# Patient Record
Sex: FEMALE | Race: WHITE | NOT HISPANIC OR LATINO | Employment: UNEMPLOYED | URBAN - METROPOLITAN AREA
[De-identification: names, ages, dates, MRNs, and addresses within clinical notes are randomized per-mention and may not be internally consistent; named-entity substitution may affect disease eponyms.]

---

## 2017-03-06 ENCOUNTER — TELEMEDICINE (OUTPATIENT)
Dept: TELEMEDICINE | Facility: OTHER | Age: 82
End: 2017-03-06
Payer: MEDICARE

## 2017-03-06 DIAGNOSIS — E78.5 DYSLIPIDEMIA: ICD-10-CM

## 2017-03-06 DIAGNOSIS — I10 ESSENTIAL HYPERTENSION: ICD-10-CM

## 2017-03-06 DIAGNOSIS — R47.01 APHASIA: ICD-10-CM

## 2017-03-06 DIAGNOSIS — I48.0 PAROXYSMAL ATRIAL FIBRILLATION: ICD-10-CM

## 2017-03-06 DIAGNOSIS — G81.11 RIGHT SPASTIC HEMIPLEGIA: ICD-10-CM

## 2017-03-06 DIAGNOSIS — I63.412 EMBOLIC STROKE INVOLVING LEFT MIDDLE CEREBRAL ARTERY: ICD-10-CM

## 2017-03-06 PROBLEM — I63.9 STROKE: Status: ACTIVE | Noted: 2017-03-06

## 2017-03-06 PROCEDURE — G0508 CRIT CARE TELEHEA CONSULT 60: HCPCS | Mod: GT,,, | Performed by: PSYCHIATRY & NEUROLOGY

## 2017-03-07 ENCOUNTER — HOSPITAL ENCOUNTER (INPATIENT)
Facility: HOSPITAL | Age: 82
LOS: 15 days | Discharge: NURSING, REHAB OR LONG-TERM CARE | DRG: 064 | End: 2017-03-22
Attending: PSYCHIATRY & NEUROLOGY | Admitting: PSYCHIATRY & NEUROLOGY
Payer: MEDICARE

## 2017-03-07 DIAGNOSIS — I27.20 PULMONARY HYPERTENSION: ICD-10-CM

## 2017-03-07 DIAGNOSIS — I63.412 EMBOLIC STROKE INVOLVING LEFT MIDDLE CEREBRAL ARTERY: ICD-10-CM

## 2017-03-07 DIAGNOSIS — R13.11 ORAL PHASE DYSPHAGIA: ICD-10-CM

## 2017-03-07 DIAGNOSIS — I63.512 ACUTE ISCHEMIC LEFT MCA STROKE: ICD-10-CM

## 2017-03-07 DIAGNOSIS — I10 ESSENTIAL HYPERTENSION: Chronic | ICD-10-CM

## 2017-03-07 DIAGNOSIS — G93.5 BRAIN COMPRESSION: ICD-10-CM

## 2017-03-07 DIAGNOSIS — I63.412 CEREBROVASCULAR ACCIDENT (CVA) DUE TO EMBOLISM OF LEFT MIDDLE CEREBRAL ARTERY: ICD-10-CM

## 2017-03-07 DIAGNOSIS — I48.0 PAROXYSMAL ATRIAL FIBRILLATION: Chronic | ICD-10-CM

## 2017-03-07 DIAGNOSIS — I63.512 ACUTE ISCHEMIC LEFT MIDDLE CEREBRAL ARTERY (MCA) STROKE: ICD-10-CM

## 2017-03-07 DIAGNOSIS — G81.01 RIGHT FLACCID HEMIPLEGIA: Primary | ICD-10-CM

## 2017-03-07 DIAGNOSIS — I63.9 STROKE: ICD-10-CM

## 2017-03-07 DIAGNOSIS — J90 PLEURAL EFFUSION, BILATERAL: ICD-10-CM

## 2017-03-07 DIAGNOSIS — G93.6 CYTOTOXIC CEREBRAL EDEMA: ICD-10-CM

## 2017-03-07 DIAGNOSIS — E78.2 MIXED HYPERLIPIDEMIA: Chronic | ICD-10-CM

## 2017-03-07 PROBLEM — E78.5 HYPERLIPIDEMIA: Chronic | Status: ACTIVE | Noted: 2017-03-07

## 2017-03-07 PROBLEM — E03.9 HYPOTHYROIDISM: Chronic | Status: ACTIVE | Noted: 2017-03-07

## 2017-03-07 LAB
ABO + RH BLD: NORMAL
ALBUMIN SERPL BCP-MCNC: 3 G/DL
ALP SERPL-CCNC: 41 U/L
ALT SERPL W/O P-5'-P-CCNC: 23 U/L
ANION GAP SERPL CALC-SCNC: 8 MMOL/L
AORTIC VALVE STENOSIS: ABNORMAL
APTT BLDCRRT: 21.2 SEC
AST SERPL-CCNC: 39 U/L
BASOPHILS # BLD AUTO: 0.01 K/UL
BASOPHILS NFR BLD: 0.1 %
BILIRUB SERPL-MCNC: 0.7 MG/DL
BLD GP AB SCN CELLS X3 SERPL QL: NORMAL
BUN SERPL-MCNC: 23 MG/DL
CALCIUM SERPL-MCNC: 8.7 MG/DL
CHLORIDE SERPL-SCNC: 103 MMOL/L
CHOLEST/HDLC SERPL: 3 {RATIO}
CO2 SERPL-SCNC: 27 MMOL/L
CREAT SERPL-MCNC: 0.9 MG/DL
DIASTOLIC DYSFUNCTION: YES
DIFFERENTIAL METHOD: ABNORMAL
EOSINOPHIL # BLD AUTO: 0 K/UL
EOSINOPHIL NFR BLD: 0 %
ERYTHROCYTE [DISTWIDTH] IN BLOOD BY AUTOMATED COUNT: 13.5 %
EST. GFR  (AFRICAN AMERICAN): >60 ML/MIN/1.73 M^2
EST. GFR  (NON AFRICAN AMERICAN): >60 ML/MIN/1.73 M^2
ESTIMATED AVG GLUCOSE: 123 MG/DL
ESTIMATED PA SYSTOLIC PRESSURE: 102.24
GLUCOSE SERPL-MCNC: 95 MG/DL
HBA1C MFR BLD HPLC: 5.9 %
HCT VFR BLD AUTO: 32 %
HDL/CHOLESTEROL RATIO: 33.3 %
HDLC SERPL-MCNC: 120 MG/DL
HDLC SERPL-MCNC: 40 MG/DL
HGB BLD-MCNC: 10.9 G/DL
INR PPP: 1.1
LDLC SERPL CALC-MCNC: 68.2 MG/DL
LYMPHOCYTES # BLD AUTO: 1.5 K/UL
LYMPHOCYTES NFR BLD: 15.3 %
MAGNESIUM SERPL-MCNC: 0.8 MG/DL
MAGNESIUM SERPL-MCNC: 2.2 MG/DL
MCH RBC QN AUTO: 32.7 PG
MCHC RBC AUTO-ENTMCNC: 34.1 %
MCV RBC AUTO: 96 FL
MITRAL VALVE MOBILITY: NORMAL
MONOCYTES # BLD AUTO: 1.1 K/UL
MONOCYTES NFR BLD: 11.5 %
NEUTROPHILS # BLD AUTO: 7.2 K/UL
NEUTROPHILS NFR BLD: 72.9 %
NONHDLC SERPL-MCNC: 80 MG/DL
PHOSPHATE SERPL-MCNC: 3.6 MG/DL
PLATELET # BLD AUTO: 189 K/UL
PMV BLD AUTO: 9.8 FL
POCT GLUCOSE: 110 MG/DL (ref 70–110)
POCT GLUCOSE: 96 MG/DL (ref 70–110)
POTASSIUM SERPL-SCNC: 2.9 MMOL/L
POTASSIUM SERPL-SCNC: 3.9 MMOL/L
PROT SERPL-MCNC: 6.6 G/DL
PROTHROMBIN TIME: 12 SEC
RBC # BLD AUTO: 3.33 M/UL
RETIRED EF AND QEF - SEE NOTES: 60 (ref 55–65)
SODIUM SERPL-SCNC: 138 MMOL/L
T4 FREE SERPL-MCNC: 1.23 NG/DL
TRICUSPID VALVE REGURGITATION: ABNORMAL
TRIGL SERPL-MCNC: 59 MG/DL
TSH SERPL DL<=0.005 MIU/L-ACNC: 0.11 UIU/ML
WBC # BLD AUTO: 9.83 K/UL

## 2017-03-07 PROCEDURE — 93306 TTE W/DOPPLER COMPLETE: CPT | Mod: 26,,, | Performed by: INTERNAL MEDICINE

## 2017-03-07 PROCEDURE — 84439 ASSAY OF FREE THYROXINE: CPT

## 2017-03-07 PROCEDURE — 25500020 PHARM REV CODE 255: Performed by: PSYCHIATRY & NEUROLOGY

## 2017-03-07 PROCEDURE — 86900 BLOOD TYPING SEROLOGIC ABO: CPT

## 2017-03-07 PROCEDURE — G8997 SWALLOW GOAL STATUS: HCPCS | Mod: CM

## 2017-03-07 PROCEDURE — 25000003 PHARM REV CODE 250: Performed by: PHYSICIAN ASSISTANT

## 2017-03-07 PROCEDURE — 85610 PROTHROMBIN TIME: CPT

## 2017-03-07 PROCEDURE — 63600175 PHARM REV CODE 636 W HCPCS: Performed by: PHYSICIAN ASSISTANT

## 2017-03-07 PROCEDURE — 97161 PT EVAL LOW COMPLEX 20 MIN: CPT

## 2017-03-07 PROCEDURE — 97802 MEDICAL NUTRITION INDIV IN: CPT

## 2017-03-07 PROCEDURE — 99233 SBSQ HOSP IP/OBS HIGH 50: CPT | Mod: ,,, | Performed by: PSYCHIATRY & NEUROLOGY

## 2017-03-07 PROCEDURE — 99223 1ST HOSP IP/OBS HIGH 75: CPT | Mod: ,,, | Performed by: PHYSICIAN ASSISTANT

## 2017-03-07 PROCEDURE — 80061 LIPID PANEL: CPT

## 2017-03-07 PROCEDURE — 84132 ASSAY OF SERUM POTASSIUM: CPT

## 2017-03-07 PROCEDURE — 83735 ASSAY OF MAGNESIUM: CPT

## 2017-03-07 PROCEDURE — 84443 ASSAY THYROID STIM HORMONE: CPT

## 2017-03-07 PROCEDURE — 85025 COMPLETE CBC W/AUTO DIFF WBC: CPT

## 2017-03-07 PROCEDURE — G8996 SWALLOW CURRENT STATUS: HCPCS | Mod: CN

## 2017-03-07 PROCEDURE — 97162 PT EVAL MOD COMPLEX 30 MIN: CPT

## 2017-03-07 PROCEDURE — 85730 THROMBOPLASTIN TIME PARTIAL: CPT

## 2017-03-07 PROCEDURE — 83735 ASSAY OF MAGNESIUM: CPT | Mod: 91

## 2017-03-07 PROCEDURE — 84100 ASSAY OF PHOSPHORUS: CPT

## 2017-03-07 PROCEDURE — 92610 EVALUATE SWALLOWING FUNCTION: CPT

## 2017-03-07 PROCEDURE — 83036 HEMOGLOBIN GLYCOSYLATED A1C: CPT

## 2017-03-07 PROCEDURE — 97112 NEUROMUSCULAR REEDUCATION: CPT

## 2017-03-07 PROCEDURE — 20000000 HC ICU ROOM

## 2017-03-07 PROCEDURE — 93306 TTE W/DOPPLER COMPLETE: CPT

## 2017-03-07 PROCEDURE — 86850 RBC ANTIBODY SCREEN: CPT

## 2017-03-07 PROCEDURE — 80053 COMPREHEN METABOLIC PANEL: CPT

## 2017-03-07 RX ORDER — CHOLECALCIFEROL (VITAMIN D3) 25 MCG
1000 TABLET ORAL DAILY
Status: DISCONTINUED | OUTPATIENT
Start: 2017-03-07 | End: 2017-03-08

## 2017-03-07 RX ORDER — NAPROXEN SODIUM 220 MG/1
162 TABLET, FILM COATED ORAL DAILY
COMMUNITY

## 2017-03-07 RX ORDER — POTASSIUM CHLORIDE 7.45 MG/ML
80 INJECTION INTRAVENOUS
Status: DISCONTINUED | OUTPATIENT
Start: 2017-03-07 | End: 2017-03-08

## 2017-03-07 RX ORDER — PROPRANOLOL HYDROCHLORIDE 10 MG/1
10 TABLET ORAL 2 TIMES DAILY
COMMUNITY

## 2017-03-07 RX ORDER — HYDROCHLOROTHIAZIDE 12.5 MG/1
25 TABLET ORAL DAILY
Status: DISCONTINUED | OUTPATIENT
Start: 2017-03-07 | End: 2017-03-08

## 2017-03-07 RX ORDER — B-COMPLEX WITH VITAMIN C
1 TABLET ORAL DAILY
COMMUNITY

## 2017-03-07 RX ORDER — CALCIUM CARBONATE 600 MG
600 TABLET ORAL 2 TIMES DAILY WITH MEALS
COMMUNITY

## 2017-03-07 RX ORDER — FLUOROMETHOLONE 1 MG/ML
1 SUSPENSION/ DROPS OPHTHALMIC 2 TIMES DAILY
COMMUNITY

## 2017-03-07 RX ORDER — DILTIAZEM HYDROCHLORIDE 30 MG/1
30 TABLET, FILM COATED ORAL DAILY
Status: ON HOLD | COMMUNITY
End: 2017-03-08 | Stop reason: CLARIF

## 2017-03-07 RX ORDER — ATORVASTATIN CALCIUM 20 MG/1
40 TABLET, FILM COATED ORAL DAILY
Status: DISCONTINUED | OUTPATIENT
Start: 2017-03-08 | End: 2017-03-08

## 2017-03-07 RX ORDER — MAGNESIUM SULFATE HEPTAHYDRATE 40 MG/ML
2 INJECTION, SOLUTION INTRAVENOUS
Status: DISCONTINUED | OUTPATIENT
Start: 2017-03-07 | End: 2017-03-21

## 2017-03-07 RX ORDER — B-COMPLEX WITH VITAMIN C
1 TABLET ORAL DAILY
Status: DISCONTINUED | OUTPATIENT
Start: 2017-03-07 | End: 2017-03-08

## 2017-03-07 RX ORDER — SIMVASTATIN 40 MG/1
40 TABLET, FILM COATED ORAL NIGHTLY
Status: ON HOLD | COMMUNITY
End: 2017-03-22 | Stop reason: HOSPADM

## 2017-03-07 RX ORDER — SIMVASTATIN 10 MG/1
40 TABLET, FILM COATED ORAL NIGHTLY
Status: DISCONTINUED | OUTPATIENT
Start: 2017-03-07 | End: 2017-03-07

## 2017-03-07 RX ORDER — HYDROCHLOROTHIAZIDE 25 MG/1
25 TABLET ORAL DAILY
COMMUNITY

## 2017-03-07 RX ORDER — POTASSIUM CHLORIDE 7.45 MG/ML
40 INJECTION INTRAVENOUS
Status: DISCONTINUED | OUTPATIENT
Start: 2017-03-07 | End: 2017-03-08

## 2017-03-07 RX ORDER — CHOLECALCIFEROL (VITAMIN D3) 25 MCG
185 TABLET ORAL DAILY
COMMUNITY

## 2017-03-07 RX ORDER — SODIUM CHLORIDE 9 MG/ML
INJECTION, SOLUTION INTRAVENOUS CONTINUOUS
Status: DISCONTINUED | OUTPATIENT
Start: 2017-03-07 | End: 2017-03-08

## 2017-03-07 RX ORDER — DILTIAZEM HYDROCHLORIDE 30 MG/1
30 TABLET, FILM COATED ORAL DAILY
Status: DISCONTINUED | OUTPATIENT
Start: 2017-03-07 | End: 2017-03-08

## 2017-03-07 RX ORDER — MAGNESIUM SULFATE HEPTAHYDRATE 40 MG/ML
4 INJECTION, SOLUTION INTRAVENOUS
Status: DISCONTINUED | OUTPATIENT
Start: 2017-03-07 | End: 2017-03-21

## 2017-03-07 RX ORDER — AMOXICILLIN 250 MG
1 CAPSULE ORAL 2 TIMES DAILY
Status: DISCONTINUED | OUTPATIENT
Start: 2017-03-07 | End: 2017-03-08

## 2017-03-07 RX ORDER — POTASSIUM CHLORIDE 7.45 MG/ML
60 INJECTION INTRAVENOUS
Status: DISCONTINUED | OUTPATIENT
Start: 2017-03-07 | End: 2017-03-08

## 2017-03-07 RX ORDER — SODIUM CHLORIDE 0.9 % (FLUSH) 0.9 %
3 SYRINGE (ML) INJECTION EVERY 8 HOURS
Status: DISCONTINUED | OUTPATIENT
Start: 2017-03-07 | End: 2017-03-22 | Stop reason: HOSPADM

## 2017-03-07 RX ORDER — RALOXIFENE HYDROCHLORIDE 60 MG/1
60 TABLET, FILM COATED ORAL DAILY
COMMUNITY

## 2017-03-07 RX ORDER — CALCIUM CARBONATE 500(1250)
500 TABLET ORAL 2 TIMES DAILY WITH MEALS
Status: DISCONTINUED | OUTPATIENT
Start: 2017-03-07 | End: 2017-03-08

## 2017-03-07 RX ADMIN — MAGNESIUM SULFATE IN WATER 4 G: 40 INJECTION, SOLUTION INTRAVENOUS at 09:03

## 2017-03-07 RX ADMIN — Medication 3 ML: at 10:03

## 2017-03-07 RX ADMIN — SODIUM CHLORIDE: 0.9 INJECTION, SOLUTION INTRAVENOUS at 06:03

## 2017-03-07 RX ADMIN — IOHEXOL 100 ML: 350 INJECTION, SOLUTION INTRAVENOUS at 01:03

## 2017-03-07 RX ADMIN — Medication 3 ML: at 06:03

## 2017-03-07 RX ADMIN — Medication 3 ML: at 02:03

## 2017-03-07 RX ADMIN — POTASSIUM CHLORIDE 80 MEQ: 7.46 INJECTION, SOLUTION INTRAVENOUS at 09:03

## 2017-03-07 NOTE — PLAN OF CARE
Problem: Patient Care Overview  Goal: Plan of Care Review  Outcome: Ongoing (interventions implemented as appropriate)  1. If/when medically appropriate, advance diet to cardiac, texture per SLP.   2. If unable to advance diet within 48 hours, recommend initiate enteral nutrition. Isosource 1.5 @ 40 mL/hr would meet pt's estimated needs.               = 1440 calories, 65 grams of protein, 733 mL fluid.                          - Additional fluid per MD; hold for residuals >400 mL.   3. RD to monitor & follow-up.

## 2017-03-07 NOTE — CONSULTS
[facility]  Vascular Neurology  Comprehensive Stroke Center  Consult Note      Consults  Subjective:     History of Present Illness:  Ms. Aguirre is an 82 yo F with a h/o HLD, HTN, hypothyroidism, and newly diagnosed afib (not yet on AC) who presented from an OSH with acute R sided weakness and decreased level of consciousness.  LNN 7pm.  She was evaluated via telestroke, found to have an NIHSS of 25, given tPA, and transferred to [facility] for possible thrombectomy.  A decision not to intervene was made at 2:10am.  No clear triggers.           No past medical history on file.  No past surgical history on file.  No family history on file.  Social History   Substance Use Topics    Smoking status: Not on file    Smokeless tobacco: Not on file    Alcohol use Not on file     Review of patient's allergies indicates:  No Known Allergies  Medications: I have reviewed the current medication administration record.    No prescriptions prior to admission.       Review of Systems   Constitutional: Negative for chills and fatigue.   Respiratory: Negative for cough.    Gastrointestinal: Negative for nausea and vomiting.   Neurological: Positive for speech difficulty and weakness.   Psychiatric/Behavioral: Positive for decreased concentration. The patient is not nervous/anxious.      Objective:     Vital Signs (Most Recent):  Temp: 98.3 °F (36.8 °C) (03/07/17 0200)  Pulse: 70 (03/07/17 0215)  Resp: 20 (03/07/17 0215)  BP: 133/64 (03/07/17 0215)  SpO2: 97 % (03/07/17 0215)    Vital Signs Range (Last 24H):  Temp:  [98.3 °F (36.8 °C)]   Pulse:  [70-84]   Resp:  [16-20]   BP: (133-144)/(64-67)   SpO2:  [97 %]     Physical Exam   Constitutional: She appears well-developed and well-nourished. No distress.   HENT:   Head: Normocephalic and atraumatic.   Cardiovascular: Normal rate.    Pulmonary/Chest: Effort normal.   Skin: Skin is warm and dry. She is not diaphoretic.   Nursing note and vitals  reviewed.      Neurological Exam:   Stuporous - briefly opens eyes to pain  Does not follow commands  Moves LUE, LLE spontaneously.  Withdrawal to RUE and RLE  Mute    NIH Stroke Scale:  Interval: baseline (upon arrival/admit)  Level of Consciousness: 2 - stuporous  LOC Questions: 2 - answers none correctly  LOC Commands: 2 - performs neither correctly  Best Gaze: 0 - normal (patient squeezes eyes tight with attempts to open; refuses to open spontaneously/to command)  Visual: 0 - no visual loss  Facial Palsy: 0 - normal  Motor Left Arm: 0 - no drift  Motor Right Arm: 3 - no effort against gravity  Motor Left Le - drift  Motor Right Leg: 3 - no effort against gravity  Limb Ataxia: 0 - absent  Sensory: 0 - normal  Best Language: 3 - mute  Dysarthria: 0 - normal articulation  Extinction and Inattention: 0 - no neglect  NIH Stroke Scale Total: 16      Laboratory:  CMP: No results for input(s): GLUCOSE, CALCIUM, ALBUMIN, PROT, NA, K, CO2, CL, BUN, CREATININE, ALKPHOS, ALT, AST, BILITOT in the last 24 hours.  BMP: No results for input(s): GLUCOSE, NA, K, CL, CO2, BUN, CREATININE, CALCIUM in the last 168 hours.  CBC: No results for input(s): WBC, RBC, HGB, HCT, PLT, MCV, MCH, MCHC in the last 168 hours.  Lipid Panel: No results for input(s): CHOL, LDLCALC, HDL, TRIG in the last 168 hours.  Coagulation: No results for input(s): INR, APTT in the last 168 hours.    Invalid input(s): PT  Hgb A1C: No results for input(s): HGBA1C in the last 168 hours.  TSH: No results for input(s): TSH in the last 168 hours.    Diagnostic Results:  Brain Imaging:  No LVO seen    Assessment/Plan:     Patient is a 81 y.o. year old female with:    * Acute ischemic left MCA stroke  Likely cardioembolic in etiology, given recent Dx of afib not on anticoagulation.    Antithrombotics for secondary stroke prevention: None: Reason:  Hold all Antithrombotics x 24 hours after IV t-PA administration    Statins for secondary stroke prevention and  hyperlipidemia, if present: Atorvastatin- 40 mg oral daily.  F/u LDL    Aggressive risk factor modification: Hypertension, High Cholesterol, Diet, Exercise and Obesity, Rehab Efforts: Physical Therapy, Occupational Therapy, Speech and Language Pathology and Physical Medicine and Rehabilitation    Diagnostics: Ordered/Pending HgbA1C to assess blood glucose levels, MRI head without contrast to assess brain parenchyma, Trans-thoracic cardiac echo to assess cardiac function/status, TSH to assess thyroid function    VTE Prophylaxis: Hold x24h s/p tPA, BP Parameters: SBP <180    Nursing Orders: Neuro checks- q4h, Antiembolic stockings, Swallowing evaluation by Nursing, Out of bed BID, Avoid Mace catheter, Pneumatic compression device, Stroke Education, Blood glucose target 100-130, Up with assistance and IV Fluids: Per primary team    Hypothyroidism  Stroke RF  Synthroid    Essential hypertension  Stroke RF  SBP goal <180  Management per primary team    Paroxysmal atrial fibrillation  Stroke RF.  Likely etiology of patient's AIS.  Plan to start AC this admission.  Hold for now, given tPA and potential size of stroke.    Hyperlipidemia  Stroke risk factor  Goal LDL <70  F/u LDL  Atorvastatin 40mg daily      Thrombolysis Candidate? Yes. Yes. The risks and benefits of tPA were discussed with the patient and/or family. The patient and/or family verbalized understanding of the risks and benefits and has given verbal consent for tPA. If patient was not competent or no family was available, treatment will be administered as an emergency procedure and in what we believe to be the patient's best interest.    Interventional Revascularization Candidate?  No    Research Candidate? No    iTny Hermosillo MD  [facility]  Department of Vascular Neurology   [facility]

## 2017-03-07 NOTE — PT/OT/SLP EVAL
"Physical Therapy  Evaluation    Zena Aguirre   MRN: [id number]   Admitting Diagnosis: Acute ischemic left MCA stroke    PT Received On: 17  PT Start Time: 0959     PT Stop Time: 4    PT Total Time (min): 45 min       Billable Minutes:  Evaluation 20 and Neuromuscular Re-education 25    Diagnosis: Acute ischemic left MCA stroke    Past Medical History:   Diagnosis Date    Essential hypertension 3/7/2017    Hyperlipidemia 3/7/2017    Hypothyroidism 3/7/2017    Paroxysmal atrial fibrillation 3/7/2017      History reviewed. No pertinent surgical history.    Referring physician: Cele Huang PA-C  Date referred to PT: 3/7/17    General Precautions: Standard, aphasia, aspiration, fall, NPO  Orthopedic Precautions: N/A   Braces: N/A       Patient History:  Pt unable to provide history due to aphasia.  and daughter provide history: Pt lives in [place], [place] in a 1SH with ramps present with her  and 22yr old grandson with cerebral palsy. Grandson requires total care, they have aides to provide care. Pt was independent with all ADLs and functional mobility PTA with no DME.   Equipment used at home:  No Assistive Device.  Equipment owned but not using:  No Assistive Device.  Activity level: active.   Work: retired.   Drive: yes.   Cooking/Cleaning/Managing Home: yes.   Hand dominance: right.   Agueda practiced: Hinduism.  Hobbies: enjoys going to [employer].    Previous Level of Function:  Ambulation Skills: independent  Transfer Skills: independent  ADL Skills: independent    Subjective:  Communicated with Frank RN prior to session.  Pt nonverbal throughout session. Pt opens eyes when name called.  Chief Complaint: R sided weakness and aphasia  Family goals: "Do you think she can overcome this?"    Pain Ratin/10 (no s/s of distress noted)   Pain Rating Post-Intervention: 0/10    Objective:   Patient found with: blood pressure cuff, pulse ox (continuous), SCD, telemetry, sesay catheter, oxygen   "   Cognitive Exam:  Oriented to: aphasic    Follows Commands/attention: Inattentive and Easily distracted  Communication: expressive aphasia and receptive aphasia  Safety awareness/insight to disability: impaired    Physical Exam:  Postural examination/scapula alignment: Rounded shoulder, Head forward and Posterior pelvic tilt    Skin integrity: Visible skin intact  Edema: None noted    Sensation:   Intact  light/touch LUE and LLE responds to light touch.    Upper Extremity Range of Motion: flexor synergy noted of RUE    Lower Extremity Range of Motion:  Right Lower Extremity: PROM was WFL  Left Lower Extremity: AAROM was WFL    Lower Extremity Strength:  Right Lower Extremity: grossly 0/5  Left Lower Extremity: grossly 3/5 per observation, pt not following commands     Functional Mobility:  Bed Mobility:    Rolling to the right: use of bedrail, verbal and tactile cues: Max Assistance   Rolling to the left: total assistance   Supine <> Sit: From right sidelying. Total Assistance   Sit <> Supine: To right sidelying. Total Assistance   Scooting to HOB: Total Assistance via drawsheet x 2 people    Sitting Balance at Edge of Bed:   Assistance Level Required: Maximum Assistance   Time: 10 minutes   Postural deviations noted: Rounded shoulder, Head forward and Posterior pelvic tilt   Encouraged: RUE weight bearing, cervical extension and neutral cervical.    Comments: Pt pushing with LUE, pt without foot support due to height bed.     Transfers:    Did not attempt due to poor posture.      Trunk control test for motor impairment after stroke patient lying on bed:    1) roll to weak side 0  2) roll to strong side 0  3) balance in sitting position on the edge of the bed with feet off the ground for at least 30 seconds 0  4) sit up from lying down 0    Total Score: 0    Scoring: unable to do without assistance: 0                able to do so using nonmuscular help or in an abnormal style, uses arms: 12     able to complete  task normally: 25    Sum the points for all 4 commands:   50 or more: recovery walking   under 40: nonambulatory      Therapeutic Activities and Exercises:  Education:  Patient provided with daily orientation and goals of this PT session. Patient and family agreed to participate in session.Patient and family aware of patient's deficits and therapy progression. They were provided and educated on proper positioning in supine and in sitting with support of affected RUE extremities in order to increase awareness of extremity and to decrease the effects of immobility, specifically edema and pain. Time provided for therapeutic counseling and discussion of health disposition. All questions answered to patient's and family's satisfaction, within scope of PT practice; voiced no other concerns. White board updated in patient's room, RN notified of session.    AM-PAC 6 CLICK MOBILITY  How much help from another person does this patient currently need?   1 = Unable, Total/Dependent Assistance  2 = A lot, Maximum/Moderate Assistance  3 = A little, Minimum/Contact Guard/Supervision  4 = None, Modified [place]/Independent    Turning over in bed (including adjusting bedclothes, sheets and blankets)?: 2  Sitting down on and standing up from a chair with arms (e.g., wheelchair, bedside commode, etc.): 2  Moving from lying on back to sitting on the side of the bed?: 2  Moving to and from a bed to a chair (including a wheelchair)?: 2  Need to walk in hospital room?: 2  Climbing 3-5 steps with a railing?: 1  Total Score: 11     AM-PAC Raw Score CMS G-Code Modifier Level of Impairment Assistance   6 % Total / Unable   7 - 9 CM 80 - 100% Maximal Assist   10 - 14 CL 60 - 80% Moderate Assist   15 - 19 CK 40 - 60% Moderate Assist   20 - 22 CJ 20 - 40% Minimal Assist   23 CI 1-20% SBA / CGA   24 CH 0% Independent/ Mod I     Patient left supine with all lines intact, call button in reach, RN notified and family  present.    Assessment:   Zena Aguirre is a 81 y.o. female with a medical diagnosis of Acute ischemic left MCA stroke. Pt presents with weakness of RUE and RLE, increased flexor tone of RUE and extensor tone of RLE, decreased sensation, decreased balance and functional mobility. Pt also presents with global aphasia, not following any commands. Ms. Aguirre's deficits affect their ability to safely and independently participate in self-care tasks and functional mobility. Due to her physical therapy diagnosis of debility and deconditioning, they continue to benefit from acute PT services to address the following limitations: high fall risk, weakness, instability, the need for supervised instructions of exercise, and the decreased ability to perform functional activities which they were able to complete PTA. Education was provided to patient & family regarding importance of continued participation in therapy, patient's progress and discharge disposition. Pt would benefit from SNF in NH upon discharge to improve quality of life and focus on recovery of impairments.     Rehab identified problem list/impairments: Rehab identified problem list/impairments: weakness, impaired self care skills, impaired functional mobilty, gait instability, decreased safety awareness, impaired cognition, impaired sensation, impaired balance, visual deficits, decreased upper extremity function, decreased lower extremity function, abnormal tone    Rehab potential is fair.    Activity tolerance: Fair    Discharge recommendations: Discharge Facility/Level Of Care Needs: nursing facility, skilled (SNF in NH)     Barriers to discharge: Barriers to Discharge: Decreased caregiver support    Equipment recommendations: Equipment Needed After Discharge: 3-in-1 commode, bath bench, wheelchair     GOALS:   Physical Therapy Goals        Problem: Physical Therapy Goal    Goal Priority Disciplines Outcome Goal Variances Interventions   Physical Therapy Goal      PT/OT, PT Ongoing (interventions implemented as appropriate)     Description:  Goals to be met by: 3/21/17     Patient will increase functional independence with mobility by performing:    Supine to sit with Moderate Assistance.  Sit to supine with Moderate Assistance.   Sit to stand transfer with Moderate Assistance using No Assistive Device.  Bed to chair transfer via Squat Pivot with Moderate Assistance using No Assistive Device.  Static sitting at edge of bed x 15 minutes with BUE support and BLE support with Minimal Assistance.  Able to tolerate exercise for 15-20 reps with assistance as needed.  Patient and family able to teachback stroke & positioning education independently.                  PLAN:    Patient to be seen 6 x/week to address the above listed problems via gait training, therapeutic activities, therapeutic exercises, neuromuscular re-education  Plan of Care expires:    Plan of Care reviewed with: patient, family    Personal factors/comorbidities: HTN, A-fib. Due to the listed comorbidities the patient cannot fully participate in PT POC.  Body systems elements affected: lower extremities, upper extremities, trunk, musculoskeletal system, neuromuscular system, cardiovascular, orientation/level of consciousness  Clinical Presentation: evolving  Functional Outcome Tools: ROM, MMT, AMPAC, TCT  Evaluation Complexity Level: moderate    Josee Cain PT, DPT  3/7/2017  [phone]

## 2017-03-07 NOTE — PT/OT/SLP PROGRESS
Occupational Therapy      Zena Aguirre  MRN: [id number]    Patient not seen today secondary to orders received from PA.  Resent OT orders to MD however not yet signed by MD therefore OT unable to evaluate pt on this date  . Will follow-up 3/8/17.    VERONA Hidalgo  3/7/2017

## 2017-03-07 NOTE — PLAN OF CARE
03/07/17 1146   Discharge Assessment   Assessment Type Discharge Planning Assessment   Confirmed/corrected address and phone number on facesheet? Yes   Assessment information obtained from? Caregiver   Expected Length of Stay (days) 5   Prior to hospitilization cognitive status: Alert/Oriented   Prior to hospitalization functional status: Independent   Current cognitive status: Unable to Assess   Current Functional Status: Completely Dependent   Arrived From admitted as an inpatient;[facility] hospital  ([facility])   Lives With spouse   Able to Return to Prior Arrangements unable to determine at this time (comments)   Is patient able to care for self after discharge? Unable to determine at this time (comments)   How many people do you have in your home that can help with your care after discharge? 1   Who are your caregiver(s) and their phone number(s)? Mike Aguirre ()  [phone] or [phone], Alicia Martinez (daughter)  [phone]   Patient's perception of discharge disposition other (comments)  (anni)   Readmission Within The Last 30 Days previous discharge plan unsuccessful  (Per , patient was admitted at [facility] 10 days ago for new onset Afib)   Patient currently being followed by outpatient case management? No   Patient currently receives home health services? No   Patient currently receives private duty nursing? N/A   Equipment Currently Used at Home none   Do you have any problems affording any of your prescribed medications? No   Is the patient taking medications as prescribed? yes   Do you have any financial concerns preventing you from receiving the healthcare you need? No   Does the patient have transportation to healthcare appointments? Yes   Transportation Available family or friend will provide   On Dialysis? No   Does the patient receive services at the Coumadin Clinic? No   Are there any open cases? No   Discharge Plan A Skilled Nursing Facility    Discharge Plan B Rehab   Patient/Family In Agreement With Plan yes                   PCP:  SKY Drake [place]  Address: [address] [place] [address]  Phone: [phone]    Pharmacy:    [id number] - [place], [place] - [address]  [address]  [place] [address]  Phone: [phone] Fax: [phone]      Emergency Contacts:     Mike Aguirre ()  [phone] or [phone],   Alicia Martinez (daughter)  [phone]         Insurance:  Payor: /   Medicare/SHIRA Gates RN, CCRN-K, [facility]  Neuro-Critical Care   [phone]

## 2017-03-07 NOTE — PROGRESS NOTES
Patient arrived to [facility] from [facility] via flight care and subsequent ambulance transport    Type of Stroke: Ischemic     TPA start time 2050, 3/6/17 and end time 2150, 3/6/17    Patients current symptoms can be noted via VS complex, I& O, Neurological, & CC Pcs flowsheets    NCC notified of patient's arrival to unit. Family brought to bedside and updated on current POC.

## 2017-03-07 NOTE — PT/OT/SLP EVAL
"Speech Language Pathology  Clinical Swallow Evaluation    Zena Aguirre   MRN: [id number]   Admitting Diagnosis: Acute ischemic left MCA stroke    Diet recommendations: Solid Diet Level: NPO  Liquid Diet Level: NPO alternative means of nutrition/hydration/medication recommended at this time.      SLP Treatment Date: 03/07/17  Speech Start Time: 0834     Speech Stop Time: 0853     Speech Total (min): 19 min       TREATMENT BILLABLE MINUTES:  Eval Swallow and Oral Function 19    Diagnosis: Acute ischemic left MCA stroke      Past Medical History:   Diagnosis Date    Essential hypertension 3/7/2017    Hyperlipidemia 3/7/2017    Hypothyroidism 3/7/2017    Paroxysmal atrial fibrillation 3/7/2017     History reviewed. No pertinent surgical history.    Has the patient been evaluated by SLP for swallowing? : Yes  Keep patient NPO?: Yes   General Precautions: Standard, aphasia, aspiration, fall, NPO          Subjective:  Pt was nonverbal/nonvocal.    "Can you take out her dentures?" pt's family requested. SLP was able to remove upper and lower dental plates after evaluation was complete.    Pain Rating:  (no indications of pain)                   Objective:        Oral Musculature Evaluation  Oral Musculature: unable to assess due to poor participation/comprehension  Dentition: upper and lower dentures  Mucosal Quality: sticky  Volitional Cough: unable to follow commands  Volitional Swallow: unable to follow commands  Voice Prior to PO Intake: no vocal output     Bedside Swallow Eval:  Consistencies Assessed: Thin liquids ice chip x 1 (2 attempted), straw sips x 3  Oral Phase: significant oral apraxia evident; pt making no attempted to strip ice chip from spoon; spoon and cup edge trials of thin water deferred due to poor ability to strip ice chips from spoon. Pt did respond to straw placed between lips by eventually sucking.  Coughing/choking and significant anterior loss present on 2nd of 3 straw sip trials; oral " suctioning performed to ensure full clearance of liquid bolus  Pharyngeal Phase: coughing/choking for 1 out of 3 straw sips trials    Additional Treatment:  Education provided to pt and family regarding swallow evaluation, recommendations to remain NPO at this time due to risk of aspiration, and ST POC.  Family verbalized good understanding, but pt was unable to demonstrate understanding due to aphasia and cognitive deficits. Nurse notified of results of swallow evaluation.      Assessment:  Zena Aguirre is a 81 y.o. female with a medical diagnosis of Acute ischemic left MCA stroke and presents with dysphagia. Aphasia, apraxia, and cognitive impairments also evident, but not yet formally assessed.    Do you have any cultural, spiritual, Scientologist conflicts, given your current situation?: aphasia     Discharge recommendations: Discharge Facility/Level Of Care Needs:  (TBD pending PT/OT recs and progress)     Goals:   SLP Goals        Problem: SLP Goal    Goal Priority Disciplines Outcome   SLP Goal     SLP    Description:  Speech Language Pathology Goals  Goals expected to be met by 3/14:  1. Pt will participate in ongoing swallowing assessment to determine if safe to begin PO intake.  2. Pt will participate in speech/language evaluation to determine further goals.                    Plan:   Patient to be seen Therapy Frequency: 5 x/week   Plan of Care expires: 04/05/17  Plan of Care reviewed with: patient, family  SLP Follow-up?: Yes         SLP G-Codes  Functional Assessment Tool Used: noms  Score: 1  Functional Limitations: Swallowing  Swallow Current Status (): CN  Swallow Goal Status (): CM    SAMINA Guzman CCC-SLP  03/07/2017    SAMINA Guzman CCC-SLP  Speech Language Pathologist  [phone]  3/7/2017

## 2017-03-07 NOTE — CONSULTS
Consult completed earlier today.    Bryce Damon MD  Vascular and Interventional Neurology Staff  Director of [facility]  [facility]  [phone]

## 2017-03-07 NOTE — ASSESSMENT & PLAN NOTE
Likely cardioembolic in etiology, given recent Dx of afib not on anticoagulation.    Antithrombotics for secondary stroke prevention: None: Reason:  Hold all Antithrombotics x 24 hours after IV t-PA administration    Statins for secondary stroke prevention and hyperlipidemia, if present: Atorvastatin- 40 mg oral daily.  F/u LDL    Aggressive risk factor modification: Hypertension, High Cholesterol, Diet, Exercise and Obesity, Rehab Efforts: Physical Therapy, Occupational Therapy, Speech and Language Pathology and Physical Medicine and Rehabilitation    Diagnostics: Ordered/Pending HgbA1C to assess blood glucose levels, MRI head without contrast to assess brain parenchyma, Trans-thoracic cardiac echo to assess cardiac function/status, TSH to assess thyroid function    VTE Prophylaxis: Hold x24h s/p tPA, BP Parameters: SBP <180    Nursing Orders: Neuro checks- q4h, Antiembolic stockings, Swallowing evaluation by Nursing, Out of bed BID, Avoid Mace catheter, Pneumatic compression device, Stroke Education, Blood glucose target 100-130, Up with assistance and IV Fluids: Per primary team

## 2017-03-07 NOTE — TELEMEDICINE CONSULT
[facility]  Tele-Consult    Consultation started: 3/6/2017 at 8:23 PM   Consulting Provider:    The patient location is [facility] Emergency Department  Spoke hospital nurse at bedside with patient assisting consultant.     Subjective:   Subjective   History of Present Illness:  Zena Aguirre is a 81 y.o. female who presents with syncope at [facility] followed by inability to speak and use her R side.    Sitting at [facility], at 7 pm, described as LOC syncopal episode, lasted several seconds, then called EMS. When EMS arrived they found R sided arm weakness and facial droop. These symptoms have never happened before. There are no identified triggers or modifying factors. There have been no recurrent events. There are no other associated symptoms.    Wake up Stroke?: no  Last known normal:  1900  Recent bleeding noted: no  Does the patient take any Blood Thinners? no     H&P was obtained from spouse/SO and relative(s).  Past Medical History: HLD, HTN, hypothyroidism, a.fib     Medications: No current outpatient prescriptions on file.    Allergies: Review of patient's allergies indicates:  Allergies not on file    Objective:     Vitals: There were no vitals filed for this visit.     Objective   Physical Exam:  General: well developed, well nourished   HENT: Head:normocephalic and atraumatic   HENT: Ears: right ear normal and left ear normal  Nose: normal nares and no discharge  Eyes:conjunctivae/corneas clear. PERRL.  Neck: normal, no obvious masses and trachea to midline  Lungs: normal respiratory effort  Cardiovascular: Heart: regular rate and rhythm   Cardiovascular: Extremities: no cyanosis, no edema and no clubbing  Abdomen: appears normal and not distended  Skin:  skin color and texture normal, no rash and no bruises  Musculoskeletal: normal range of motion in all extremities       Imaging Notes: No hemmorhage. No mass effect. No early infarct signs. Impression performed at: 2040    [facility] Stroke  Scale:  Interval: baseline (upon arrival/admit)  Level of Consciousness: 0 - alert  LOC Questions: 2 - answers none correctly  LOC Commands: 2 - performs neither correctly  Best Gaze: 1 - partial gaze palsy  Visual: 2 - complete hemianopia  Facial Palsy: 2 - partial  Motor Left Arm: 0 - no drift  Motor Right Arm: 4 - no movement  Motor Left Leg: 3 - no effort against gravity  Motor Right Leg: 3 - no effort against gravity  Limb Ataxia: 0 - absent  Sensory: 1 - mild to moderate loss  Best Language: 3 - mute  Dysarthria: 2 - near to unintelligible  Extinction and Inattention: 0 - no neglect  NIH Stroke Scale Total: 25        Assessment - Diagnosis - Goals:     Plan     Diagnosis/Impression: embolic left MCA stroke , setting of a.fib w/o anticoagulation    TPA Recommendation:   tPA Recommendation   tPA Total Dose:          Bolus Dose       Intravenously over 1 minute (10% of Total Dose)   Infusion Dose       Continuous Infusion over 60 Minutes     Additional Recommendations:   1. Neurological assessment and vital signs (except temperature) every 15 minutes during tPA infusion.  2. Frequency of BP assessments may need to be increased if systolic BP stays >= 180 mm Hg or diastolic BP stays >= 105 mm Hg. Administer antihypertensive meds as ordered  3. Continue to monitor and control blood pressure and monitor for neurological deterioration every 15 minutes for the first hour after the infusion is stopped. Then every 30 minutes for the next 6 hours. Perform hourly monitoring from the 8th post-infusion hour until 24 hours post-infusion.  4. Temperature every 4 hours or as required.  5. Follow hospital protocol for further orders re: post tPA infusion patient management.  6. No antithrombotics or anticoagulants (including but not limited to: heparin, warfarin, aspirin, clopidigrel, or dipyridamole) for 24 hours, then start antithrombotics as ordered by treating physician    Adapted from the American Heart  Association/American Stroke Association (AHA/ASA) and American Association of Neuroscience Nurses (AANN) Guidelines.  Recommendation given at: 2040    Recommendation: NPO until after pass dysphagia screen. Diagnostic studies: CTA Head to assess vasculature , CTA Neck/Arch to assess vasculature, HgbA1C to assess blood glucose levels, Lipid Profile to assess cholesterol levels, MRI head without contrast to assess brain parenchyma, Trans-thoracic cardiac echo to assess cardiac function/status  Consults: Rehab Consult; Occupational Therapy, Rehab Consult; Physical Therapy and Rehab Consult; Speech Therapy  Antithrombotics: Hold all Antithrombotics x 24 hrs after IV t-PA given  Statins: Atorvastatin- 40 mg oral daily    Disposition Recommendation:  transfer to [facility] by  air  stat       Possible Interventional Revascularization Candidate? Yes    Recommended the emergency room physician to have a brief discussion with the patient and/or family if available regarding the risks and benefits of treatment, and to briefly document the occurrence of that discussion in his clinical encounter note.     The attending portion of this evaluation, treatment, and documentation was performed per Jadiel Guzman via audiovisual.      Billing code:  (time dependent stroke, complex case, unstable patient, hemorrhages, any intervention, some mimics)    · This patient has a very critical neurological condition/illness, with very high morbidity and mortality.  · There is a very high probability for acute neurological change leading to clinical and possibly life-threatening deterioration requiring highest level of physician preparedness for urgent intervention.  · There is possibility that this condition will require treatment with high risk medications as quickly as possible.  · There is also a possibility that the patient may benefit from further, more advance complex therapies (e.g. endovascular therapy) that will require  prompt diagnosis and care.  · Care was coordinated with other physicians involved in the patient's care.  · Radiologic studies and laboratory data were reviewed and interpreted, and plan of care was re-assessed based on the results.  · Diagnosis, treatment options and prognosis may have been discussed with the patient and/or family members or caregiver.  Further advanced medical management and further evaluation is warranted for his care.      Consultation ended: 3/6/2017 at 2050    Dariusz Guzman MD  Vascular Neurology

## 2017-03-07 NOTE — PLAN OF CARE
SW met with Pt family at bedside. Family included daughter who is a RN. SW discussed recs and answered questions. SW gave list and they started reviewing the list. Pt daughter reported she would take her father (Pt ) to facilities tomorrow to narrow down some choices. She will contact this SW when ready to chose facilities.    Gilma Mary, DIANE  Neurocritical Care   [facility]  [id number]

## 2017-03-07 NOTE — CONSULTS
PM&R consult received.  Reviewed patient history and current admission.  Full consult to follow.    SKY Wang, FNP-C  Physical Medicine & Rehabilitation   03/07/2017  Spectralink: [id number]

## 2017-03-07 NOTE — CONSULTS
[facility]  Adult Nutrition  Consult Note    SUMMARY     Recommendations    1. If/when medically appropriate, advance diet to cardiac, texture per SLP.   2. If unable to advance diet within 48 hours, recommend initiate enteral nutrition. Isosource 1.5 @ 40 mL/hr would meet pt's estimated needs.    = 1440 calories, 65 grams of protein, 733 mL fluid.    - Additional fluid per MD; hold for residuals >400 mL.   3. RD to monitor & follow-up.    Goals: Some sort of nutrition within 48 hours  Nutrition Goal Status: new  Communication of RD Recs: reviewed with RN    Reason for Assessment    Reason for Assessment: nurse/nurse practitioner consult  Diagnosis: stroke/CVA  Relevent Medical History: HTN, HLD   Interdisciplinary Rounds: did not attend     General Information Comments: Pt non-verbal, no family at bedside. ST recommends pt remain NPO.   Discharge planning: will assess daily.    Nutrition Prescription Ordered    Current Diet Order: NPO    Evaluation of Received Nutrients/Fluid Intake    IV Fluid (mL): 1800    Nutrition Risk Screen     Nutrition Risk Screen: dysphagia or difficulty swallowing    Nutrition/Diet History    Patient Reported Diet/Restrictions/Preferences: other (see comments) (JUAN CARLOS)     Factors Affecting Nutritional Intake: NPO, difficulty/impaired swallowing    Labs/Tests/Procedures/Meds    Pertinent Labs Reviewed: reviewed, pertinent  Pertinent Labs Comments: K 2.9  Pertinent Medications Reviewed: reviewed, pertinent  Pertinent Medications Comments: IVF @ 75 mL/hr, Statin    Physical Findings    Overall Physical Appearance: underweight  Oral/Mouth Cavity: WDL  Skin: intact    Anthropometrics    Height (inches): 62.01 in  Weight Method: Bed Scale  Weight (kg): 61.6 kg     Ideal Body Weight (IBW), Female: 110.05 lb  % Ideal Body Weight, Female (lb): 123.4 lb     BMI (kg/m2): 24.83  BMI Grade: 18.5-24.9 - normal    Estimated/Assessed Needs    Weight Used For Calorie Calculations: 61.6  kg (135 lb 12.9 oz)   Height (cm): 157.5 cm     Energy Need Method: Yermo-St Jeor, other (see comments) (9117-6345 kcal/d)     Weight Used For Protein Calculations: 61.6 kg (135 lb 12.9 oz)  Protein Requirements: 68-80 g/d    Fluid Need Method: RDA Method, other (see comments) (1 mL/kcal or per MD)     Monitor and Evaluation    Food and Nutrient Intake: energy intake, food and beverage intake, enteral nutrition intake  Food and Nutrient Adminstration: diet order, enteral and parenteral nutrition administration     Physical Activity and Function: nutrition-related ADLs and IADLs  Anthropometric Measurements: weight, weight change, body mass index  Biochemical Data, Medical Tests and Procedures: electrolyte and renal panel, gastrointestinal profile, glucose/endocrine profile, inflammatory profile, lipid profile  Nutrition-Focused Physical Findings: overall appearance    Nutrition Risk    Level of Risk: high    Nutrition Follow-Up    RD Follow-up?: Yes    Assessment and Plan    Inadequate energy intake r/t inability to consume sufficient energy AEB NPO with no alternate means of nutrition.  Status: New

## 2017-03-07 NOTE — SUBJECTIVE & OBJECTIVE
No past medical history on file.  No past surgical history on file.  No family history on file.  Social History   Substance Use Topics    Smoking status: Not on file    Smokeless tobacco: Not on file    Alcohol use Not on file     Review of patient's allergies indicates:  No Known Allergies  Medications: I have reviewed the current medication administration record.    No prescriptions prior to admission.       Review of Systems   Constitutional: Negative for chills and fatigue.   Respiratory: Negative for cough.    Gastrointestinal: Negative for nausea and vomiting.   Neurological: Positive for speech difficulty and weakness.   Psychiatric/Behavioral: Positive for decreased concentration. The patient is not nervous/anxious.      Objective:     Vital Signs (Most Recent):  Temp: 98.3 °F (36.8 °C) (03/07/17 0200)  Pulse: 70 (03/07/17 0215)  Resp: 20 (03/07/17 0215)  BP: 133/64 (03/07/17 0215)  SpO2: 97 % (03/07/17 0215)    Vital Signs Range (Last 24H):  Temp:  [98.3 °F (36.8 °C)]   Pulse:  [70-84]   Resp:  [16-20]   BP: (133-144)/(64-67)   SpO2:  [97 %]     Physical Exam   Constitutional: She appears well-developed and well-nourished. No distress.   HENT:   Head: Normocephalic and atraumatic.   Cardiovascular: Normal rate.    Pulmonary/Chest: Effort normal.   Skin: Skin is warm and dry. She is not diaphoretic.   Nursing note and vitals reviewed.      Neurological Exam:   Stuporous - briefly opens eyes to pain  Does not follow commands  Moves LUE, LLE spontaneously.  Withdrawal to RUE and RLE  Mute    NIH Stroke Scale:  Interval: baseline (upon arrival/admit)  Level of Consciousness: 2 - stuporous  LOC Questions: 2 - answers none correctly  LOC Commands: 2 - performs neither correctly  Best Gaze: 0 - normal (patient squeezes eyes tight with attempts to open; refuses to open spontaneously/to command)  Visual: 0 - no visual loss  Facial Palsy: 0 - normal  Motor Left Arm: 0 - no drift  Motor Right Arm: 3 - no  effort against gravity  Motor Left Le - drift  Motor Right Leg: 3 - no effort against gravity  Limb Ataxia: 0 - absent  Sensory: 0 - normal  Best Language: 3 - mute  Dysarthria: 0 - normal articulation  Extinction and Inattention: 0 - no neglect  NIH Stroke Scale Total: 16      Laboratory:  CMP: No results for input(s): GLUCOSE, CALCIUM, ALBUMIN, PROT, NA, K, CO2, CL, BUN, CREATININE, ALKPHOS, ALT, AST, BILITOT in the last 24 hours.  BMP: No results for input(s): GLUCOSE, NA, K, CL, CO2, BUN, CREATININE, CALCIUM in the last 168 hours.  CBC: No results for input(s): WBC, RBC, HGB, HCT, PLT, MCV, MCH, MCHC in the last 168 hours.  Lipid Panel: No results for input(s): CHOL, LDLCALC, HDL, TRIG in the last 168 hours.  Coagulation: No results for input(s): INR, APTT in the last 168 hours.    Invalid input(s): PT  Hgb A1C: No results for input(s): HGBA1C in the last 168 hours.  TSH: No results for input(s): TSH in the last 168 hours.    Diagnostic Results:  Brain Imaging:  No LVO seen

## 2017-03-07 NOTE — PLAN OF CARE
Problem: Physical Therapy Goal  Goal: Physical Therapy Goal  Goals to be met by: 3/21/17     Patient will increase functional independence with mobility by performing:    Supine to sit with Moderate Assistance.  Sit to supine with Moderate Assistance.   Sit to stand transfer with Moderate Assistance using No Assistive Device.  Bed to chair transfer via Squat Pivot with Moderate Assistance using No Assistive Device.  Static sitting at edge of bed x 15 minutes with BUE support and BLE support with Minimal Assistance.  Able to tolerate exercise for 15-20 reps with assistance as needed.  Patient and family able to teachback stroke & positioning education independently.  Outcome: Ongoing (interventions implemented as appropriate)  Goals initiated at [facility]. Pt would benefit from SNF in [place] upon discharge.  Josee Cain PT, DPT  3/7/2017  [phone]

## 2017-03-07 NOTE — H&P
History & Physical  Neurocritical Care    Admit Date: 3/7/2017  LOS: 0    Code Status: No Order     CC: Acute ischemic left MCA stroke    SUBJECTIVE:     History of Present Illness:  81 year old female with history of HTN, HLD, hypothyroidism and recently diagnosed afib (diagnosed 10 days ago, started on cardizem but not yet any anticoagulation) who has acute onset decreased responsiveness and R hemiparesis last night. Last seen normal at 7pm and given tpa afte evaluated by telestroke and found to have NIHSS of 25. CTA MP revealed L M1 occlusion but with hypodensities already visualized on plain CT. No mechanical intervention planned based on CT changes. Will admit to [facility] for post tpa monitoring         Past Medical History:   Diagnosis Date    Essential hypertension 3/7/2017    Hyperlipidemia 3/7/2017    Hypothyroidism 3/7/2017    Paroxysmal atrial fibrillation 3/7/2017     History reviewed. No pertinent surgical history.  No current facility-administered medications on file prior to encounter.      No current outpatient prescriptions on file prior to encounter.     Review of patient's allergies indicates:  No Known Allergies  No family history on file.  Social History   Substance Use Topics    Smoking status: Never Smoker    Smokeless tobacco: Never Used    Alcohol use No      Review of Symptoms:  Constitutional: Denies fevers, weight loss, chills, or weakness.  Eyes: Denies changes in vision.  ENT: Denies dysphagia, nasal discharge, ear pain or discharge.  Cardiovascular: Denies chest pain, palpitations, orthopnea, or claudication.  Respiratory: Denies shortness of breath, cough, hemoptysis, or wheezing.  GI: Denies nausea/vomitting, hematochezia, melena, abd pain, or changes in appetite.  : Denies dysuria, incontinence, or hematuria.  Musculoskeletal: Denies joint pain or myalgias.  Skin/breast: Denies rashes, lumps, lesions, or discharge.  Neurologic: Denies headache, dizziness, vertigo, or  paresthesias.  Psychiatric: Denies changes in mood or hallucinations.  Endocrine: Denies polyuria, polydipsia, heat/cold intolerance.  Hematologic/Lymph: Denies lymphadenopathy, easy bruising or easy bleeding.  Allergic/Immunologic: Denies rash, rhinitis.     OBJECTIVE:   Vital Signs (Most Recent):   Temp: 97.4 °F (36.3 °C) (03/07/17 0315)  Pulse: 69 (03/07/17 0430)  Resp: 19 (03/07/17 0430)  BP: 126/62 (03/07/17 0430)  SpO2: 97 % (03/07/17 0430)    Vital Signs (24h Range):   Temp:  [97.4 °F (36.3 °C)-98.3 °F (36.8 °C)] 97.4 °F (36.3 °C)  Pulse:  [63-84] 69  Resp:  [16-23] 19  SpO2:  [97 %-99 %] 97 %  BP: (126-149)/(60-74) 126/62    ICP/CPP (Last 24h):        I & O (Last 24h):    Intake/Output Summary (Last 24 hours) at 03/07/17 0441  Last data filed at 03/07/17 0300   Gross per 24 hour   Intake                0 ml   Output              770 ml   Net             -770 ml     Physical Exam:  GA: Alert, comfortable, no acute distress.   HEENT: No scleral icterus or JVD.   Pulmonary: Clear to auscultation A/P/L. No wheezing, crackles, or rhonchi.  Cardiac: RRR S1 & S2 w/o rubs/murmurs/gallops.   Abdominal: Bowel sounds present x 4. No appreciable hepatosplenomegaly.  Skin: No jaundice, rashes, or visible lesions.  Neuro:  --GCS: E3 V1 M5  --Mental Status:  Awake,  Intermittently opens eyes spontaneously and other times to voice. Global aphasia . Does not follow commands  --PERRL, L gaze pref  --Corneal reflex, gag, cough intact.  --withdraws to pain on RUE, all other extremities moves spontaneously  --+ babinski on R         Lines/Drains/Airway:              Urethral Catheter 03/06/17 (Active)   Site Assessment Clean;Dry 3/7/2017  3:05 AM   Collection Container Urimeter 3/7/2017  3:05 AM   Securement Method secured to top of thigh w/ adhesive device 3/7/2017  3:05 AM   Catheter Care Performed yes 3/7/2017  3:05 AM   Reason for Continuing Urinary Catheterization Critically ill in ICU requiring intensive monitoring  "3/7/2017  3:05 AM   CAUTI Prevention Bundle StatLock in place w 1" slack;Intact seal between catheter & drainage tubing;Drainage bag off the floor;Green sheeting clip in use;No dependent loops or kinks;Drainage bag not overfilled (<2/3 full);Drainage bag below bladder 3/7/2017  1:50 AM   Output (mL) 160 mL 3/7/2017  3:00 AM     Nutrition/Tube Feeds:   Current Diet Order   No orders of the defined types were placed in this encounter.       Labs:  ABG: No results for input(s): PH, PO2, PCO2, HCO3, POCSATURATED, BE in the last 24 hours.  BMP:No results for input(s): NA, K, CL, CO2, BUN, CREATININE, GLU, MG, PHOS in the last 24 hours.  LFT: No results found for: AST, ALT, GGT, ALKPHOS, BILITOT, ALBUMIN, PROT  CBC: No results found for: WBC, HGB, HCT, MCV, PLT  Microbiology x 7d:   Microbiology Results (last 7 days)     ** No results found for the last 168 hours. **        Imaging:  CTA-MP  Findings most consistent with extensive acute infarct in the left MCA territory.    High-grade stenosis of proximal left M1 segment with a two-phase delay with decreased vascular prominence; grade 2 collaterals.      Sequela of chronic microvascular ischemic changes.    High grade narrowing involving the intracranial right vertebral artery.      ASSESSMENT/PLAN:     Active Hospital Problems    Diagnosis  POA    *Acute ischemic left MCA stroke [I63.512]  Unknown    Hypothyroidism [E03.9]  Unknown     Chronic    Essential hypertension [I10]  Unknown     Chronic    Paroxysmal atrial fibrillation [I48.0]  Unknown     Chronic    Hyperlipidemia [E78.5]  Unknown     Chronic      Resolved Hospital Problems    Diagnosis Date Resolved POA   No resolved problems to display.     Neuro:   Acute L MCA infarct,  M1 occluded, sp tpa  --not candidate for thrombectomy-changes present on CT  --hourly neuro checks,MRI at 24 hours sp tpa ( completed at 2150 on 3/6)  -- resume simvastatin  --[facility] neuro team on board  --PT/OT, SLP      Pulmonary:   CXR " pending  RA , maintaining adequate O2 saturation     Cardiac:   A fib, paroxysmal  --recently diagnosed - 10 days ago by OSH  --no AC, on diltiazem 30 daily - currently rate controlled   --echo pending     Renal:    --NS at 75  --monitor Na    ID:    afebrile, no leukocytosis    Hem/Onc:   --daily cbc    Endocrine:    A1c, lipid panel pending  continue home statin     Fluids/Electrolytes/Nutrition/GI:   Swallow study pending, will likely need  NG due to aphasia         Cele Huang PA-C  Neurocritical Care  [facility]  Pager/Spectralink #[phone]

## 2017-03-08 PROBLEM — G81.01 RIGHT FLACCID HEMIPLEGIA: Status: ACTIVE | Noted: 2017-03-08

## 2017-03-08 PROBLEM — J90 PLEURAL EFFUSION, BILATERAL: Status: ACTIVE | Noted: 2017-03-08

## 2017-03-08 PROBLEM — I27.20 PULMONARY HYPERTENSION: Status: ACTIVE | Noted: 2017-03-08

## 2017-03-08 PROBLEM — G93.6 CYTOTOXIC CEREBRAL EDEMA: Status: ACTIVE | Noted: 2017-03-08

## 2017-03-08 LAB
ALBUMIN SERPL BCP-MCNC: 2.9 G/DL
ALLENS TEST: ABNORMAL
ALLENS TEST: ABNORMAL
ALP SERPL-CCNC: 42 U/L
ALT SERPL W/O P-5'-P-CCNC: 23 U/L
ANION GAP SERPL CALC-SCNC: 8 MMOL/L
AST SERPL-CCNC: 49 U/L
BASOPHILS # BLD AUTO: 0.01 K/UL
BASOPHILS NFR BLD: 0.1 %
BILIRUB SERPL-MCNC: 1 MG/DL
BUN SERPL-MCNC: 15 MG/DL
CALCIUM SERPL-MCNC: 8.1 MG/DL
CHLORIDE SERPL-SCNC: 105 MMOL/L
CO2 SERPL-SCNC: 22 MMOL/L
CREAT SERPL-MCNC: 0.8 MG/DL
DELSYS: ABNORMAL
DELSYS: ABNORMAL
DIFFERENTIAL METHOD: ABNORMAL
EOSINOPHIL # BLD AUTO: 0 K/UL
EOSINOPHIL NFR BLD: 0.2 %
ERYTHROCYTE [DISTWIDTH] IN BLOOD BY AUTOMATED COUNT: 13.8 %
EST. GFR  (AFRICAN AMERICAN): >60 ML/MIN/1.73 M^2
EST. GFR  (NON AFRICAN AMERICAN): >60 ML/MIN/1.73 M^2
GLUCOSE SERPL-MCNC: 94 MG/DL
HCO3 UR-SCNC: 18.6 MMOL/L (ref 24–28)
HCO3 UR-SCNC: 18.7 MMOL/L (ref 24–28)
HCT VFR BLD AUTO: 32.8 %
HGB BLD-MCNC: 10.8 G/DL
LYMPHOCYTES # BLD AUTO: 1.5 K/UL
LYMPHOCYTES NFR BLD: 13.3 %
MAGNESIUM SERPL-MCNC: 1.7 MG/DL
MAGNESIUM SERPL-MCNC: 1.8 MG/DL
MCH RBC QN AUTO: 32.1 PG
MCHC RBC AUTO-ENTMCNC: 32.9 %
MCV RBC AUTO: 98 FL
MODE: ABNORMAL
MODE: ABNORMAL
MONOCYTES # BLD AUTO: 1.7 K/UL
MONOCYTES NFR BLD: 15.3 %
NEUTROPHILS # BLD AUTO: 8 K/UL
NEUTROPHILS NFR BLD: 70.9 %
PCO2 BLDA: 25.9 MMHG (ref 35–45)
PCO2 BLDA: 27.9 MMHG (ref 35–45)
PH SMN: 7.43 [PH] (ref 7.35–7.45)
PH SMN: 7.46 [PH] (ref 7.35–7.45)
PHOSPHATE SERPL-MCNC: 1.9 MG/DL
PHOSPHATE SERPL-MCNC: 2.4 MG/DL
PLATELET # BLD AUTO: 187 K/UL
PMV BLD AUTO: 9.9 FL
PO2 BLDA: 69 MMHG (ref 80–100)
PO2 BLDA: 80 MMHG (ref 80–100)
POC BE: -5 MMOL/L
POC BE: -6 MMOL/L
POC SATURATED O2: 95 % (ref 95–100)
POC SATURATED O2: 96 % (ref 95–100)
POC TCO2: 19 MMOL/L (ref 23–27)
POC TCO2: 19 MMOL/L (ref 23–27)
POCT GLUCOSE: 106 MG/DL (ref 70–110)
POCT GLUCOSE: 117 MG/DL (ref 70–110)
POCT GLUCOSE: 132 MG/DL (ref 70–110)
POCT GLUCOSE: 87 MG/DL (ref 70–110)
POTASSIUM SERPL-SCNC: 3.3 MMOL/L
POTASSIUM SERPL-SCNC: 3.6 MMOL/L
PROT SERPL-MCNC: 6.5 G/DL
RBC # BLD AUTO: 3.36 M/UL
SAMPLE: ABNORMAL
SAMPLE: ABNORMAL
SITE: ABNORMAL
SITE: ABNORMAL
SODIUM SERPL-SCNC: 128 MMOL/L
SODIUM SERPL-SCNC: 135 MMOL/L
SODIUM SERPL-SCNC: 135 MMOL/L
SP02: 95
WBC # BLD AUTO: 11.3 K/UL

## 2017-03-08 PROCEDURE — 25000003 PHARM REV CODE 250: Performed by: PHYSICIAN ASSISTANT

## 2017-03-08 PROCEDURE — C1751 CATH, INF, PER/CENT/MIDLINE: HCPCS

## 2017-03-08 PROCEDURE — G8988 SELF CARE GOAL STATUS: HCPCS | Mod: CM

## 2017-03-08 PROCEDURE — 84100 ASSAY OF PHOSPHORUS: CPT

## 2017-03-08 PROCEDURE — 76937 US GUIDE VASCULAR ACCESS: CPT

## 2017-03-08 PROCEDURE — 25000003 PHARM REV CODE 250: Performed by: PSYCHIATRY & NEUROLOGY

## 2017-03-08 PROCEDURE — 85025 COMPLETE CBC W/AUTO DIFF WBC: CPT

## 2017-03-08 PROCEDURE — 36620 INSERTION CATHETER ARTERY: CPT | Mod: ,,, | Performed by: PSYCHIATRY & NEUROLOGY

## 2017-03-08 PROCEDURE — 37799 UNLISTED PX VASCULAR SURGERY: CPT

## 2017-03-08 PROCEDURE — 82803 BLOOD GASES ANY COMBINATION: CPT

## 2017-03-08 PROCEDURE — 63600175 PHARM REV CODE 636 W HCPCS: Performed by: PHYSICIAN ASSISTANT

## 2017-03-08 PROCEDURE — 83735 ASSAY OF MAGNESIUM: CPT | Mod: 91

## 2017-03-08 PROCEDURE — 97530 THERAPEUTIC ACTIVITIES: CPT

## 2017-03-08 PROCEDURE — 99291 CRITICAL CARE FIRST HOUR: CPT | Mod: 25,,, | Performed by: PHYSICIAN ASSISTANT

## 2017-03-08 PROCEDURE — G8987 SELF CARE CURRENT STATUS: HCPCS | Mod: CN

## 2017-03-08 PROCEDURE — 02HV33Z INSERTION OF INFUSION DEVICE INTO SUPERIOR VENA CAVA, PERCUTANEOUS APPROACH: ICD-10-PCS | Performed by: PSYCHIATRY & NEUROLOGY

## 2017-03-08 PROCEDURE — 99222 1ST HOSP IP/OBS MODERATE 55: CPT | Mod: ,,, | Performed by: PHYSICIAN ASSISTANT

## 2017-03-08 PROCEDURE — 99900035 HC TECH TIME PER 15 MIN (STAT)

## 2017-03-08 PROCEDURE — 97165 OT EVAL LOW COMPLEX 30 MIN: CPT

## 2017-03-08 PROCEDURE — 36620 INSERTION CATHETER ARTERY: CPT

## 2017-03-08 PROCEDURE — 84132 ASSAY OF SERUM POTASSIUM: CPT

## 2017-03-08 PROCEDURE — 80053 COMPREHEN METABOLIC PANEL: CPT

## 2017-03-08 PROCEDURE — 92523 SPEECH SOUND LANG COMPREHEN: CPT

## 2017-03-08 PROCEDURE — 84295 ASSAY OF SERUM SODIUM: CPT | Mod: 91

## 2017-03-08 PROCEDURE — 92526 ORAL FUNCTION THERAPY: CPT

## 2017-03-08 PROCEDURE — 36569 INSJ PICC 5 YR+ W/O IMAGING: CPT

## 2017-03-08 PROCEDURE — 99233 SBSQ HOSP IP/OBS HIGH 50: CPT | Mod: ,,, | Performed by: PSYCHIATRY & NEUROLOGY

## 2017-03-08 PROCEDURE — 84100 ASSAY OF PHOSPHORUS: CPT | Mod: 91

## 2017-03-08 PROCEDURE — 83735 ASSAY OF MAGNESIUM: CPT

## 2017-03-08 PROCEDURE — 20000000 HC ICU ROOM

## 2017-03-08 RX ORDER — DILTIAZEM HYDROCHLORIDE 30 MG/1
30 TABLET, FILM COATED ORAL DAILY
Status: DISCONTINUED | OUTPATIENT
Start: 2017-03-09 | End: 2017-03-08

## 2017-03-08 RX ORDER — SODIUM CHLORIDE 0.9 % (FLUSH) 0.9 %
10 SYRINGE (ML) INJECTION EVERY 6 HOURS
Status: DISCONTINUED | OUTPATIENT
Start: 2017-03-08 | End: 2017-03-20 | Stop reason: ALTCHOICE

## 2017-03-08 RX ORDER — DILTIAZEM HYDROCHLORIDE 180 MG/1
180 CAPSULE, COATED, EXTENDED RELEASE ORAL DAILY
COMMUNITY

## 2017-03-08 RX ORDER — CALCIUM CARBONATE 500(1250)
500 TABLET ORAL 2 TIMES DAILY WITH MEALS
Status: DISCONTINUED | OUTPATIENT
Start: 2017-03-08 | End: 2017-03-21

## 2017-03-08 RX ORDER — B-COMPLEX WITH VITAMIN C
1 TABLET ORAL DAILY
Status: DISCONTINUED | OUTPATIENT
Start: 2017-03-09 | End: 2017-03-21

## 2017-03-08 RX ORDER — LEVOTHYROXINE SODIUM 25 UG/1
25 TABLET ORAL
Status: DISCONTINUED | OUTPATIENT
Start: 2017-03-08 | End: 2017-03-21

## 2017-03-08 RX ORDER — HYDROCHLOROTHIAZIDE 12.5 MG/1
25 TABLET ORAL DAILY
Status: DISCONTINUED | OUTPATIENT
Start: 2017-03-09 | End: 2017-03-11

## 2017-03-08 RX ORDER — 3% SODIUM CHLORIDE 3 G/100ML
250 INJECTION, SOLUTION INTRAVENOUS ONCE
Status: DISCONTINUED | OUTPATIENT
Start: 2017-03-08 | End: 2017-03-22

## 2017-03-08 RX ORDER — AMOXICILLIN 250 MG
1 CAPSULE ORAL 2 TIMES DAILY
Status: DISCONTINUED | OUTPATIENT
Start: 2017-03-08 | End: 2017-03-21

## 2017-03-08 RX ORDER — LEVOTHYROXINE SODIUM 25 UG/1
25 TABLET ORAL DAILY
COMMUNITY

## 2017-03-08 RX ORDER — 3% SODIUM CHLORIDE 3 G/100ML
50 INJECTION, SOLUTION INTRAVENOUS CONTINUOUS
Status: DISCONTINUED | OUTPATIENT
Start: 2017-03-08 | End: 2017-03-09

## 2017-03-08 RX ORDER — DILTIAZEM HYDROCHLORIDE 30 MG/1
30 TABLET, FILM COATED ORAL EVERY 8 HOURS
Status: DISCONTINUED | OUTPATIENT
Start: 2017-03-08 | End: 2017-03-09

## 2017-03-08 RX ORDER — POTASSIUM CHLORIDE 14.9 MG/ML
60 INJECTION INTRAVENOUS
Status: DISCONTINUED | OUTPATIENT
Start: 2017-03-08 | End: 2017-03-20

## 2017-03-08 RX ORDER — POTASSIUM CHLORIDE 29.8 MG/ML
80 INJECTION INTRAVENOUS
Status: DISCONTINUED | OUTPATIENT
Start: 2017-03-08 | End: 2017-03-20

## 2017-03-08 RX ORDER — INDOMETHACIN 25 MG/1
50 CAPSULE ORAL ONCE
Status: COMPLETED | OUTPATIENT
Start: 2017-03-08 | End: 2017-03-08

## 2017-03-08 RX ORDER — CHOLECALCIFEROL (VITAMIN D3) 25 MCG
1000 TABLET ORAL DAILY
Status: DISCONTINUED | OUTPATIENT
Start: 2017-03-09 | End: 2017-03-21

## 2017-03-08 RX ORDER — SODIUM CHLORIDE 0.9 % (FLUSH) 0.9 %
10 SYRINGE (ML) INJECTION
Status: DISCONTINUED | OUTPATIENT
Start: 2017-03-08 | End: 2017-03-20 | Stop reason: ALTCHOICE

## 2017-03-08 RX ORDER — ACETAMINOPHEN 325 MG/1
650 TABLET ORAL EVERY 6 HOURS PRN
Status: DISCONTINUED | OUTPATIENT
Start: 2017-03-08 | End: 2017-03-21

## 2017-03-08 RX ORDER — MANNITOL 250 MG/ML
60 INJECTION, SOLUTION INTRAVENOUS ONCE
Status: COMPLETED | OUTPATIENT
Start: 2017-03-08 | End: 2017-03-08

## 2017-03-08 RX ORDER — LABETALOL HYDROCHLORIDE 5 MG/ML
10 INJECTION, SOLUTION INTRAVENOUS
Status: DISCONTINUED | OUTPATIENT
Start: 2017-03-08 | End: 2017-03-15

## 2017-03-08 RX ORDER — ATORVASTATIN CALCIUM 20 MG/1
40 TABLET, FILM COATED ORAL DAILY
Status: DISCONTINUED | OUTPATIENT
Start: 2017-03-09 | End: 2017-03-21

## 2017-03-08 RX ORDER — POTASSIUM CHLORIDE 29.8 MG/ML
40 INJECTION INTRAVENOUS
Status: DISCONTINUED | OUTPATIENT
Start: 2017-03-08 | End: 2017-03-20

## 2017-03-08 RX ADMIN — Medication 3 ML: at 05:03

## 2017-03-08 RX ADMIN — SODIUM PHOSPHATE, MONOBASIC, MONOHYDRATE AND SODIUM PHOSPHATE, DIBASIC, ANHYDROUS 15 MMOL: 276; 142 INJECTION, SOLUTION INTRAVENOUS at 07:03

## 2017-03-08 RX ADMIN — POTASSIUM CHLORIDE 40 MEQ: 400 INJECTION, SOLUTION INTRAVENOUS at 09:03

## 2017-03-08 RX ADMIN — ACETAMINOPHEN 650 MG: 325 TABLET ORAL at 11:03

## 2017-03-08 RX ADMIN — LABETALOL HYDROCHLORIDE 10 MG: 5 INJECTION, SOLUTION INTRAVENOUS at 09:03

## 2017-03-08 RX ADMIN — LABETALOL HYDROCHLORIDE 10 MG: 5 INJECTION, SOLUTION INTRAVENOUS at 04:03

## 2017-03-08 RX ADMIN — MANNITOL 60 G: 12.5 INJECTION, SOLUTION INTRAVENOUS at 11:03

## 2017-03-08 RX ADMIN — ATORVASTATIN CALCIUM 40 MG: 20 TABLET, FILM COATED ORAL at 09:03

## 2017-03-08 RX ADMIN — DILTIAZEM HYDROCHLORIDE 30 MG: 30 TABLET, FILM COATED ORAL at 09:03

## 2017-03-08 RX ADMIN — Medication 3 ML: at 09:03

## 2017-03-08 RX ADMIN — SODIUM CHLORIDE 200 ML: 3 INJECTION, SOLUTION INTRAVENOUS at 03:03

## 2017-03-08 RX ADMIN — LEVOTHYROXINE SODIUM 25 MCG: 25 TABLET ORAL at 11:03

## 2017-03-08 RX ADMIN — ACETAMINOPHEN 650 MG: 325 TABLET ORAL at 06:03

## 2017-03-08 RX ADMIN — HYDROCHLOROTHIAZIDE 25 MG: 12.5 TABLET ORAL at 09:03

## 2017-03-08 RX ADMIN — STANDARDIZED SENNA CONCENTRATE AND DOCUSATE SODIUM 1 TABLET: 8.6; 5 TABLET, FILM COATED ORAL at 09:03

## 2017-03-08 RX ADMIN — SODIUM CHLORIDE 50 ML/HR: 3 INJECTION, SOLUTION INTRAVENOUS at 11:03

## 2017-03-08 RX ADMIN — MAGNESIUM SULFATE IN WATER 2 G: 40 INJECTION, SOLUTION INTRAVENOUS at 04:03

## 2017-03-08 RX ADMIN — VITAMIN D, TAB 1000IU (100/BT) 1000 UNITS: 25 TAB at 09:03

## 2017-03-08 RX ADMIN — Medication 3 ML: at 01:03

## 2017-03-08 RX ADMIN — SODIUM BICARBONATE 50 MEQ: 84 INJECTION, SOLUTION INTRAVENOUS at 08:03

## 2017-03-08 RX ADMIN — POTASSIUM CHLORIDE 60 MEQ: 7.46 INJECTION, SOLUTION INTRAVENOUS at 05:03

## 2017-03-08 RX ADMIN — Medication 10 ML: at 05:03

## 2017-03-08 RX ADMIN — VITAMIN C 1 TABLET: TAB at 09:03

## 2017-03-08 RX ADMIN — DILTIAZEM HYDROCHLORIDE 30 MG: 30 TABLET, FILM COATED ORAL at 01:03

## 2017-03-08 RX ADMIN — SODIUM PHOSPHATE, MONOBASIC, MONOHYDRATE 20.01 MMOL: 276; 142 INJECTION, SOLUTION INTRAVENOUS at 11:03

## 2017-03-08 RX ADMIN — SODIUM CHLORIDE 75 ML/HR: 3 INJECTION, SOLUTION INTRAVENOUS at 07:03

## 2017-03-08 RX ADMIN — Medication 10 ML: at 11:03

## 2017-03-08 NOTE — PLAN OF CARE
Problem: Physical Therapy Goal  Goal: Physical Therapy Goal  Goals to be met by: 3/21/17     Patient will increase functional independence with mobility by performing:    Supine to sit with Moderate Assistance.  Sit to supine with Moderate Assistance.   Sit to stand transfer with Moderate Assistance using No Assistive Device.  Bed to chair transfer via Squat Pivot with Moderate Assistance using No Assistive Device.  Static sitting at edge of bed x 15 minutes with BUE support and BLE support with Minimal Assistance.  Able to tolerate exercise for 15-20 reps with assistance as needed.  Patient and family able to teachback stroke & positioning education independently.     Outcome: Ongoing (interventions implemented as appropriate)  Pt would benefit from SNF in [place] upon discharge. Pt progressing well towards goals.  Josee Cain PT, DPT  3/8/2017  [phone]

## 2017-03-08 NOTE — PROCEDURES
"Zena Aguirre is a 81 y.o. female patient.    Temp: 99.4 °F (37.4 °C) (03/08/17 1105)  Pulse: 78 (03/08/17 1105)  Resp: 16 (03/08/17 1105)  BP: (!) 157/73 (03/08/17 1105)  SpO2: 96 % (03/08/17 1105)  Weight: 60.9 kg (134 lb 4.2 oz) (03/08/17 0300)  Height: 5' 2" (157.5 cm) (03/07/17 0450)    PICC  Date/Time: 3/8/2017 12:05 PM  Performed by: HENOK ATKINSON  Consent Done: Yes  Time out: Immediately prior to procedure a time out was called to verify the correct patient, procedure, equipment, support staff and site/side marked as required  Indications: med administration and vascular access  Anesthesia: local infiltration  Local anesthetic: lidocaine 1% without epinephrine  Anesthetic Total (mL): 0  Preparation: skin prepped with ChloraPrep  Skin prep agent dried: skin prep agent completely dried prior to procedure  Sterile barriers: all five maximum sterile barriers used - cap, mask, sterile gown, sterile gloves, and large sterile sheet  Hand hygiene: hand hygiene performed prior to central venous catheter insertion  Location details: left brachial  Catheter type: double lumen  Catheter size: 5 Fr  Catheter Length: 36cm    Ultrasound guidance: yes  Vessel Caliber: medium and patent, compressibility normal  Vascular Doppler: not done  Needle advanced into vessel with real time Ultrasound guidance.  Guidewire confirmed in vessel.  Image recorded and saved.  Sterile sheath used.  no esophageal manometryNumber of attempts: 1  Post-procedure: blood return through all ports, chlorhexidine patch and sterile dressing applied  Technical procedures used: 3cg  Specimens: No  Implants: No  Assessment: placement verified by x-ray and tip termination  Complications: none        Yarelis Hammond  3/8/2017  "

## 2017-03-08 NOTE — PHYSICIAN QUERY
PT Name: Zena Aguirre  MR #: [id number]     Physician Query Form - Medication-Correlation for Diagnosis    Reviewer  Ext [id number]   Nohemy Brothers RN    This form is a permanent document in the medical record.     Query Date: March 8, 2017    Dear Provider,   By submitting this query, we are merely seeking further clarification of documentation to reflect the severity of illness of your patient. Please utilize your independent clinical judgment when addressing the question(s) below.                The patient has an order for the following medication(s):    1. potassium chloride 10 mEq in 100 mL IVPB - MAR 3/7  Potassium 2.9 - on Labs 3/7    2.  magnesium sulfate 2g in water 50mL IVPB (premix) - MAR 3/7  Magnesium = 0.8 on 3/7 Labs            Please provide the corresponding diagnosis or diagnoses that supports the use of the medication(s).    1. Hypokalemia    2.  Hypomagnesia

## 2017-03-08 NOTE — PROGRESS NOTES
CT head reviewed showing large MCA stroke with increasing cerebral edema and midline shift.  On the contrary her clinical exam has improved over the last 24 hrs with increased alertness and inconsistent comprehension.  She continues to have a severe right hemiparesis.  I reviewed the images with the family and discussed the likely clinical course with respect to the presence or absence of progression of mca cerebral edema. At this time they (daughter and ) would prefer DNI status but may use vasopressors, CPR, cardioversion/defribillation (one round and abort afterwards) if needed.   Currently on hypertonic saline. Place PICC line and push to Na goal of 140-145.  Mannitol 60g x1  Place arterial line and track pco2  Keep npo for now as risk of aspiration is high. Will need to minimize iatrogenic role in respiratory failure.   Will consult neurosurgery to discuss possible surgical intervention.   Continue close neuro checks and pupillometer assessment q4hrly. Track for changes in NPI

## 2017-03-08 NOTE — PROCEDURES
"Zena Aguirre is a 81 y.o. female patient.    Temp: 99.4 °F (37.4 °C) (17 1105)  Pulse: 79 (17 1505)  Resp: 19 (17 1505)  BP: (!) 157/72 (17 1505)  SpO2: 96 % (17 1505)  Weight: 60.9 kg (134 lb 4.2 oz) (17 0300)  Height: 5' 2" (157.5 cm) (17 0450)       Arterial Line  Date/Time: 3/8/2017 3:42 PM  Location procedure was performed: [facility]  Performed by: ASIA DE DIOS  Authorized by: ASIA DE DIOS   Pre-op Diagnosis: L MCA stroke  Post-operative diagnosis: L MCA stroke  Consent Done: Yes  Consent: Written consent obtained.  Consent given by: spouse  Patient identity confirmed: , MRN and name  Preparation: Patient was prepped and draped in the usual sterile fashion.  Indications: multiple ABGs and hemodynamic monitoring  Location: right radial  Patient sedated: no  Payam's test normal: yes  Needle gauge: 20  Seldinger technique: Seldinger technique used  Number of attempts: 1  Complications: No  Estimated blood loss (mL): 2  Post-procedure: dressing applied  Post-procedure CMS: normal  Patient tolerance: Patient tolerated the procedure well with no immediate complications          Asia De Dios  3/8/2017  "

## 2017-03-08 NOTE — PLAN OF CARE
Problem: Patient Care Overview  Goal: Plan of Care Review  Outcome: Ongoing (interventions implemented as appropriate)  No acute events throughout the day, VS and assessment per flow sheet, patient progressing towards goal as tolerated. Plan of care reviewed with Zena Aguirre and family, all concerns addressed. Will continue to monitor.

## 2017-03-08 NOTE — PLAN OF CARE
SW attempted to go by the room for to check on Pt and family. Pt had MD staff in the room. SW will attempt later.    Gilma Mary LMSW  Neurocritical Care   [facility]  [id number]

## 2017-03-08 NOTE — PT/OT/SLP PROGRESS
Physical Therapy  Treatment    Zena Aguirre   MRN: [id number]   Admitting Diagnosis: Acute ischemic left MCA stroke    PT Received On: 17  PT Start Time: 1318     PT Stop Time: 1353    PT Total Time (min): 35 min       Billable Minutes:  Therapeutic Activity 35    Treatment Type: Treatment  PT/PTA: PT       General Precautions: Standard, aphasia, aspiration, fall, NPO  Orthopedic Precautions: N/A   Braces: N/A    Subjective:  Communicated with RAVI Cohen prior to session.  Pt not following any commands, no vocalizations or verbalizations noted.  Pt's  and family present throughout session.    Pain Ratin/10 (no s/s of distress noted)  Pain Rating Post-Intervention: 0/10    Objective:   Patient found with: blood pressure cuff, pulse ox (continuous), telemetry, NG tube, oxygen    Functional Mobility:  Bed Mobility:    Rolling to the left: Total Assistance    Rolling to the right: Total Assistance   Supine <> Sit: From right sidelying. Total Assistance   Sit <> Supine: To right sidelying. Total Assistance   Scooting to HOB: Total Assistance x 2 people via drawsheet    Sitting Balance at Edge of Bed:   Assistance Level Required: Total Assistance   Time: 5 minutes   Postural deviations noted: Rounded shoulder and Head forward   Encouraged: RUE weight bearing   Comments: pt pushing with LUE, extensor tone of RLE and extensor tone of trunk. Pt with right lateral shift of hips and head. Pt BP elevated from 152/74mmHG to 211/94mmHg during EOB trial, pt returned to supine. BP returned to 170/82mmHg upon returning to supine.    Therapeutic Activities and Exercises:  Supine: RLE PROM x 10 reps in all planes through available ROM. LLE AAROM x 10 reps in all planes through available pain-free ROM. Exercises performed to develop and maintain pt's  ROM and flexibility.     Education:  Patient provided with daily orientation and goals of this PT session. Patient and family agreed to participate in session.Patient  and family aware of patient's deficits and therapy progression. They were provided and educated on proper positioning in supine and in sitting with support of affected RUE extremities in order to increase awareness of extremity and to decrease the effects of immobility, specifically edema and pain. Time provided for therapeutic counseling and discussion of health disposition. All questions answered to family's satisfaction, within scope of PT practice; voiced no other concerns. White board updated in patient's room, RN notified of session.           AM-PAC 6 CLICK MOBILITY  How much help from another person does this patient currently need?   1 = Unable, Total/Dependent Assistance  2 = A lot, Maximum/Moderate Assistance  3 = A little, Minimum/Contact Guard/Supervision  4 = None, Modified [place]/Independent    Turning over in bed (including adjusting bedclothes, sheets and blankets)?: 1  Sitting down on and standing up from a chair with arms (e.g., wheelchair, bedside commode, etc.): 1  Moving from lying on back to sitting on the side of the bed?: 1  Moving to and from a bed to a chair (including a wheelchair)?: 1  Need to walk in hospital room?: 1  Climbing 3-5 steps with a railing?: 1  Total Score: 6    AM-PAC Raw Score CMS G-Code Modifier Level of Impairment Assistance   6 % Total / Unable   7 - 9 CM 80 - 100% Maximal Assist   10 - 14 CL 60 - 80% Moderate Assist   15 - 19 CK 40 - 60% Moderate Assist   20 - 22 CJ 20 - 40% Minimal Assist   23 CI 1-20% SBA / CGA   24 CH 0% Independent/ Mod I     Patient left supine with all lines intact, call button in reach, RN notified and family present.    Assessment:  Zena Aguirre is a 81 y.o. female with a medical diagnosis of Acute ischemic left MCA stroke. Pt continues to require significant assist with bed mobility due to extensor tone. Ms. Kc's deficits affect their ability to safely and independently participate in self-care tasks and functional  mobility. Due to her physical therapy diagnosis of debility and deconditioning, they continue to benefit from acute PT services to address the following limitations: high fall risk, weakness, instability, the need for supervised instructions of exercise, and the decreased ability to perform functional activities which they were able to complete PTA. Education was provided to patient & family regarding importance of continued participation in therapy, patient's progress and discharge disposition. Pt would benefit from SNF in NH upon discharge to improve quality of life and focus on recovery of impairments.     Rehab identified problem list/impairments: Rehab identified problem list/impairments: weakness, impaired endurance, impaired sensation, impaired self care skills, impaired functional mobilty, gait instability, impaired balance, visual deficits, decreased upper extremity function, decreased lower extremity function, decreased safety awareness, abnormal tone    Rehab potential is fair.    Activity tolerance: Fair    Discharge recommendations: Discharge Facility/Level Of Care Needs: nursing facility, skilled (SNF in NH)     Barriers to discharge: Barriers to Discharge: Decreased caregiver support    Equipment recommendations: Equipment Needed After Discharge: hospital bed, wheelchair     GOALS:   Physical Therapy Goals        Problem: Physical Therapy Goal    Goal Priority Disciplines Outcome Goal Variances Interventions   Physical Therapy Goal     PT/OT, PT Ongoing (interventions implemented as appropriate)     Description:  Goals to be met by: 3/21/17     Patient will increase functional independence with mobility by performing:    Supine to sit with Moderate Assistance.  Sit to supine with Moderate Assistance.   Sit to stand transfer with Moderate Assistance using No Assistive Device.  Bed to chair transfer via Squat Pivot with Moderate Assistance using No Assistive Device.  Static sitting at edge of bed x 15  minutes with BUE support and BLE support with Minimal Assistance.  Able to tolerate exercise for 15-20 reps with assistance as needed.  Patient and family able to teachback stroke & positioning education independently.                  PLAN:    Patient to be seen 6 x/week  to address the above listed problems via gait training, therapeutic activities, therapeutic exercises, neuromuscular re-education  Plan of Care expires: 04/07/17  Plan of Care reviewed with: patient, family     Josee Cain PT, DPT  3/8/2017  [phone]

## 2017-03-08 NOTE — PROGRESS NOTES
Serum sodium 128, notified NCC ordered 200ml 3% bolus from bag, and rate increase to 75 ml/hr, will carry out orders, will continue to monitor

## 2017-03-08 NOTE — PT/OT/SLP EVAL
Occupational Therapy  Evaluation    Zena Aguirre   MRN: [id number]   Admitting Diagnosis: Acute ischemic left MCA stroke    OT Date of Treatment: 17   OT Start Time: 855  OT Stop Time: 926  OT Total Time (min): 31 min    Billable Minutes:  Evaluation 21  Therapeutic Activity 10    Diagnosis: Acute ischemic left MCA stroke   Decreased responsiveness, right hemiparesis, s/p tPA    Past Medical History:   Diagnosis Date    Essential hypertension 3/7/2017    Hyperlipidemia 3/7/2017    Hypothyroidism 3/7/2017    Paroxysmal atrial fibrillation 3/7/2017      History reviewed. No pertinent surgical history.    Referring physician: MD Enrike  Date referred to OT: 3/7    General Precautions: Standard, aspiration, fall, NPO (cardiac)    Do you have any cultural, spiritual, Alevism conflicts, given your current situation?: Church     Patient History:  Living Environment  Living Environment Comment: Pt resides with spouse & 22 y.o son that has cerebral palsy in [place], [place] in 1 story house with ramp access.  Pt has tall toilets.    Prior level of function:   Bed Mobility/Transfers: independent  Grooming: independent  Bathing: independent  Upper Body Dressing: independent  Lower Body Dressing: independent  Toileting: independent  Driving License: Yes  Occupation: Retired  Type of Occupation: clerical work  Leisure and Hobbies: Orthodox activities  IADL Comments: Pt has bathtub for bathing.     Dominant hand: right    Subjective:  Communicated with RN prior to session.  Pt with no verbalizations.  Pt's family reported that pt has not been speaking.  Chief Complaint: none stated  Patient/Family stated goals: for pt to get better    Pain Ratin/10  Pain Rating Post-Intervention: 0/10    Objective:  Patient found with: blood pressure cuff, peripheral IV, telemetry, SCD, pulse ox (continuous), sesay catheter    Cognitive Exam:  Oriented to: pt with no response  Follows Commands/attention: did not follow  commands  Communication: expressive aphasia and receptive aphasia  Memory:  JUAN CARLOS  Safety awareness/insight to disability: impaired  Coping skills/emotional control: Appropriate to situation    Visual/perceptual:  JUAN CARLOS fully, pt with minimal eye contact    Physical Exam:  Postural examination/scapula alignment: Rounded shoulder, Head forward, Posterior pelvic tilt and pushing with LUE, extensor tone throughout RLE, RUE & trunk & hips  Skin integrity: Visible skin intact  Edema: None noted BUE    Sensation:   JUAN CARLOS fully no response to pinch on BUE    Upper Extremity Range of Motion:  Right Upper Extremity: PROM WFL (increased tone noted, trace wrist flexion & supination noted)  Left Upper Extremity: AAROM WFL    Upper Extremity Strength:  Right Upper Extremity: 0/5 except 1/5 wrist flexion & supination  Left Upper Extremity: JUAN CARLOS fully but at least 4/5 elbow & shoulder flexion/extension   Strength: impaired RUE    Fine motor coordination:   Impaired RUE    Gross motor coordination: impaired RUE    Functional Mobility:  Bed Mobility:  Rolling/Turning to Left: Total assistance  Rolling/Turning Right: Total assistance  Scooting/Bridging: Total Assistance (scooting forward on EOB & up HOB while supine)  Supine to Sit: Total Assistance  Sit to Supine: Total Assistance    Activities of Daily Living:     LE Dressing Level of Assistance: Total assistance  Grooming Position: Seated, EOB  Grooming Level of Assistance: Total assistance     Therapeutic Activities and Exercises:  Pt with pushing with LUE while seated EOB.  Pt with head tilted laterally.  Pt with extension at RLE while seated EOB.  Pt required verbal & physical cues to facilitate postural control & decrease LUE pushing while seated EOB. Provided education to pt & pt's family regarding role of OT & POC.    AM-PAC 6 CLICK ADL  How much help from another person does this patient currently need?  1 = Unable, Total/Dependent Assistance  2 = A lot, Maximum/Moderate  "Assistance  3 = A little, Minimum/Contact Guard/Supervision  4 = None, Modified [place]/Independent    Putting on and taking off regular lower body clothing? : 1  Bathing (including washing, rinsing, drying)?: 1  Toileting, which includes using toilet, bedpan, or urinal? : 1  Putting on and taking off regular upper body clothing?: 1  Taking care of personal grooming such as brushing teeth?: 1  Eating meals?: 1  Total Score: 6    AM-PAC Raw Score CMS "G-Code Modifier Level of Impairment Assistance   6 % Total / Unable   7 - 9 CM 80 - 100% Maximal Assist   10 - 14 CL 60 - 80% Moderate Assist   15 - 19 CK 40 - 60% Moderate Assist   20 - 22 CJ 20 - 40% Minimal Assist   23 CI 1-20% SBA / CGA   24 CH 0% Independent/ Mod I       Patient left supine with all lines intact, call button in reach, RN notified, family present and white board updated.    Assessment:  Zena Aguirre is a 81 y.o. female with a medical diagnosis of Acute ischemic left MCA stroke and presents with extensive deficits in cognition, ADL's, transfers, bed mobility, postural control & muscle tone affecting all ADL's and safety.  Pt would benefit from OT to address independence with ADL's.    Rehab identified problem list/impairments: Rehab identified problem list/impairments: weakness, impaired endurance, impaired sensation, impaired functional mobilty, impaired self care skills, impaired balance, decreased coordination, decreased safety awareness, decreased upper extremity function, impaired cognition, decreased lower extremity function    Rehab potential is fair.    Activity tolerance: Fair    Discharge recommendations: Discharge Facility/Level Of Care Needs: nursing facility, skilled     GOALS:   Occupational Therapy Goals        Problem: Occupational Therapy Goal    Goal Priority Disciplines Outcome Interventions   Occupational Therapy Goal     OT, PT/OT Ongoing (interventions implemented as appropriate)    Description:  Goals to be met by: " 3/15/17     Patient will increase functional independence with ADLs by performing:    UE Dressing with Moderate Assistance.  LE Dressing with Maximum Assistance.  Grooming while seated with Moderate Assistance.  Toileting from bedside commode with Maximum Assistance for hygiene and clothing management.   Rolling to Bilateral with Moderate Assistance.   Supine to sit with Maximum Assistance.  Stand pivot transfers with Maximum Assistance.  Pt will follow 4/4 one step commands.                PLAN:  Patient to be seen 6 x/week to address the above listed problems via self-care/home management, neuromuscular re-education, cognitive retraining, sensory integration, therapeutic activities, therapeutic exercises  Plan of Care expires: 04/07/17  Plan of Care reviewed with: patient, spouse, daughter    OT G-codes  Functional Assessment Tool Used: [facility]  Score: 6  Functional Limitation: Self care  Self Care Current Status (): CN  Self Care Goal Status (): VERONA Walker  03/08/2017

## 2017-03-08 NOTE — CONSULTS
Double lumen PICC placed in left brachial  vein. 36 cm in length with 0 cm exposed. Arm Circumference 25 cm.LOT#[id number]

## 2017-03-08 NOTE — PLAN OF CARE
Problem: SLP Goal  Goal: SLP Goal  Updated Speech Language Pathology Goals  Goals expected to be met by 3/22  1. Pt will participate in ongoing swallowing assessment to determine if safe to begin PO intake.  2. Pt will vocalize x1 during session.  3. Pt will follow simple, one step commands given max cues with 70% acc to improve overall receptive language  4. Pt will answer simple y/n questions in any modality with 70% acc to improve overall receptive language skills  5. Pt will identify objects in FO2 via eye gaze with 60% acc to improve overall receptive language skills      Speech Language Pathology Goals  Goals expected to be met by 3/14:  1. Pt will participate in ongoing swallowing assessment to determine if safe to begin PO intake.  2. Pt will participate in speech/language evaluation to determine further goals.      Outcome: Revised  Speech/Language/Cognitive Evaluation completed. SAMINA Iniguez, CCC-SLP, CLC, 3/8/2017

## 2017-03-08 NOTE — PLAN OF CARE
Problem: Occupational Therapy Goal  Goal: Occupational Therapy Goal  Goals to be met by: 3/15/17     Patient will increase functional independence with ADLs by performing:    UE Dressing with Moderate Assistance.  LE Dressing with Maximum Assistance.  Grooming while seated with Moderate Assistance.  Toileting from bedside commode with Maximum Assistance for hygiene and clothing management.   Rolling to Bilateral with Moderate Assistance.   Supine to sit with Maximum Assistance.  Stand pivot transfers with Maximum Assistance.  Pt will follow 4/4 one step commands.  Outcome: Ongoing (interventions implemented as appropriate)  OT eval completed.

## 2017-03-08 NOTE — PT/OT/SLP EVAL
Speech Language Pathology Evaluation    Zena Aguirre   MRN: [id number]   [id number] A      Admitting Diagnosis: Acute ischemic left MCA stroke    Diet recommendations:   Solid Diet Level: NPO    Liquid Diet Level: NPO   Continue alternative means of nutrition, hydration and medication  Ongoing assessment of swallow function and safety with SLP     SLP Treatment Date: 17  Speech Start Time: 1100     Speech Stop Time: 1117     Speech Total (min): 17 min       TREATMENT BILLABLE MINUTES:  Eval 9  and Treatment Swallowing Dysfunction 8    Diagnosis: Acute ischemic left MCA stroke  No intubation history for this hospitalization    Past Medical History:   Diagnosis Date    Essential hypertension 3/7/2017    Hyperlipidemia 3/7/2017    Hypothyroidism 3/7/2017    Paroxysmal atrial fibrillation 3/7/2017     History reviewed. No pertinent surgical history.    Has the patient been evaluated by SLP for swallowing? : Yes  Keep patient NPO?: Yes   General Precautions: Standard, aspiration, aphasia, NPO, fall    Current Respiratory Status: other (comment) (room air)       Subjective:  No vocalizations elicited.   present at bedside. Daughter in and out of room during session  RN okayed evaluation and treatment session.      Pain Ratin/10 (No comment regarding pain; no nonverbal indicators of pain )       Objective:   Patient found with: blood pressure cuff, pulse ox (continuous), telemetry, NG tube    Oral Musculature Evaluation  Oral Musculature: unable to assess due to poor participation/comprehension  Dentition: upper and lower dentures  Mucosal Quality: sticky  Volitional Cough: unable to follow commands  Volitional Swallow: unable to follow commands  Voice Prior to PO Intake: no vocal output     Cognitive Status:  Behavioral Observations: Cooperative although limited by aphasia-  Memory and Orientation: Unable to assess 2/2 aphasia; pt did not respond to orientation questions   Attention: Reduced attention  evident requiring verbal stimulation to attend to tasks.  Problem Solving: Unable to assess 2/2 aphasia;   Pragmatics: flat affect  Executive Function: Assessment limited by aphasia    Auditory Comprehension: Significantly impaired receptive language with simple tasks  Able to identify objects - 0% acc   Answers yes/no questions -  0% acc with no response elicited; pt did not shake/nod head, verbally respond or use eye gaze when presented written yes/no option  Able to follow commands - 0% acc given model and tactile cues    Verbal Expression: Significantly impaired expressive language with no vocalizations elicited   Pt did not respond to cues to produce single vowel sounds. Pt did not respond to automatic speech tasks (counting 1-5) given verbal and visual cues.     Motor Speech: Assessment limited by aphasia; oral apraxia suspected although unable to determine if apraxia vs receptive language deficit or both    Voice: Unable to assess 2/2 aphasia and no vocalizations elicited    Reading: Pt did not respond to written yes/no during receptive language tasks    Writing: Not assessed    Visual-Spatial: Not assessed    Additional Treatment:    Pt seen for ongoing assessment of swallow function and safety. Pt assessed with thin liquids via 1/2 tsp x3 and nectar thick liquids via tsp x3. Oral phase c/b delayed mouth opening to accept spoon with reduced spoon stripping ability. Prolonged a-p transit noted with delayed oral transit. Pharyngeal phase c/b delayed trigger of swallow with reduced hyolaryngeal elevation suspected via palpation. Pt with coughing elicited following thin and nectar thick liquid trials. No further po trials attempted 2/2 high risk for aspiration.     FIM:  Social Interaction: 1 Total Assistance--The patient interact appropriately les than 25% of the time, or not at all, and may need restraint due to socially inappropriate behaviors.   Problem Solvin Total Assistance--The patient solves routine  problems less than 25% of the time, or does not effectively solve problems, and may require constant one-to-one direction to complete simple daily activities.  The patient may need a restraint for safety.   Comprehension: 1 Total Assistance--The patient understands direction and conversation about basic daily needs less than 25% of the time, or does no understand simple, commonly used spoken expressions (e.g hello, how are you) or gestures (e.g. waving good-bye, thank   Expression: 1 Total Assistance--The patient expresses basic daily needs and ideas less than 25% of the time, or does not express basic needs appropriately or consistently despite prompting.   Memory: 1 Total Assistance--The patient recognizes and remembers less than 25% of the time, or does not effectively recognize and remember.     Assessment:  Zena Aguirre is a 81 y.o. female with a SLP diagnosis of Aphasia, Dysphagia and Apraxia. She present with high risk for aspiration.  Do you have any cultural, spiritual, Jew conflicts, given your current situation?: aphasia    Discharge recommendations: Discharge Facility/Level Of Care Needs: nursing facility, skilled (SNF in NH)     Goals:   SLP Goals        Problem: SLP Goal    Goal Priority Disciplines Outcome   SLP Goal     SLP Revised   Description:  Updated Speech Language Pathology Goals  Goals expected to be met by 3/22  1. Pt will participate in ongoing swallowing assessment to determine if safe to begin PO intake.  2. Pt will vocalize x1 during session.  3. Pt will follow simple, one step commands given max cues with 70% acc to improve overall receptive language  4. Pt will answer simple y/n questions in any modality with 70% acc to improve overall receptive language skills  5. Pt will identify objects in FO2 via eye gaze with 60% acc to improve overall receptive language skills      Speech Language Pathology Goals  Goals expected to be met by 3/14:  1. Pt will participate in ongoing  swallowing assessment to determine if safe to begin PO intake.  2. Pt will participate in speech/language evaluation to determine further goals.                     Plan:   Patient to be seen Therapy Frequency: 5 x/week   Plan of Care expires: 04/05/17  Plan of Care reviewed with: patient, spouse, daughter  SLP Follow-up?: Yes         SAMINA Sage, CCC-SLP, CLC  Speech Language Pathologist  Certified Lactation Counselor  [phone]  3/8/2017

## 2017-03-08 NOTE — NURSING
0905- pt /98 notified NCC ordered prn labetalol administered    0930- Bp reassessed 158/74. Will continue to monitor

## 2017-03-09 PROBLEM — G93.6 CYTOTOXIC CEREBRAL EDEMA: Status: ACTIVE | Noted: 2017-03-09

## 2017-03-09 LAB
ALBUMIN SERPL BCP-MCNC: 2.8 G/DL
ALBUMIN SERPL BCP-MCNC: 2.9 G/DL
ALLENS TEST: ABNORMAL
ALP SERPL-CCNC: 41 U/L
ALP SERPL-CCNC: 42 U/L
ALT SERPL W/O P-5'-P-CCNC: 22 U/L
ALT SERPL W/O P-5'-P-CCNC: 22 U/L
ANION GAP SERPL CALC-SCNC: 10 MMOL/L
ANION GAP SERPL CALC-SCNC: 9 MMOL/L
AST SERPL-CCNC: 42 U/L
AST SERPL-CCNC: 45 U/L
BASOPHILS # BLD AUTO: 0.01 K/UL
BASOPHILS NFR BLD: 0.1 %
BILIRUB SERPL-MCNC: 1 MG/DL
BILIRUB SERPL-MCNC: 1.2 MG/DL
BUN SERPL-MCNC: 15 MG/DL
BUN SERPL-MCNC: 17 MG/DL
CALCIUM SERPL-MCNC: 7.8 MG/DL
CALCIUM SERPL-MCNC: 8.1 MG/DL
CHLORIDE SERPL-SCNC: 118 MMOL/L
CHLORIDE SERPL-SCNC: 122 MMOL/L
CO2 SERPL-SCNC: 20 MMOL/L
CO2 SERPL-SCNC: 20 MMOL/L
CREAT SERPL-MCNC: 0.8 MG/DL
CREAT SERPL-MCNC: 0.8 MG/DL
DELSYS: ABNORMAL
DIFFERENTIAL METHOD: ABNORMAL
EOSINOPHIL # BLD AUTO: 0 K/UL
EOSINOPHIL NFR BLD: 0 %
ERYTHROCYTE [DISTWIDTH] IN BLOOD BY AUTOMATED COUNT: 13.9 %
EST. GFR  (AFRICAN AMERICAN): >60 ML/MIN/1.73 M^2
EST. GFR  (AFRICAN AMERICAN): >60 ML/MIN/1.73 M^2
EST. GFR  (NON AFRICAN AMERICAN): >60 ML/MIN/1.73 M^2
EST. GFR  (NON AFRICAN AMERICAN): >60 ML/MIN/1.73 M^2
GLUCOSE SERPL-MCNC: 146 MG/DL
GLUCOSE SERPL-MCNC: 148 MG/DL
HCO3 UR-SCNC: 16.5 MMOL/L (ref 24–28)
HCO3 UR-SCNC: 17.9 MMOL/L (ref 24–28)
HCO3 UR-SCNC: 18.6 MMOL/L (ref 24–28)
HCO3 UR-SCNC: 18.9 MMOL/L (ref 24–28)
HCO3 UR-SCNC: 19.1 MMOL/L (ref 24–28)
HCO3 UR-SCNC: 20.2 MMOL/L (ref 24–28)
HCT VFR BLD AUTO: 32.3 %
HGB BLD-MCNC: 11.1 G/DL
LYMPHOCYTES # BLD AUTO: 1.2 K/UL
LYMPHOCYTES NFR BLD: 12.7 %
MAGNESIUM SERPL-MCNC: 1.9 MG/DL
MCH RBC QN AUTO: 33 PG
MCHC RBC AUTO-ENTMCNC: 34.4 %
MCV RBC AUTO: 96 FL
MODE: ABNORMAL
MONOCYTES # BLD AUTO: 1.7 K/UL
MONOCYTES NFR BLD: 17.4 %
NEUTROPHILS # BLD AUTO: 6.6 K/UL
NEUTROPHILS NFR BLD: 69.6 %
PCO2 BLDA: 22.3 MMHG (ref 35–45)
PCO2 BLDA: 25.8 MMHG (ref 35–45)
PCO2 BLDA: 26.1 MMHG (ref 35–45)
PCO2 BLDA: 26.8 MMHG (ref 35–45)
PCO2 BLDA: 27.2 MMHG (ref 35–45)
PCO2 BLDA: 29.2 MMHG (ref 35–45)
PH SMN: 7.45 [PH] (ref 7.35–7.45)
PH SMN: 7.46 [PH] (ref 7.35–7.45)
PH SMN: 7.47 [PH] (ref 7.35–7.45)
PH SMN: 7.48 [PH] (ref 7.35–7.45)
PHOSPHATE SERPL-MCNC: 3.1 MG/DL
PLATELET # BLD AUTO: 170 K/UL
PMV BLD AUTO: 9.4 FL
PO2 BLDA: 66 MMHG (ref 80–100)
PO2 BLDA: 69 MMHG (ref 80–100)
PO2 BLDA: 71 MMHG (ref 80–100)
PO2 BLDA: 75 MMHG (ref 80–100)
PO2 BLDA: 77 MMHG (ref 80–100)
PO2 BLDA: 79 MMHG (ref 80–100)
POC BE: -4 MMOL/L
POC BE: -5 MMOL/L
POC BE: -6 MMOL/L
POC BE: -7 MMOL/L
POC SATURATED O2: 94 % (ref 95–100)
POC SATURATED O2: 95 % (ref 95–100)
POC SATURATED O2: 95 % (ref 95–100)
POC SATURATED O2: 96 % (ref 95–100)
POC SATURATED O2: 96 % (ref 95–100)
POC SATURATED O2: 97 % (ref 95–100)
POC TCO2: 17 MMOL/L (ref 23–27)
POC TCO2: 19 MMOL/L (ref 23–27)
POC TCO2: 19 MMOL/L (ref 23–27)
POC TCO2: 20 MMOL/L (ref 23–27)
POC TCO2: 20 MMOL/L (ref 23–27)
POC TCO2: 21 MMOL/L (ref 23–27)
POCT GLUCOSE: 123 MG/DL (ref 70–110)
POCT GLUCOSE: 141 MG/DL (ref 70–110)
POCT GLUCOSE: 143 MG/DL (ref 70–110)
POCT GLUCOSE: 158 MG/DL (ref 70–110)
POCT GLUCOSE: 159 MG/DL (ref 70–110)
POTASSIUM SERPL-SCNC: 3 MMOL/L
POTASSIUM SERPL-SCNC: 3.9 MMOL/L
PROT SERPL-MCNC: 6.5 G/DL
PROT SERPL-MCNC: 6.8 G/DL
PROVIDER CREDENTIALS: ABNORMAL
PROVIDER NOTIFIED: ABNORMAL
RBC # BLD AUTO: 3.36 M/UL
SAMPLE: ABNORMAL
SITE: ABNORMAL
SODIUM SERPL-SCNC: 141 MMOL/L
SODIUM SERPL-SCNC: 147 MMOL/L
SODIUM SERPL-SCNC: 149 MMOL/L
SODIUM SERPL-SCNC: 150 MMOL/L
SODIUM SERPL-SCNC: 152 MMOL/L
SP02: 92
SP02: 96
SP02: 97
TIME NOTIFIED: 858
WBC # BLD AUTO: 9.5 K/UL

## 2017-03-09 PROCEDURE — 25000003 PHARM REV CODE 250: Performed by: PHYSICIAN ASSISTANT

## 2017-03-09 PROCEDURE — 99233 SBSQ HOSP IP/OBS HIGH 50: CPT | Mod: ,,, | Performed by: PSYCHIATRY & NEUROLOGY

## 2017-03-09 PROCEDURE — 20000000 HC ICU ROOM

## 2017-03-09 PROCEDURE — 80053 COMPREHEN METABOLIC PANEL: CPT

## 2017-03-09 PROCEDURE — 25000242 PHARM REV CODE 250 ALT 637 W/ HCPCS: Performed by: PHYSICIAN ASSISTANT

## 2017-03-09 PROCEDURE — 92507 TX SP LANG VOICE COMM INDIV: CPT

## 2017-03-09 PROCEDURE — 99900035 HC TECH TIME PER 15 MIN (STAT)

## 2017-03-09 PROCEDURE — 84295 ASSAY OF SERUM SODIUM: CPT

## 2017-03-09 PROCEDURE — 94640 AIRWAY INHALATION TREATMENT: CPT

## 2017-03-09 PROCEDURE — 94761 N-INVAS EAR/PLS OXIMETRY MLT: CPT

## 2017-03-09 PROCEDURE — 84295 ASSAY OF SERUM SODIUM: CPT | Mod: 91

## 2017-03-09 PROCEDURE — 25000003 PHARM REV CODE 250: Performed by: PSYCHIATRY & NEUROLOGY

## 2017-03-09 PROCEDURE — 99291 CRITICAL CARE FIRST HOUR: CPT | Mod: ,,, | Performed by: PSYCHIATRY & NEUROLOGY

## 2017-03-09 PROCEDURE — 37799 UNLISTED PX VASCULAR SURGERY: CPT

## 2017-03-09 PROCEDURE — 63600175 PHARM REV CODE 636 W HCPCS: Performed by: PHYSICIAN ASSISTANT

## 2017-03-09 PROCEDURE — 92526 ORAL FUNCTION THERAPY: CPT

## 2017-03-09 PROCEDURE — 99292 CRITICAL CARE ADDL 30 MIN: CPT | Mod: ,,, | Performed by: PSYCHIATRY & NEUROLOGY

## 2017-03-09 PROCEDURE — 82803 BLOOD GASES ANY COMBINATION: CPT

## 2017-03-09 PROCEDURE — 83735 ASSAY OF MAGNESIUM: CPT

## 2017-03-09 PROCEDURE — 84100 ASSAY OF PHOSPHORUS: CPT

## 2017-03-09 PROCEDURE — 80053 COMPREHEN METABOLIC PANEL: CPT | Mod: 91

## 2017-03-09 PROCEDURE — 85025 COMPLETE CBC W/AUTO DIFF WBC: CPT

## 2017-03-09 PROCEDURE — 25000003 PHARM REV CODE 250

## 2017-03-09 PROCEDURE — 94760 N-INVAS EAR/PLS OXIMETRY 1: CPT

## 2017-03-09 RX ORDER — HYDRALAZINE HYDROCHLORIDE 20 MG/ML
INJECTION INTRAMUSCULAR; INTRAVENOUS
Status: COMPLETED
Start: 2017-03-09 | End: 2017-03-09

## 2017-03-09 RX ORDER — METOPROLOL TARTRATE 1 MG/ML
INJECTION, SOLUTION INTRAVENOUS
Status: COMPLETED
Start: 2017-03-09 | End: 2017-03-09

## 2017-03-09 RX ORDER — NAPROXEN SODIUM 220 MG/1
81 TABLET, FILM COATED ORAL DAILY
Status: DISCONTINUED | OUTPATIENT
Start: 2017-03-09 | End: 2017-03-13

## 2017-03-09 RX ORDER — IPRATROPIUM BROMIDE AND ALBUTEROL SULFATE 2.5; .5 MG/3ML; MG/3ML
3 SOLUTION RESPIRATORY (INHALATION) EVERY 8 HOURS
Status: DISCONTINUED | OUTPATIENT
Start: 2017-03-09 | End: 2017-03-22 | Stop reason: HOSPADM

## 2017-03-09 RX ORDER — HYDRALAZINE HYDROCHLORIDE 20 MG/ML
10 INJECTION INTRAMUSCULAR; INTRAVENOUS EVERY 4 HOURS PRN
Status: DISCONTINUED | OUTPATIENT
Start: 2017-03-09 | End: 2017-03-15

## 2017-03-09 RX ORDER — DILTIAZEM HYDROCHLORIDE 60 MG/1
60 TABLET, FILM COATED ORAL EVERY 8 HOURS
Status: DISCONTINUED | OUTPATIENT
Start: 2017-03-09 | End: 2017-03-12

## 2017-03-09 RX ADMIN — ASPIRIN 81 MG CHEWABLE TABLET 81 MG: 81 TABLET CHEWABLE at 02:03

## 2017-03-09 RX ADMIN — HYDRALAZINE HYDROCHLORIDE 10 MG: 20 INJECTION INTRAMUSCULAR; INTRAVENOUS at 03:03

## 2017-03-09 RX ADMIN — Medication 10 ML: at 05:03

## 2017-03-09 RX ADMIN — DILTIAZEM HYDROCHLORIDE 30 MG: 30 TABLET, FILM COATED ORAL at 05:03

## 2017-03-09 RX ADMIN — VITAMIN C 1 TABLET: TAB at 08:03

## 2017-03-09 RX ADMIN — ATORVASTATIN CALCIUM 40 MG: 20 TABLET, FILM COATED ORAL at 08:03

## 2017-03-09 RX ADMIN — STANDARDIZED SENNA CONCENTRATE AND DOCUSATE SODIUM 1 TABLET: 8.6; 5 TABLET, FILM COATED ORAL at 09:03

## 2017-03-09 RX ADMIN — VITAMIN D, TAB 1000IU (100/BT) 1000 UNITS: 25 TAB at 08:03

## 2017-03-09 RX ADMIN — HYDROCHLOROTHIAZIDE 25 MG: 12.5 TABLET ORAL at 08:03

## 2017-03-09 RX ADMIN — Medication 10 ML: at 11:03

## 2017-03-09 RX ADMIN — METOPROLOL TARTRATE 5 MG: 5 INJECTION INTRAVENOUS at 06:03

## 2017-03-09 RX ADMIN — LABETALOL HYDROCHLORIDE 10 MG: 5 INJECTION, SOLUTION INTRAVENOUS at 02:03

## 2017-03-09 RX ADMIN — CALCIUM 500 MG: 500 TABLET ORAL at 08:03

## 2017-03-09 RX ADMIN — IPRATROPIUM BROMIDE AND ALBUTEROL SULFATE 3 ML: .5; 3 SOLUTION RESPIRATORY (INHALATION) at 03:03

## 2017-03-09 RX ADMIN — STANDARDIZED SENNA CONCENTRATE AND DOCUSATE SODIUM 1 TABLET: 8.6; 5 TABLET, FILM COATED ORAL at 08:03

## 2017-03-09 RX ADMIN — Medication 3 ML: at 02:03

## 2017-03-09 RX ADMIN — Medication 3 ML: at 05:03

## 2017-03-09 RX ADMIN — DILTIAZEM HYDROCHLORIDE 60 MG: 60 TABLET, FILM COATED ORAL at 09:03

## 2017-03-09 RX ADMIN — LEVOTHYROXINE SODIUM 25 MCG: 25 TABLET ORAL at 05:03

## 2017-03-09 RX ADMIN — DILTIAZEM HYDROCHLORIDE 30 MG: 30 TABLET, FILM COATED ORAL at 02:03

## 2017-03-09 RX ADMIN — CALCIUM 500 MG: 500 TABLET ORAL at 04:03

## 2017-03-09 RX ADMIN — POTASSIUM CHLORIDE 40 MEQ: 400 INJECTION, SOLUTION INTRAVENOUS at 10:03

## 2017-03-09 RX ADMIN — SODIUM CHLORIDE 75 ML/HR: 3 INJECTION, SOLUTION INTRAVENOUS at 02:03

## 2017-03-09 RX ADMIN — POTASSIUM CHLORIDE 40 MEQ: 400 INJECTION, SOLUTION INTRAVENOUS at 07:03

## 2017-03-09 RX ADMIN — Medication 3 ML: at 09:03

## 2017-03-09 RX ADMIN — Medication 10 ML: at 12:03

## 2017-03-09 RX ADMIN — ACETAMINOPHEN 650 MG: 325 TABLET ORAL at 11:03

## 2017-03-09 RX ADMIN — IPRATROPIUM BROMIDE AND ALBUTEROL SULFATE 3 ML: .5; 3 SOLUTION RESPIRATORY (INHALATION) at 11:03

## 2017-03-09 NOTE — PROGRESS NOTES
Progress Note  Neurosurgery    Admit Date: 3/7/2017  Post-operative Day:    Hospital Day: 3    SUBJECTIVE:     Zena Aguirre is a 81 y.o. female with left MCA stroke s/p tPA after decreased responsiveness and right hemiparesis on 3/6.  Follow-up For:  * No surgery found *     NAEON      Scheduled Meds:   atorvastatin  40 mg Per NG tube Daily    B-complex with vitamin C  1 tablet Per NG tube Daily    calcium carbonate  500 mg Per NG tube BID WM    diltiaZEM  30 mg Per NG tube Q8H    hydrochlorothiazide  25 mg Per NG tube Daily    levothyroxine  25 mcg Per NG tube Before breakfast    senna-docusate 8.6-50 mg  1 tablet Per NG tube BID    sodium chloride 0.9%  10 mL Intravenous Q6H    sodium chloride 0.9%  3 mL Intravenous Q8H    sodium chloride 3%  250 mL Intravenous Once    vitamin D  1,000 Units Per NG tube Daily     Continuous Infusions:   sodium chloride 3% 75 mL/hr (03/09/17 0900)     PRN Meds:acetaminophen, hydrALAZINE, flu vacc [id number] 65yr up(PF), labetalol, magnesium sulfate IVPB, magnesium sulfate IVPB, pneumoc 13-evette conj-dip cr(PF), potassium chloride **AND** potassium chloride **AND** potassium chloride, Flushing PICC Protocol **AND** sodium chloride 0.9% **AND** sodium chloride 0.9%, sodium phosphate IVPB, sodium phosphate IVPB, sodium phosphate IVPB    Review of patient's allergies indicates:   Allergen Reactions    Sulfa (sulfonamide antibiotics) Itching       OBJECTIVE:     Vital Signs (Most Recent)  Temp: 98.8 °F (37.1 °C) (03/09/17 0705)  Pulse: 89 (03/09/17 0900)  Resp: (!) 29 (03/09/17 0900)  BP: (!) 159/74 (03/09/17 0800)  SpO2: 97 % (03/09/17 0900)    Vital Signs Range (Last 24H):  Temp:  [98.6 °F (37 °C)-100.7 °F (38.2 °C)]   Pulse:  []   Resp:  [14-29]   BP: (146-189)/()   SpO2:  [94 %-97 %]   Arterial Line BP: (152-187)/(56-81)     I & O (Last 24H):  Intake/Output Summary (Last 24 hours) at 03/09/17 1013  Last data filed at 03/09/17 0900   Gross per 24 hour   Intake            2422.5 ml   Output             3395 ml   Net           -972.5 ml     Physical Exam:  [id number]  PERRL, aphasic  WD in LUE, BLE.  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      Lines/Drains:       PICC Double Lumen 03/08/17 1208 left brachial (Active)   Site Assessment Clean;Dry;Intact 3/9/2017  7:05 AM   Lumen 1 Status Infusing 3/9/2017  7:05 AM   Lumen 2 Status Infusing 3/9/2017  7:05 AM   Length verónica (cm) 36 cm 3/8/2017 12:05 PM   Current Exposed Catheter (cm) 0 cm 3/8/2017 12:05 PM   Extremity Circumference (cm) 25 cm 3/8/2017 12:05 PM   Dressing Type Transparent 3/9/2017  7:05 AM   Dressing Status Biopatch in place;Clean;Dry;Intact 3/9/2017  7:05 AM   Dressing Intervention Dressing reinforced 3/9/2017  7:05 AM   Dressing Change Due 03/15/17 3/9/2017  7:05 AM   Daily Line Review Performed 3/9/2017  7:05 AM   Number of days:0            Peripheral IV - Single Lumen 03/06/17 Right Antecubital (Active)   Site Assessment Clean;Dry;Intact 3/9/2017  7:05 AM   Line Status Infusing 3/9/2017  7:05 AM   Dressing Status Clean;Dry;Intact 3/9/2017  7:05 AM   Dressing Intervention Dressing reinforced 3/9/2017  7:05 AM   Dressing Change Due 03/10/17 3/9/2017  7:05 AM   Site Change Due 03/10/17 3/9/2017  7:05 AM   Reason Not Rotated Not due 3/9/2017  7:05 AM   Number of days:3            Peripheral IV - Single Lumen 03/08/17 1100 Left Hand (Active)   Site Assessment Clean;Dry;Intact 3/9/2017  7:05 AM   Line Status Flushed;Saline locked 3/9/2017  7:05 AM   Dressing Status Clean;Dry;Intact 3/9/2017  7:05 AM   Dressing Intervention Dressing reinforced 3/9/2017  7:05 AM   Dressing Change Due 03/12/17 3/9/2017  7:05 AM   Site Change Due 03/12/17 3/9/2017  7:05 AM   Reason Not Rotated Not due 3/9/2017  7:05 AM   Number of days:0            Arterial Line 03/08/17 1530 Right Radial (Active)   Site Assessment Clean;Dry;Intact 3/9/2017  7:05 AM   Line Status Pulsatile blood flow 3/9/2017  7:05 AM   Art Line Waveform Appropriate;Square wave test performed  "3/9/2017  7:05 AM   Arterial Line Interventions Zeroed and calibrated;Leveled;Connections checked and tightened;Flushed per protocol 3/9/2017  7:05 AM   Color/Movement/Sensation Capillary refill less than 3 sec 3/9/2017  7:05 AM   Dressing Type Transparent 3/9/2017  7:05 AM   Dressing Status Biopatch in place;Clean;Dry;Intact 3/9/2017  7:05 AM   Dressing Intervention Dressing reinforced 3/9/2017  7:05 AM   Dressing Change Due 03/15/17 3/9/2017  7:05 AM   Number of days:0            NG/OG Tube 03/08/17 0615 Burt sump 14 Fr. Right nostril (Active)   Placement Check placement verified by distal tube length measurement;placement verified by x-ray 3/9/2017  7:05 AM   Distal Tube Length (cm) 63 3/9/2017  7:05 AM   Clamp Status/Tolerance clamped;no abdominal discomfort;no abdominal distention;no emesis;no nausea;no residual;no restlessness 3/9/2017  7:05 AM   Flush/Irrigation flushed w/;water;no resistance met 3/9/2017  3:05 AM   Intake (mL) 100 mL 3/9/2017  8:00 AM   Residual Amount (ml) 0 ml 3/8/2017 11:03 PM   Number of days:1            Urethral Catheter 03/06/17 (Active)   Site Assessment Clean;Intact;Dry 3/9/2017  7:05 AM   Collection Container Urimeter 3/9/2017  7:05 AM   Securement Method secured to top of thigh w/ adhesive device 3/9/2017  7:05 AM   Catheter Care Performed yes 3/9/2017  7:05 AM   Reason for Continuing Urinary Catheterization Critically ill in ICU requiring intensive monitoring 3/9/2017  7:05 AM   CAUTI Prevention Bundle StatLock in place w 1" slack;Intact seal between catheter & drainage tubing;Drainage bag off the floor;Green sheeting clip in use;No dependent loops or kinks;Drainage bag not overfilled (<2/3 full);Drainage bag below bladder 3/9/2017  7:05 AM   Output (mL) 175 mL 3/9/2017  9:00 AM   Number of days:3       Wound/Incision:  na    Laboratory:  CBC:   Recent Labs  Lab 03/09/17  0200   WBC 9.50   RBC 3.36*   HGB 11.1*   HCT 32.3*      MCV 96   MCH 33.0*   MCHC 34.4     CMP: "   Recent Labs  Lab 03/09/17  0200  03/09/17  0848   *  --   --    CALCIUM 7.8*  --   --    ALBUMIN 2.8*  --   --    PROT 6.5  --   --    *  < > 150*   K 3.9  --   --    CO2 20*  --   --    *  --   --    BUN 15  --   --    CREATININE 0.8  --   --    ALKPHOS 42*  --   --    ALT 22  --   --    AST 45*  --   --    BILITOT 1.2*  --   --    < > = values in this interval not displayed.  Coagulation:   Recent Labs  Lab 03/07/17  0749   INR 1.1   APTT 21.2     Cardiac markers: No results for input(s): CKMB, CPKMB, TROPONINT, TROPONINI, MYOGLOBIN in the last 168 hours.  No results for input(s): COLORU, CLARITYU, SPECGRAV, PHUR, PROTEINUA, GLUCOSEU, BILIRUBINCON, BLOODU, WBCU, RBCU, BACTERIA, MUCUS, NITRITE, LEUKOCYTESUR, UROBILINOGEN, HYALINECASTS in the last 168 hours.      Diagnostic Results:      ASSESSMENT/PLAN:     Assessment: 81 F with left MCA stroke s/p tPA on 3/6.  -q 1 hr neuro checks  -SBP less than 180  -Goal Na 145-155  -No sedation  -Maximal medical mgmt per NCC  -Will follow for now

## 2017-03-09 NOTE — PLAN OF CARE
Problem: Patient Care Overview  Goal: Plan of Care Review  Outcome: Ongoing (interventions implemented as appropriate)  POC reviewed with pt and family at 1400. Pt verbalized understanding. Questions and concerns addressed. NCC team discontinued 3%. Patient turned per protocol. Will continue to monitor. See flowsheets for full assessment and VS info.

## 2017-03-09 NOTE — PT/OT/SLP PROGRESS
"Speech Language Pathology  Treatment    Zena Aguirre   MRN: [id number]   Admitting Diagnosis: Acute ischemic left MCA stroke    Diet recommendations: Solid Diet Level: NPO  Liquid Diet Level: NPO cont alternative means of nutrition/hydration via NG    SLP Treatment Date: 17  Speech Start Time: 1115     Speech Stop Time: 1140     Speech Total (min): 25 min       TREATMENT BILLABLE MINUTES:  Speech Therapy Individual 15 and Treatment Swallowing Dysfunction 10    Has the patient been evaluated by SLP for swallowing? : Yes  Keep patient NPO?: Yes   General Precautions: Standard, aphasia, aspiration, fall, NPO  Current Respiratory Status: other (comment) (room air)       Subjective:  "She's still not talking."  Pt's spouse stated    Pain Ratin/10              Pain Rating Post-Intervention: 0/10    Objective:   Patient found with: blood pressure cuff, arterial line, sesay catheter, NG tube, pressure relief boots, peripheral IV, pulse ox (continuous), telemetry    Pt was seated upright for optimal swallowing safety w/ RN assistance.  She was provided oral care w/ moistened oral swabs to lips, teeth, gums and tongue.  She was able to initiate spontaneous swallows in response to stimulation.  She was offered and accepted limited po trials of thin liquids by tsp w/ small single ice chips x2 and 1/2 tsp sips x3.  She demonstrated decreased opening of oral cavity w/ spoon presentation but adequate oral bolus manipulation and control.  Minor anterior spillage was seen d/t decreased labial seal w/ spoon.  Swallow reflex initiation appeared timely w/ adequate hyolaryngeal elevation.  No overt s/s of aspiration were seen, however, pt was unable to produce voicing for further assessment of the upper airway.  Silent aspiration cannot be ruled out.  Additional trials were deferred.    Pt was provided max cues and stimulation for voicing trials w/ voicing elicited x1 spontaneously.  Pt answered no yes/no questions via any " modality.  She followed no directives despite max cues.  Education was provided to pt and family re: SLP role and poc. Pt's family indicated good understanding.    FIM:                                 Assessment:  Zena Aguirre is a 81 y.o. female with a medical diagnosis of Acute ischemic left MCA stroke and presents with dysphagia, aphasia, apraxia, and dysarthria.    Discharge recommendations: Discharge Facility/Level Of Care Needs: nursing facility, skilled     Goals:   SLP Goals        Problem: SLP Goal    Goal Priority Disciplines Outcome   SLP Goal     SLP Ongoing (interventions implemented as appropriate)   Description:  Updated Speech Language Pathology Goals  Goals expected to be met by 3/22  1. Pt will participate in ongoing swallowing assessment to determine if safe to begin PO intake.  2. Pt will vocalize x1 during session.  3. Pt will follow simple, one step commands given max cues with 70% acc to improve overall receptive language  4. Pt will answer simple y/n questions in any modality with 70% acc to improve overall receptive language skills  5. Pt will identify objects in FO2 via eye gaze with 60% acc to improve overall receptive language skills      Speech Language Pathology Goals  Goals expected to be met by 3/14:  1. Pt will participate in ongoing swallowing assessment to determine if safe to begin PO intake.  2. Pt will participate in speech/language evaluation to determine further goals.                     Plan:   Patient to be seen Therapy Frequency: 5 x/week   Plan of Care expires: 04/05/17  Plan of Care reviewed with: patient, family, spouse, son  SLP Follow-up?: Yes              Ann Brantley CCC-SLP  03/09/2017

## 2017-03-09 NOTE — PROGRESS NOTES
Patient SBP remains elevated. Intermittent tremors noted. Per  patient takes propanolol @ home for tremors. Notified JOSE Fan. PA gave verbal order with read-back for 10 mg hydralazine IVP Q4H PRN SBP > 180. PA will pass on info about tremors to team. Orders placed. Will continue to monitor.

## 2017-03-09 NOTE — PROGRESS NOTES
[facility]  Vascular Neurology  Comprehensive Stroke Center  Progress Note      Neurologic Chief Complaint: L MCA stroke     Subjective:     Interval History: Patient is seen for follow-up neurological assessment and treatment recommendations: concern for increased swelling, neurosurgery consulted    HPI, Past Medical, Family, and Social History remains the same as documented in the initial encounter.     Review of Systems   Constitutional: Positive for fever.   HENT:        Ng present    Neurological: Positive for facial asymmetry, speech difficulty and weakness.     Scheduled Meds:   [START ON 3/9/2017] atorvastatin  40 mg Per NG tube Daily    [START ON 3/9/2017] B-complex with vitamin C  1 tablet Per NG tube Daily    calcium carbonate  500 mg Per NG tube BID WM    diltiaZEM  30 mg Per NG tube Q8H    [START ON 3/9/2017] hydrochlorothiazide  25 mg Per NG tube Daily    levothyroxine  25 mcg Per NG tube Before breakfast    senna-docusate 8.6-50 mg  1 tablet Per NG tube BID    sodium chloride 0.9%  10 mL Intravenous Q6H    sodium chloride 0.9%  3 mL Intravenous Q8H    sodium chloride 3%  250 mL Intravenous Once    [START ON 3/9/2017] vitamin D  1,000 Units Per NG tube Daily     Continuous Infusions:   sodium chloride 3% 75 mL/hr (03/08/17 1805)     PRN Meds:flu vacc [id number] 65yr up(PF), labetalol, magnesium sulfate IVPB, magnesium sulfate IVPB, pneumoc 13-evette conj-dip cr(PF), potassium chloride **AND** potassium chloride **AND** potassium chloride, Flushing PICC Protocol **AND** sodium chloride 0.9% **AND** sodium chloride 0.9%, sodium phosphate IVPB, sodium phosphate IVPB, sodium phosphate IVPB    Objective:     Vital Signs (Most Recent):  Temp: (!) 100.5 °F (38.1 °C) (03/08/17 1800)  Pulse: 90 (03/08/17 1800)  Resp: 17 (03/08/17 1800)  BP: (!) 179/73 (03/08/17 1645)  SpO2: 95 % (03/08/17 1800)  BP Location: Right arm    Vital Signs Range (Last 24H):  Temp:  [97.7 °F (36.5 °C)-100.5  °F (38.1 °C)]   Pulse:  [66-93]   Resp:  [14-25]   BP: (141-223)/()   SpO2:  [93 %-96 %]   Arterial Line BP: (165-184)/(63-72)   BP Location: Right arm    Physical Exam   Constitutional: She appears well-developed and well-nourished.   HENT:   Head: Normocephalic.   Eyes:   Gaze left    Cardiovascular: Normal rate.    Neurological:   Drowsy   Skin: Skin is warm.   Nursing note and vitals reviewed.      Neurological Exam:   LOC: does not follow requests and drowsy  Language: Global aphasia  Speech: Aphasic   Orientation: Aphasic  EOM (CN III, IV, VI): Gaze preference left  Facial Movement (CN VII): lower weakness right lower  Motor*: Arm Left:  Paretic:  4/5, Leg Left:   Paretic:  4/5, Arm Right:   Plegic (0/5), Leg Right:   Plegic (0/5)  Sensation: caesar-hypoesthesia right    NIH Stroke Scale:    Level of Consciousness: 1 - drowsy  LOC Questions: 2 - answers none correctly  LOC Commands: 2 - performs neither correctly  Best Gaze: 1 - partial gaze palsy  Visual: 2 - complete hemianopia  Facial Palsy: 1 - minor  Motor Left Arm: 1 - drift  Motor Right Arm: 4 - no movement  Motor Left Le - no drift  Motor Right Le - no movement  Limb Ataxia: 0 - absent  Sensory: 1 - mild to moderate loss  Best Language: 3 - mute  Dysarthria: 0 - normal articulation  Extinction and Inattention: 0 - no neglect  NIH Stroke Scale Total: 22      Laboratory:  CMP:   Recent Labs  Lab 17  1711   CALCIUM 8.1*  --   --    ALBUMIN 2.9*  --   --    PROT 6.5  --   --    *  < > 135*   K 3.3*  --  3.6   CO2 22*  --   --      --   --    BUN 15  --   --    CREATININE 0.8  --   --    ALKPHOS 42*  --   --    ALT 23  --   --    AST 49*  --   --    BILITOT 1.0  --   --    < > = values in this interval not displayed.  CBC:   Recent Labs  Lab 17   WBC 11.30   RBC 3.36*   HGB 10.8*   HCT 32.8*      MCV 98   MCH 32.1*   MCHC 32.9     Lipid Panel:   Recent Labs  Lab 17  0527   CHOL 120    LDLCALC 68.2   HDL 40   TRIG 59     Coagulation:   Recent Labs  Lab 03/07/17  0749   INR 1.1   APTT 21.2     Platelet Aggregation Study: No results for input(s): PLTAGG, PLTAGINTERP, PLTAGREGLACO, ADPPLTAGGREG in the last 168 hours.  Hgb A1C:   Recent Labs  Lab 03/07/17  0527   HGBA1C 5.9     TSH:   Recent Labs  Lab 03/07/17  0527   TSH 0.109*       Diagnostic Results:  I have personally reviewed  CT head 3/7/17  Temporal evolution of large left MCA territory  acute infarction with mass effect on the underlying parenchyma, sulci and left lateral ventricle with a 0.3 cm rightward midline shift.  No evidence of hydrocephalus or intracranial hemorrhage in this patient status post tPA.  Continued followup is recommended.     Echo 3/7/17  Moderately enlarged LA  CONCLUSIONS     1 - Concentric remodeling.     2 - Normal left ventricular systolic function (EF 60-65%).     3 - Right ventricular enlargement with moderately depressed systolic function.     4 - Left ventricular diastolic dysfunction.     5 - Biatrial enlargement.     6 - Mild aortic stenosis, MATEO = 1.6 cm2, peak velocity = 2.0 m/s, mean gradient = 10 mmHg.     7 - Severe tricuspid regurgitation.     8 - Pulmonary hypertension. The estimated PA systolic pressure is 102 mmHg.     9 - Increased central venous pressure.          Assessment/Plan:     81 year old female with L MCA stroke with right hemiparesis, and aphasia. History of afib. Received tpa on 3/6/17  Discussion with NCC team regarding goals of care noted.         * Acute ischemic left MCA stroke  Likely cardioembolic in etiology, given recent Dx of afib not on anticoagulation.    Antithrombotics for secondary stroke prevention: can start asa , can hold if considering neurosurgical intervention, consulted today     Statins for secondary stroke prevention and hyperlipidemia, if present: Atorvastatin- 40 mg oral daily.     Aggressive risk factor modification: Hypertension, High Cholesterol, Diet,  Exercise and Obesity, Rehab Efforts: Physical Therapy, Occupational Therapy, Speech and Language Pathology and Physical Medicine and Rehabilitation    Diagnostics: Ordered/Pending HgbA1C to assess blood glucose levels, MRI head without contrast to assess brain parenchyma, TSH to assess thyroid function    VTE Prophylaxis: Hold x24h s/p tPA, BP Parameters: SBP <180. Can continue to hold if considering neurosurgical intervention     Nursing Orders: Neuro checks- q4h, Antiembolic stockings, Swallowing evaluation by Nursing, Out of bed BID, Avoid Mace catheter, Pneumatic compression device, Stroke Education, Blood glucose target 100-130, Up with assistance and IV Fluids: Per primary team    Hypothyroidism  Stroke RF  Synthroid    Essential hypertension  Stroke RF  SBP goal <180  Management per primary team    Paroxysmal atrial fibrillation  Stroke RF.  Likely etiology of patient's AIS.  Plan to start AC this admission.  Hold for now, given tPA and potential size of stroke.    Hyperlipidemia  Stroke risk factor  Goal LDL <70  LDL -68  Atorvastatin 40mg daily    Cytotoxic cerebral edema  As seen on imaging from Acute ischemic stroke - L MCA       JOSE Gerber  Comprehensive Stroke Center  Department of Vascular Neurology   [facility]

## 2017-03-09 NOTE — SIGNIFICANT EVENT
The patient is unable to participate due to Aphasia     I met with Ms. Aguirre's family, including , 2 daughters, son, bother in law, sister in law and 2 granddaughters    This meeting included discussion of the diagnosis, prognosis, and goals of care, was absolutely necessary for treatment decisions, and bore directly on the management of the patient.    I explained to them the diagnosis and prognosis. I explained to them that the suspicion that she is going to survive this stroke but she will have moderate to sever neurological deficit in the form of aphasia and right side weakness. i also explained to them that most likely she will need a feeding tube. The family verbalized understanding and I answered their questions and concerns. They are waiting for the other son who is coming from [facility]. The family decided to make her DNR/DNI. Will continue with maximum medical management and plan for another family meeting early next week.     Uninterrupted Critical Care/Counseling Time (not including procedures): 70 minutes exclusive of the previously documented critical care time.

## 2017-03-09 NOTE — PLAN OF CARE
Problem: Patient Care Overview  Goal: Plan of Care Review  Outcome: Ongoing (interventions implemented as appropriate)  POC reviewed with pt and family at 1600. Pt unable to verbalize an understanding, pt's  verbalizes an understanding. Questions and concerns addressed. Neuro remains unchanged, pt follows simple commands intermittently, pt moves left side upper and lower extremities spontaneously, and right upper and lower extremities withdraws to pain, pt received 1 time dose 60g iv mannitol for increase swelling on CT per NCC, serum sodium 128, pt receive 200ml bolus of 3 % nacl over 1 hr, and 3 % increased to 75ml/hr, serum sodium increased to 135, pt has low grade temp, acetaminophen administered prn, blood glucose monitored, skin remains intact, chang inserted for close monitoring of BP, electrolytes replaced, Pt progressing toward goals. Will continue to monitor closely. See flowsheets for full assessment and VS info.

## 2017-03-09 NOTE — PT/OT/SLP PROGRESS
Physical Therapy      Zena Aguirre  MRN: [id number]    Patient not seen today secondary to Other (Comment) (Family discussing goals of care, PT to hold this date until family meeting occurs.). Will follow-up as POC allows.    Josee Cain PT, DPT  3/9/2017  [phone]

## 2017-03-09 NOTE — PLAN OF CARE
03/09/17 1217   Right Care Assessment   Can the patient answer the patient profile reliably? No, cognitively impaired   How often would a person be available to care for the patient? Whenever needed   Describe the patient's ability to walk at the present time. Minor restrictions or changes   How does the patient rate their overall health at the present time? (anni)   Number of comorbid conditions (as recorded on the chart) Two   During the past month, has the patient often been bothered by feeling down, depressed or hopeless? (anni)   During the past month, has the patient often been bothered by little interest or pleasure in doing things? (anni)       Plan A:  SNF    Plan B:  LORELEI Gates RN, CCRN-K, [facility]  Neuro-Critical Care   [phone] [id number]

## 2017-03-09 NOTE — SUBJECTIVE & OBJECTIVE
Neurologic Chief Complaint: L MCA stroke     Subjective:     Interval History: Patient is seen for follow-up neurological assessment and treatment recommendations: concern for increased swelling, neurosurgery consulted    HPI, Past Medical, Family, and Social History remains the same as documented in the initial encounter.     Review of Systems   Constitutional: Positive for fever.   HENT:        Ng present    Neurological: Positive for facial asymmetry, speech difficulty and weakness.     Scheduled Meds:   [START ON 3/9/2017] atorvastatin  40 mg Per NG tube Daily    [START ON 3/9/2017] B-complex with vitamin C  1 tablet Per NG tube Daily    calcium carbonate  500 mg Per NG tube BID WM    diltiaZEM  30 mg Per NG tube Q8H    [START ON 3/9/2017] hydrochlorothiazide  25 mg Per NG tube Daily    levothyroxine  25 mcg Per NG tube Before breakfast    senna-docusate 8.6-50 mg  1 tablet Per NG tube BID    sodium chloride 0.9%  10 mL Intravenous Q6H    sodium chloride 0.9%  3 mL Intravenous Q8H    sodium chloride 3%  250 mL Intravenous Once    [START ON 3/9/2017] vitamin D  1,000 Units Per NG tube Daily     Continuous Infusions:   sodium chloride 3% 75 mL/hr (03/08/17 1805)     PRN Meds:flu vacc [id number] 65yr up(PF), labetalol, magnesium sulfate IVPB, magnesium sulfate IVPB, pneumoc 13-evette conj-dip cr(PF), potassium chloride **AND** potassium chloride **AND** potassium chloride, Flushing PICC Protocol **AND** sodium chloride 0.9% **AND** sodium chloride 0.9%, sodium phosphate IVPB, sodium phosphate IVPB, sodium phosphate IVPB    Objective:     Vital Signs (Most Recent):  Temp: (!) 100.5 °F (38.1 °C) (03/08/17 1800)  Pulse: 90 (03/08/17 1800)  Resp: 17 (03/08/17 1800)  BP: (!) 179/73 (03/08/17 1645)  SpO2: 95 % (03/08/17 1800)  BP Location: Right arm    Vital Signs Range (Last 24H):  Temp:  [97.7 °F (36.5 °C)-100.5 °F (38.1 °C)]   Pulse:  [66-93]   Resp:  [14-25]   BP: (141-223)/()   SpO2:  [93 %-96 %]    Arterial Line BP: (165-184)/(63-72)   BP Location: Right arm    Physical Exam   Constitutional: She appears well-developed and well-nourished.   HENT:   Head: Normocephalic.   Eyes:   Gaze left    Cardiovascular: Normal rate.    Neurological:   Drowsy   Skin: Skin is warm.   Nursing note and vitals reviewed.      Neurological Exam:   LOC: does not follow requests and drowsy  Language: Global aphasia  Speech: Aphasic   Orientation: Aphasic  EOM (CN III, IV, VI): Gaze preference left  Facial Movement (CN VII): lower weakness right lower  Motor*: Arm Left:  Paretic:  4/5, Leg Left:   Paretic:  4/5, Arm Right:   Plegic (0/5), Leg Right:   Plegic (0/5)  Sensation: caesar-hypoesthesia right    NIH Stroke Scale:    Level of Consciousness: 1 - drowsy  LOC Questions: 2 - answers none correctly  LOC Commands: 2 - performs neither correctly  Best Gaze: 1 - partial gaze palsy  Visual: 2 - complete hemianopia  Facial Palsy: 1 - minor  Motor Left Arm: 1 - drift  Motor Right Arm: 4 - no movement  Motor Left Le - no drift  Motor Right Le - no movement  Limb Ataxia: 0 - absent  Sensory: 1 - mild to moderate loss  Best Language: 3 - mute  Dysarthria: 0 - normal articulation  Extinction and Inattention: 0 - no neglect  NIH Stroke Scale Total: 22      Laboratory:  CMP:   Recent Labs  Lab 17  1711   CALCIUM 8.1*  --   --    ALBUMIN 2.9*  --   --    PROT 6.5  --   --    *  < > 135*   K 3.3*  --  3.6   CO2 22*  --   --      --   --    BUN 15  --   --    CREATININE 0.8  --   --    ALKPHOS 42*  --   --    ALT 23  --   --    AST 49*  --   --    BILITOT 1.0  --   --    < > = values in this interval not displayed.  CBC:   Recent Labs  Lab 17   WBC 11.30   RBC 3.36*   HGB 10.8*   HCT 32.8*      MCV 98   MCH 32.1*   MCHC 32.9     Lipid Panel:   Recent Labs  Lab 17  0527   CHOL 120   LDLCALC 68.2   HDL 40   TRIG 59     Coagulation:   Recent Labs  Lab 17  0749   INR 1.1    APTT 21.2     Platelet Aggregation Study: No results for input(s): PLTAGG, PLTAGINTERP, PLTAGREGLACO, ADPPLTAGGREG in the last 168 hours.  Hgb A1C:   Recent Labs  Lab 03/07/17 0527   HGBA1C 5.9     TSH:   Recent Labs  Lab 03/07/17 0527   TSH 0.109*       Diagnostic Results:  I have personally reviewed  CT head 3/7/17  Temporal evolution of large left MCA territory  acute infarction with mass effect on the underlying parenchyma, sulci and left lateral ventricle with a 0.3 cm rightward midline shift.  No evidence of hydrocephalus or intracranial hemorrhage in this patient status post tPA.  Continued followup is recommended.     Echo 3/7/17  Moderately enlarged LA  CONCLUSIONS     1 - Concentric remodeling.     2 - Normal left ventricular systolic function (EF 60-65%).     3 - Right ventricular enlargement with moderately depressed systolic function.     4 - Left ventricular diastolic dysfunction.     5 - Biatrial enlargement.     6 - Mild aortic stenosis, MATEO = 1.6 cm2, peak velocity = 2.0 m/s, mean gradient = 10 mmHg.     7 - Severe tricuspid regurgitation.     8 - Pulmonary hypertension. The estimated PA systolic pressure is 102 mmHg.     9 - Increased central venous pressure.

## 2017-03-09 NOTE — ASSESSMENT & PLAN NOTE
Likely cardioembolic in etiology, given recent Dx of afib not on anticoagulation.    Antithrombotics for secondary stroke prevention: can start asa , can hold if considering neurosurgical intervention, consulted today     Statins for secondary stroke prevention and hyperlipidemia, if present: Atorvastatin- 40 mg oral daily.     Aggressive risk factor modification: Hypertension, High Cholesterol, Diet, Exercise and Obesity, Rehab Efforts: Physical Therapy, Occupational Therapy, Speech and Language Pathology and Physical Medicine and Rehabilitation    Diagnostics: Ordered/Pending HgbA1C to assess blood glucose levels, MRI head without contrast to assess brain parenchyma, TSH to assess thyroid function    VTE Prophylaxis: Hold x24h s/p tPA, BP Parameters: SBP <180. Can continue to hold if considering neurosurgical intervention     Nursing Orders: Neuro checks- q4h, Antiembolic stockings, Swallowing evaluation by Nursing, Out of bed BID, Avoid Mace catheter, Pneumatic compression device, Stroke Education, Blood glucose target 100-130, Up with assistance and IV Fluids: Per primary team

## 2017-03-09 NOTE — ASSESSMENT & PLAN NOTE
Stroke RF.  Likely etiology of patient's AIS.  Plan to start AC this admission.  Hold for now, given tPA and potential size of stroke.

## 2017-03-09 NOTE — PROGRESS NOTES
ICU Progress Note  Neurocritical Care    Admit Date: 3/7/2017  LOS: 1    CC: Acute ischemic left MCA stroke    Code Status: Partial Code     SUBJECTIVE:     Interval History/Significant Events: Patient's repeat post-tPA CTH revealed large L MCA territory ischemic stroke with already large amount of edema and midline shift. Hypertonic saline was initiated overnight. Patient's exam improved from yesterday, more alert. Neurosurgery consulted to evaluate role of surgical intervention. Discussion with patient's  and daughter at the bedside revealed that Ms. Aguirre would not have wanted to be intubated. Per family, partial code with DNI, but OK for CPR, pressors, and cardioversion.     HPI: Ms. Aguirre is a 80 y/o female with PMH significant for newly diagnosed atrial fibrillation (not on anticoagulation), hypothyroid, HTN, HLD who presented to our unit s/p tPA for L MCA stroke.     Review of Symptoms:   JUAN CARLOS 2/2 encephalopathy     Medications:  Continuous Infusions:   sodium chloride 3% 75 mL/hr (03/08/17 1805)     Scheduled Meds:   [START ON 3/9/2017] atorvastatin  40 mg Per NG tube Daily    [START ON 3/9/2017] B-complex with vitamin C  1 tablet Per NG tube Daily    calcium carbonate  500 mg Per NG tube BID WM    diltiaZEM  30 mg Per NG tube Q8H    [START ON 3/9/2017] hydrochlorothiazide  25 mg Per NG tube Daily    levothyroxine  25 mcg Per NG tube Before breakfast    senna-docusate 8.6-50 mg  1 tablet Per NG tube BID    sodium chloride 0.9%  10 mL Intravenous Q6H    sodium chloride 0.9%  3 mL Intravenous Q8H    sodium chloride 3%  250 mL Intravenous Once    [START ON 3/9/2017] vitamin D  1,000 Units Per NG tube Daily     PRN Meds:.acetaminophen, flu vacc [id number] 65yr up(PF), labetalol, magnesium sulfate IVPB, magnesium sulfate IVPB, pneumoc 13-evette conj-dip cr(PF), potassium chloride **AND** potassium chloride **AND** potassium chloride, Flushing PICC Protocol **AND** sodium chloride 0.9% **AND**  sodium chloride 0.9%, sodium phosphate IVPB, sodium phosphate IVPB, sodium phosphate IVPB    OBJECTIVE:   Vital Signs (Most Recent):   Temp: (!) 100.5 °F (38.1 °C) (03/08/17 1800)  Pulse: 90 (03/08/17 1800)  Resp: 17 (03/08/17 1800)  BP: (!) 179/73 (03/08/17 1645)  SpO2: 95 % (03/08/17 1800)    Vital Signs (24h Range):   Temp:  [97.7 °F (36.5 °C)-100.5 °F (38.1 °C)] 100.5 °F (38.1 °C)  Pulse:  [66-93] 90  Resp:  [14-25] 17  SpO2:  [93 %-96 %] 95 %  BP: (141-223)/() 179/73  Arterial Line BP: (165-184)/(63-72) 179/72    ICP/CPP (Last 24h):        I & O (Last 24h):   Intake/Output Summary (Last 24 hours) at 03/08/17 1835  Last data filed at 03/08/17 1805   Gross per 24 hour   Intake          2023.75 ml   Output             3375 ml   Net         -1351.25 ml     Physical Exam:  GA: Eyes open, comfortable, NAD.   HEENT: No scleral icterus or JVD.   Pulmonary: Clear to auscultation A/P/L. No wheezing, crackles, or rhonchi.  Cardiac: RRR S1 & S2 w/o rubs/murmurs/gallops.   Abdominal: Bowel sounds present x 4. No appreciable hepatosplenomegaly.  Skin: No jaundice, rashes, or visible lesions.  Neuro:  --GCS: E4 V1 M5  --Mental Status:  Eyes open, does not follow commands, tracks.   --R facial droop   --Pupils 3mm, PERRL.   --Moves L side spontaneously, R side withdraws to pain    Lines/Drains/Airway:          PICC Double Lumen 03/08/17 1208 left brachial (Active)   Site Assessment Clean;Dry;Intact;No redness;No swelling 3/8/2017  3:05 PM   Lumen 1 Status Blood return noted;Flushed;Infusing 3/8/2017  3:05 PM   Lumen 2 Status Flushed;Blood return noted;Infusing 3/8/2017  3:05 PM   Length verónica (cm) 36 cm 3/8/2017 12:05 PM   Current Exposed Catheter (cm) 0 cm 3/8/2017 12:05 PM   Extremity Circumference (cm) 25 cm 3/8/2017 12:05 PM   Dressing Type Transparent 3/8/2017  3:05 PM   Dressing Status Biopatch in place;Clean;Dry;Intact 3/8/2017  3:05 PM   Dressing Intervention New dressing 3/8/2017  3:05 PM   Dressing Change Due  "03/15/17 3/8/2017  3:05 PM   Daily Line Review Performed 3/8/2017  3:05 PM           Arterial Line 03/08/17 1530 Right Radial (Active)   Site Assessment Clean;Dry;Intact;No redness;No swelling 3/8/2017  3:30 PM   Line Status Pulsatile blood flow 3/8/2017  3:30 PM   Art Line Waveform Appropriate;Square wave test performed 3/8/2017  3:30 PM   Arterial Line Interventions Zeroed and calibrated;Leveled;Tubing changed;Connections checked and tightened;Flushed per protocol;Line pulled back;Transducer changed 3/8/2017  3:30 PM   Color/Movement/Sensation Capillary refill less than 3 sec 3/8/2017  3:30 PM   Dressing Type Transparent 3/8/2017  3:30 PM   Dressing Status Biopatch in place;Clean;Dry;Intact 3/8/2017  3:30 PM   Dressing Intervention New dressing 3/8/2017  3:30 PM   Dressing Change Due 03/15/17 3/8/2017  3:30 PM           NG/OG Tube 03/08/17 0615 Hemphill sump 14 Fr. Right nostril (Active)   Placement Check placement verified by x-ray;placement verified by aspirate characteristics 3/8/2017  3:05 PM   Clamp Status/Tolerance clamped;no abdominal discomfort;no abdominal distention;no emesis;no nausea;no residual;no restlessness 3/8/2017  3:05 PM   Flush/Irrigation flushed w/;water;no resistance met 3/8/2017  3:05 PM   Intake (mL) 25 mL 3/8/2017  1:05 PM            Urethral Catheter 03/06/17 (Active)   Site Assessment Clean;Dry 3/8/2017  3:05 PM   Collection Container Urimeter 3/8/2017  3:05 PM   Securement Method secured to top of thigh w/ adhesive device 3/8/2017  3:05 PM   Catheter Care Performed yes 3/8/2017  3:05 PM   Reason for Continuing Urinary Catheterization Critically ill in ICU requiring intensive monitoring 3/8/2017  3:05 PM   CAUTI Prevention Bundle StatLock in place w 1" slack;Intact seal between catheter & drainage tubing;Drainage bag off the floor;Green sheeting clip in use;No dependent loops or kinks;Drainage bag not overfilled (<2/3 full);Drainage bag below bladder 3/8/2017  7:05 AM   Output (mL) 210 mL " 3/8/2017  6:05 PM     Nutrition/Tube Feeds (if NPO state why): NPO for possible surgical intervention/high risk of aspiration      Labs:  ABG:   Recent Labs  Lab 03/08/17  1537   PH 7.465*   PO2 69*   PCO2 25.9*   HCO3 18.7*   POCSATURATED 95   BE -5     BMP:  Recent Labs  Lab 03/08/17  0215  03/08/17  1711   *  < > 135*   K 3.3*  --  3.6     --   --    CO2 22*  --   --    BUN 15  --   --    CREATININE 0.8  --   --    GLU 94  --   --    MG 1.7  --  1.8   PHOS 2.4*  --  1.9*   < > = values in this interval not displayed.  LFT: Lab Results   Component Value Date    AST 49 (H) 03/08/2017    ALT 23 03/08/2017    ALKPHOS 42 (L) 03/08/2017    BILITOT 1.0 03/08/2017    ALBUMIN 2.9 (L) 03/08/2017    PROT 6.5 03/08/2017     CBC:   Lab Results   Component Value Date    WBC 11.30 03/08/2017    HGB 10.8 (L) 03/08/2017    HCT 32.8 (L) 03/08/2017    MCV 98 03/08/2017     03/08/2017     Microbiology x 7d:   Microbiology Results (last 7 days)     ** No results found for the last 168 hours. **        Imaging:  I personally reviewed.    ASSESSMENT/PLAN:     Active Hospital Problems    Diagnosis    *Acute ischemic left MCA stroke    Cytotoxic cerebral edema    Right flaccid hemiplegia    Pleural effusion, bilateral    Pulmonary hypertension    Hypothyroidism    Essential hypertension    Paroxysmal atrial fibrillation    Hyperlipidemia      Neuro:   Large L MCA ischemic stroke s/p tPA now with cytotoxic cerebral edema and MLS  -- q1h neuro checks  -- HTS, goal Na 140-145, place PICC and bolus 3% once in place to drive serum Na  -- Mannitol 60g x1 now   -- Pupillometer monitoring q2h  -- Continue atorvastatin  -- Consult NSGY for possible intervention   -- Vascular neurology following  -- Holding ASA and SQH at this time given high risk of hemorrhagic conversion of large territory stroke and possible surgical intervention        Pulmonary:   -- Home HCTZ started yesterday given finding of bilateral pleural  effusions on CXR yesterday, O2 requirements decreased today, tolerating RA  -- CXR this AM improved  -- Pulmonary hypertension present on echo PA pressure 102  -- Place arterial line and q4h ABGs      Cardiac:   Atrial fibrillation, HTN  -- Diltiazem 30 mg q8h   -- SBP goal 100-180      Renal:    -- 3% saline infusion for goal serum Na 140-145  -- BUN, Cr wnl  -- Baseline Na 138      ID:   -- Afebrile  -- No leukocytosis      Hem/Onc:   -- H&H stable  -- Platelets, coags wnl      Endocrine:    -- Euglycemic     Hypothyroidism  -- Restart home levothyroxine      Fluids/Electrolytes/Nutrition/GI:   -- Place NGT   -- Continue to hold TF  -- PRN lyte replacement     Lines:  Art-- place today  PICC place today   Mace 2d    Proph:  DVT: SCDs, holding chemoprophylaxis in setting of large territory stroke and NSGY evaluation   Constipation: Senna-docusate Last output: None since admit   PUP: low risk  VAP: N/A      Uninterrupted Critical Care/Counseling Time (not including procedures): 35 minutes     Elana Holt PA-C  Neuro Critical Care  [id number]

## 2017-03-09 NOTE — PROGRESS NOTES
Patient's pupils unequal R > L, both brisk. Notified JOSE Fan. No new orders. Will continue to monitor.

## 2017-03-09 NOTE — PLAN OF CARE
Problem: Patient Care Overview  Goal: Plan of Care Review  Outcome: Ongoing (interventions implemented as appropriate)  POC reviewed with pt and family at 0500. Pt unable to verbalize understanding at this time. Questions and concerns addressed with family at bedside. No acute events overnight. Pt progressing toward goals. Will continue to monitor. See flowsheets for full assessment and VS info

## 2017-03-09 NOTE — PT/OT/SLP PROGRESS
Occupational Therapy      Zena Aguirre  MRN: [id number]    Patient not seen today secondary to RN reported HOLD on this date.     VERONA Dhillon  3/9/2017

## 2017-03-09 NOTE — PLAN OF CARE
Problem: SLP Goal  Goal: SLP Goal  Updated Speech Language Pathology Goals  Goals expected to be met by 3/22  1. Pt will participate in ongoing swallowing assessment to determine if safe to begin PO intake.  2. Pt will vocalize x1 during session.  3. Pt will follow simple, one step commands given max cues with 70% acc to improve overall receptive language  4. Pt will answer simple y/n questions in any modality with 70% acc to improve overall receptive language skills  5. Pt will identify objects in FO2 via eye gaze with 60% acc to improve overall receptive language skills      Speech Language Pathology Goals  Goals expected to be met by 3/14:  1. Pt will participate in ongoing swallowing assessment to determine if safe to begin PO intake.  2. Pt will participate in speech/language evaluation to determine further goals.      Outcome: Ongoing (interventions implemented as appropriate)  Pt participated w/ tx tasks.  Goals remain appropriate.  Cont ST per POC.     Ann Brantley CCC-SLP  3/9/2017

## 2017-03-09 NOTE — PROGRESS NOTES
ICU Progress Note  Neurocritical Care    Admit Date: 3/7/2017  LOS: 2    CC: Acute ischemic left MCA stroke    Code Status: Partial Code     SUBJECTIVE:     Interval History/Significant Events: Acute embolic L  M1 occlusion and L MCA terriotry  infarct,  admitted on 3/7 after receiving IV tPa, no thrombectomy due to CT  changes on early imaging.   -- Repeat imaging at 24 hours revealed enlarged area of cytotoxic edema with mass effect and  rightward MLS of .0.3 cm- hypertonic saline initiated as well as 1 push of mannitol . Holding ASA and SQH currently  due to size of infarct with concern for hemorrhagic conversion vs surgical intervention. NSx aware of patient.     Overnight no acute events. Neuroligic exam without decline. Continuing 3% at 75. Most recent sodium 149. Had large amount of urine output x 2 this AM, following up repeat sodium.         Review of Symptoms:   Nanci due to aphasia    Medications:  Continuous Infusions:   sodium chloride 3% 75 mL/hr (03/09/17 0800)     Scheduled Meds:   atorvastatin  40 mg Per NG tube Daily    B-complex with vitamin C  1 tablet Per NG tube Daily    calcium carbonate  500 mg Per NG tube BID WM    diltiaZEM  30 mg Per NG tube Q8H    hydrochlorothiazide  25 mg Per NG tube Daily    levothyroxine  25 mcg Per NG tube Before breakfast    senna-docusate 8.6-50 mg  1 tablet Per NG tube BID    sodium chloride 0.9%  10 mL Intravenous Q6H    sodium chloride 0.9%  3 mL Intravenous Q8H    sodium chloride 3%  250 mL Intravenous Once    vitamin D  1,000 Units Per NG tube Daily     PRN Meds:.acetaminophen, hydrALAZINE, flu vacc [id number] 65yr up(PF), labetalol, magnesium sulfate IVPB, magnesium sulfate IVPB, pneumoc 13-evette conj-dip cr(PF), potassium chloride **AND** potassium chloride **AND** potassium chloride, Flushing PICC Protocol **AND** sodium chloride 0.9% **AND** sodium chloride 0.9%, sodium phosphate IVPB, sodium phosphate IVPB, sodium phosphate IVPB    OBJECTIVE:    Vital Signs (Most Recent):   Temp: 98.8 °F (37.1 °C) (03/09/17 0705)  Pulse: 85 (03/09/17 0800)  Resp: (!) 24 (03/09/17 0800)  BP: (!) 159/74 (03/09/17 0800)  SpO2: 96 % (03/09/17 0800)    Vital Signs (24h Range):   Temp:  [98.6 °F (37 °C)-100.7 °F (38.2 °C)] 98.8 °F (37.1 °C)  Pulse:  [72-94] 85  Resp:  [14-25] 24  SpO2:  [94 %-97 %] 96 %  BP: (146-214)/() 159/74  Arterial Line BP: (152-187)/(56-81) 169/68    ICP/CPP (Last 24h):        I & O (Last 24h):   Intake/Output Summary (Last 24 hours) at 03/09/17 0832  Last data filed at 03/09/17 0800   Gross per 24 hour   Intake           2377.5 ml   Output             3425 ml   Net          -1047.5 ml     Physical Exam:  GA: Alert, comfortable, no acute distress.   HEENT: No scleral icterus or JVD.   Pulmonary: bibasilar wheezes, tolerating RA   Cardiac: RRR S1 & S2 w/o rubs/murmurs/gallops.   Abdominal: Bowel sounds present x 4. No appreciable hepatosplenomegaly.  Skin: No jaundice, rashes, or visible lesions.  Neuro:  --GCS: E4 V1 M5  --Mental Status: awake, alert, aphasic  --L gaze preference   --Pupils 3 mm, PERRL.   --Corneal reflex, gag, cough intact.  --RUE flexion to pain, RLE withdraws to pain  --moves Left side spontaneously  --does not follow verbal commands       Lines/Drains/Airway:          PICC Double Lumen 03/08/17 1208 left brachial (Active)   Site Assessment Clean;Dry;Intact 3/9/2017  7:05 AM   Lumen 1 Status Infusing 3/9/2017  7:05 AM   Lumen 2 Status Infusing 3/9/2017  7:05 AM   Length verónica (cm) 36 cm 3/8/2017 12:05 PM   Current Exposed Catheter (cm) 0 cm 3/8/2017 12:05 PM   Extremity Circumference (cm) 25 cm 3/8/2017 12:05 PM   Dressing Type Transparent 3/9/2017  7:05 AM   Dressing Status Biopatch in place;Clean;Dry;Intact 3/9/2017  7:05 AM   Dressing Intervention Dressing reinforced 3/9/2017  7:05 AM   Dressing Change Due 03/15/17 3/9/2017  7:05 AM   Daily Line Review Performed 3/9/2017  7:05 AM           Arterial Line 03/08/17 1530 Right  "Radial (Active)   Site Assessment Clean;Dry;Intact 3/9/2017  7:05 AM   Line Status Pulsatile blood flow 3/9/2017  7:05 AM   Art Line Waveform Appropriate;Square wave test performed 3/9/2017  7:05 AM   Arterial Line Interventions Zeroed and calibrated;Leveled;Connections checked and tightened;Flushed per protocol 3/9/2017  7:05 AM   Color/Movement/Sensation Capillary refill less than 3 sec 3/9/2017  7:05 AM   Dressing Type Transparent 3/9/2017  7:05 AM   Dressing Status Biopatch in place;Clean;Dry;Intact 3/9/2017  7:05 AM   Dressing Intervention Dressing reinforced 3/9/2017  7:05 AM   Dressing Change Due 03/15/17 3/9/2017  7:05 AM           NG/OG Tube 03/08/17 0615 [place] sump 14 Fr. Right nostril (Active)   Placement Check placement verified by distal tube length measurement;placement verified by x-ray 3/9/2017  7:05 AM   Distal Tube Length (cm) 63 3/9/2017  7:05 AM   Clamp Status/Tolerance clamped;no abdominal discomfort;no abdominal distention;no emesis;no nausea;no residual;no restlessness 3/9/2017  7:05 AM   Flush/Irrigation flushed w/;water;no resistance met 3/9/2017  3:05 AM   Intake (mL) 100 mL 3/9/2017  8:00 AM   Residual Amount (ml) 0 ml 3/8/2017 11:03 PM            Urethral Catheter 03/06/17 (Active)   Site Assessment Clean;Intact;Dry 3/9/2017  7:05 AM   Collection Container Urimeter 3/9/2017  7:05 AM   Securement Method secured to top of thigh w/ adhesive device 3/9/2017  7:05 AM   Catheter Care Performed yes 3/9/2017  7:05 AM   Reason for Continuing Urinary Catheterization Critically ill in ICU requiring intensive monitoring 3/9/2017  7:05 AM   CAUTI Prevention Bundle StatLock in place w 1" slack;Intact seal between catheter & drainage tubing;Drainage bag off the floor;Green sheeting clip in use;No dependent loops or kinks;Drainage bag not overfilled (<2/3 full);Drainage bag below bladder 3/9/2017  7:05 AM     Labs:  ABG:   Recent Labs  Lab 03/09/17  0423   PH 7.478*   PO2 79*   PCO2 22.3*   HCO3 16.5* "   POCSATURATED 97   BE -7     BMP:  Recent Labs  Lab 03/09/17  0200 03/09/17  0530   * 149*   K 3.9  --    *  --    CO2 20*  --    BUN 15  --    CREATININE 0.8  --    *  --    MG 1.9  --    PHOS 3.1  --      LFT: Lab Results   Component Value Date    AST 45 (H) 03/09/2017    ALT 22 03/09/2017    ALKPHOS 42 (L) 03/09/2017    BILITOT 1.2 (H) 03/09/2017    ALBUMIN 2.8 (L) 03/09/2017    PROT 6.5 03/09/2017     CBC:   Lab Results   Component Value Date    WBC 9.50 03/09/2017    HGB 11.1 (L) 03/09/2017    HCT 32.3 (L) 03/09/2017    MCV 96 03/09/2017     03/09/2017     Microbiology x 7d:   Microbiology Results (last 7 days)     ** No results found for the last 168 hours. **          ASSESSMENT/PLAN:     Patient Active Problem List   Diagnosis    Hypothyroidism    Essential hypertension    Paroxysmal atrial fibrillation    Hyperlipidemia    Acute ischemic left MCA stroke    Cytotoxic cerebral edema    Right flaccid hemiplegia    Pleural effusion, bilateral    Pulmonary hypertension      Neuro:   Acute embolic L M1 occlusion with large MCA territory infarct  --sp tpa ,no mechanical intervention  --worsening edema on CT on 3/8, hypertonic saline initiated , most recent sodium 153, will discontinue today  --neurologic exam stable   --ASA and SQH held until now due to concern for hemorrhagic conversion, will start ASA today and likely sqh tomorrow.   --statin daily   --PT/OT  --will likely need PEG    Pulmonary:   Atelectasis on CXR, wheezes bibasilar  --follow up CXR tomorrow  --monitor for secretions, chest PT if needed  --schedule duonebs    Cardiac:   Pulmonary HTN, Tricuspid regurgitation, newly diagnosed Afib  --euvolemia , rate controlled on diltiazem PO    Renal:    Iatrogenic hypernatremia  --153, will discontinue 3%  --BUN Cr wnl  --urine output monitoring    ID:   Afebrile, no leukocytosis    Hem/Onc:   H/h, plt wnl, stable  sqh  Held until tomorrow.     Endocrine:     SSI    Fluids/Electrolytes/Nutrition/GI:   isosource    Uninterrupted Critical Care/Counseling Time (not including procedures): 48 minutes        Cele Huang PA-C  Neurocritical Care  [facility]  Pager/Metafusedink #[phone]

## 2017-03-10 PROBLEM — G93.5 BRAIN COMPRESSION: Status: ACTIVE | Noted: 2017-03-10

## 2017-03-10 PROBLEM — I63.412 EMBOLIC STROKE INVOLVING LEFT MIDDLE CEREBRAL ARTERY: Status: ACTIVE | Noted: 2017-03-07

## 2017-03-10 PROBLEM — I63.412 CEREBROVASCULAR ACCIDENT (CVA) DUE TO EMBOLISM OF LEFT MIDDLE CEREBRAL ARTERY: Status: ACTIVE | Noted: 2017-03-10

## 2017-03-10 LAB
ALBUMIN SERPL BCP-MCNC: 2.7 G/DL
ALLENS TEST: ABNORMAL
ALP SERPL-CCNC: 40 U/L
ALT SERPL W/O P-5'-P-CCNC: 22 U/L
ANION GAP SERPL CALC-SCNC: 7 MMOL/L
AST SERPL-CCNC: 42 U/L
BASOPHILS # BLD AUTO: 0.01 K/UL
BASOPHILS NFR BLD: 0.1 %
BILIRUB SERPL-MCNC: 0.8 MG/DL
BUN SERPL-MCNC: 23 MG/DL
CALCIUM SERPL-MCNC: 8.1 MG/DL
CHLORIDE SERPL-SCNC: 125 MMOL/L
CO2 SERPL-SCNC: 20 MMOL/L
CREAT SERPL-MCNC: 0.8 MG/DL
DELSYS: ABNORMAL
DIFFERENTIAL METHOD: ABNORMAL
EOSINOPHIL # BLD AUTO: 0 K/UL
EOSINOPHIL NFR BLD: 0 %
ERYTHROCYTE [DISTWIDTH] IN BLOOD BY AUTOMATED COUNT: 14.5 %
EST. GFR  (AFRICAN AMERICAN): >60 ML/MIN/1.73 M^2
EST. GFR  (NON AFRICAN AMERICAN): >60 ML/MIN/1.73 M^2
GLUCOSE SERPL-MCNC: 174 MG/DL
HCO3 UR-SCNC: 19.3 MMOL/L (ref 24–28)
HCT VFR BLD AUTO: 32.1 %
HGB BLD-MCNC: 10.4 G/DL
LYMPHOCYTES # BLD AUTO: 1.7 K/UL
LYMPHOCYTES NFR BLD: 15.6 %
MAGNESIUM SERPL-MCNC: 1.8 MG/DL
MCH RBC QN AUTO: 32.8 PG
MCHC RBC AUTO-ENTMCNC: 32.4 %
MCV RBC AUTO: 101 FL
MODE: ABNORMAL
MONOCYTES # BLD AUTO: 1.6 K/UL
MONOCYTES NFR BLD: 15 %
NEUTROPHILS # BLD AUTO: 7.3 K/UL
NEUTROPHILS NFR BLD: 69.3 %
PCO2 BLDA: 27.4 MMHG (ref 35–45)
PH SMN: 7.46 [PH] (ref 7.35–7.45)
PHOSPHATE SERPL-MCNC: 1.3 MG/DL
PHOSPHATE SERPL-MCNC: <1 MG/DL
PLATELET # BLD AUTO: 143 K/UL
PMV BLD AUTO: 10.2 FL
PO2 BLDA: 75 MMHG (ref 80–100)
POC BE: -5 MMOL/L
POC SATURATED O2: 96 % (ref 95–100)
POC TCO2: 20 MMOL/L (ref 23–27)
POCT GLUCOSE: 148 MG/DL (ref 70–110)
POCT GLUCOSE: 167 MG/DL (ref 70–110)
POCT GLUCOSE: 227 MG/DL (ref 70–110)
POTASSIUM SERPL-SCNC: 4.1 MMOL/L
PROT SERPL-MCNC: 6.4 G/DL
RBC # BLD AUTO: 3.17 M/UL
SAMPLE: ABNORMAL
SITE: ABNORMAL
SODIUM SERPL-SCNC: 148 MMOL/L
SODIUM SERPL-SCNC: 149 MMOL/L
SODIUM SERPL-SCNC: 150 MMOL/L
SODIUM SERPL-SCNC: 151 MMOL/L
SODIUM SERPL-SCNC: 152 MMOL/L
SP02: 96
WBC # BLD AUTO: 10.62 K/UL

## 2017-03-10 PROCEDURE — 25000003 PHARM REV CODE 250: Performed by: PHYSICIAN ASSISTANT

## 2017-03-10 PROCEDURE — 94640 AIRWAY INHALATION TREATMENT: CPT

## 2017-03-10 PROCEDURE — 92526 ORAL FUNCTION THERAPY: CPT

## 2017-03-10 PROCEDURE — 97803 MED NUTRITION INDIV SUBSEQ: CPT

## 2017-03-10 PROCEDURE — 85025 COMPLETE CBC W/AUTO DIFF WBC: CPT

## 2017-03-10 PROCEDURE — 80053 COMPREHEN METABOLIC PANEL: CPT

## 2017-03-10 PROCEDURE — 83735 ASSAY OF MAGNESIUM: CPT

## 2017-03-10 PROCEDURE — 25000242 PHARM REV CODE 250 ALT 637 W/ HCPCS: Performed by: PHYSICIAN ASSISTANT

## 2017-03-10 PROCEDURE — 97110 THERAPEUTIC EXERCISES: CPT

## 2017-03-10 PROCEDURE — 20000000 HC ICU ROOM

## 2017-03-10 PROCEDURE — 99233 SBSQ HOSP IP/OBS HIGH 50: CPT | Mod: ,,, | Performed by: PHYSICIAN ASSISTANT

## 2017-03-10 PROCEDURE — 84100 ASSAY OF PHOSPHORUS: CPT | Mod: 91

## 2017-03-10 PROCEDURE — 37799 UNLISTED PX VASCULAR SURGERY: CPT

## 2017-03-10 PROCEDURE — 84295 ASSAY OF SERUM SODIUM: CPT

## 2017-03-10 PROCEDURE — 84100 ASSAY OF PHOSPHORUS: CPT

## 2017-03-10 PROCEDURE — 99233 SBSQ HOSP IP/OBS HIGH 50: CPT | Mod: ,,, | Performed by: PSYCHIATRY & NEUROLOGY

## 2017-03-10 PROCEDURE — 82803 BLOOD GASES ANY COMBINATION: CPT

## 2017-03-10 PROCEDURE — 92507 TX SP LANG VOICE COMM INDIV: CPT

## 2017-03-10 PROCEDURE — 25000003 PHARM REV CODE 250: Performed by: PSYCHIATRY & NEUROLOGY

## 2017-03-10 RX ORDER — HEPARIN SODIUM 5000 [USP'U]/ML
5000 INJECTION, SOLUTION INTRAVENOUS; SUBCUTANEOUS EVERY 8 HOURS
Status: DISCONTINUED | OUTPATIENT
Start: 2017-03-10 | End: 2017-03-13

## 2017-03-10 RX ORDER — PROPRANOLOL HYDROCHLORIDE 10 MG/1
10 TABLET ORAL 2 TIMES DAILY
Status: DISCONTINUED | OUTPATIENT
Start: 2017-03-10 | End: 2017-03-13

## 2017-03-10 RX ORDER — SODIUM CHLORIDE 9 MG/ML
INJECTION, SOLUTION INTRAVENOUS CONTINUOUS
Status: DISCONTINUED | OUTPATIENT
Start: 2017-03-10 | End: 2017-03-13

## 2017-03-10 RX ADMIN — DILTIAZEM HYDROCHLORIDE 60 MG: 60 TABLET, FILM COATED ORAL at 05:03

## 2017-03-10 RX ADMIN — ATORVASTATIN CALCIUM 40 MG: 20 TABLET, FILM COATED ORAL at 09:03

## 2017-03-10 RX ADMIN — Medication 3 ML: at 05:03

## 2017-03-10 RX ADMIN — PROPRANOLOL HYDROCHLORIDE 10 MG: 10 TABLET ORAL at 10:03

## 2017-03-10 RX ADMIN — Medication 10 ML: at 05:03

## 2017-03-10 RX ADMIN — SODIUM PHOSPHATE, MONOBASIC, MONOHYDRATE 30 MMOL: 276; 142 INJECTION, SOLUTION INTRAVENOUS at 07:03

## 2017-03-10 RX ADMIN — STANDARDIZED SENNA CONCENTRATE AND DOCUSATE SODIUM 1 TABLET: 8.6; 5 TABLET, FILM COATED ORAL at 09:03

## 2017-03-10 RX ADMIN — LEVOTHYROXINE SODIUM 25 MCG: 25 TABLET ORAL at 05:03

## 2017-03-10 RX ADMIN — HEPARIN SODIUM 5000 UNITS: 5000 INJECTION, SOLUTION INTRAVENOUS; SUBCUTANEOUS at 09:03

## 2017-03-10 RX ADMIN — ASPIRIN 81 MG CHEWABLE TABLET 81 MG: 81 TABLET CHEWABLE at 09:03

## 2017-03-10 RX ADMIN — CALCIUM 500 MG: 500 TABLET ORAL at 08:03

## 2017-03-10 RX ADMIN — DILTIAZEM HYDROCHLORIDE 60 MG: 60 TABLET, FILM COATED ORAL at 09:03

## 2017-03-10 RX ADMIN — Medication 10 ML: at 12:03

## 2017-03-10 RX ADMIN — PROPRANOLOL HYDROCHLORIDE 10 MG: 10 TABLET ORAL at 09:03

## 2017-03-10 RX ADMIN — CALCIUM 500 MG: 500 TABLET ORAL at 05:03

## 2017-03-10 RX ADMIN — VITAMIN D, TAB 1000IU (100/BT) 1000 UNITS: 25 TAB at 09:03

## 2017-03-10 RX ADMIN — Medication 3 ML: at 02:03

## 2017-03-10 RX ADMIN — IPRATROPIUM BROMIDE AND ALBUTEROL SULFATE 3 ML: .5; 3 SOLUTION RESPIRATORY (INHALATION) at 03:03

## 2017-03-10 RX ADMIN — IPRATROPIUM BROMIDE AND ALBUTEROL SULFATE 3 ML: .5; 3 SOLUTION RESPIRATORY (INHALATION) at 08:03

## 2017-03-10 RX ADMIN — ACETAMINOPHEN 650 MG: 325 TABLET ORAL at 10:03

## 2017-03-10 RX ADMIN — SODIUM PHOSPHATE, MONOBASIC, MONOHYDRATE 30 MMOL: 276; 142 INJECTION, SOLUTION INTRAVENOUS at 04:03

## 2017-03-10 RX ADMIN — SODIUM CHLORIDE: 0.9 INJECTION, SOLUTION INTRAVENOUS at 09:03

## 2017-03-10 RX ADMIN — VITAMIN C 1 TABLET: TAB at 09:03

## 2017-03-10 RX ADMIN — HYDROCHLOROTHIAZIDE 25 MG: 12.5 TABLET ORAL at 09:03

## 2017-03-10 RX ADMIN — Medication: at 09:03

## 2017-03-10 RX ADMIN — DILTIAZEM HYDROCHLORIDE 60 MG: 60 TABLET, FILM COATED ORAL at 02:03

## 2017-03-10 NOTE — ASSESSMENT & PLAN NOTE
Stroke RF.  Likely etiology of patient's AIS.  Plan to start AC this admission.  Hold for now, given tPA and potential size of stroke and decision for aggressive therapy

## 2017-03-10 NOTE — PLAN OF CARE
Problem: Occupational Therapy Goal  Goal: Occupational Therapy Goal  Goals to be met by: 3/15/17     Patient will increase functional independence with ADLs by performing:    UE Dressing with Moderate Assistance.  LE Dressing with Maximum Assistance.  Grooming while seated with Moderate Assistance.  Toileting from bedside commode with Maximum Assistance for hygiene and clothing management.   Rolling to Bilateral with Moderate Assistance.   Supine to sit with Maximum Assistance.  Stand pivot transfers with Maximum Assistance.  Pt will follow 4/4 one step commands.   Outcome: Ongoing (interventions implemented as appropriate)  Goals remain appropriate

## 2017-03-10 NOTE — ASSESSMENT & PLAN NOTE
Likely cardioembolic in etiology, given recent Dx of afib not on anticoagulation.  Patient now dnr /dni - family considering goals of care    Antithrombotics for secondary stroke prevention: asa 81    Statins for secondary stroke prevention and hyperlipidemia, if present: Atorvastatin- 40 mg oral daily.     Aggressive risk factor modification: Hypertension, High Cholesterol, Diet, Exercise and Obesity, Rehab Efforts: Physical Therapy, Occupational Therapy, Speech and Language Pathology and Physical Medicine and Rehabilitation    Diagnostics: Ordered/Pending  MRI head without contrast to assess brain parenchyma    VTE Prophylaxis: heparin sq    BP Parameters: SBP <180    Nursing Orders: Neuro checks- q1h, Antiembolic stockings, Swallowing evaluation by Nursing, Out of bed BID, Avoid Mace catheter, Pneumatic compression device, Stroke Education, Blood glucose target 100-130, Up with assistance and IV Fluids: Per primary team

## 2017-03-10 NOTE — PT/OT/SLP PROGRESS
Occupational Therapy  Treatment    Zena Aguirre   MRN: [id number]   Admitting Diagnosis: Acute ischemic left MCA stroke    OT Date of Treatment: 03/10/17   OT Start Time: 1353  OT Stop Time: 1406  OT Total Time (min): 13 min    Billable Minutes:  Therapeutic Exercise 13    General Precautions: Standard, aspiration, fall, NPO (DNR)    Do you have any cultural, spiritual, Restorationism conflicts, given your current situation?: Evangelical    Subjective:  Communicated with RN prior to session.  Pt with no verbalizations.  Pt's spouse in agreement with OT performing ROM with pt.    Pain Ratin/10  Pain Rating Post-Intervention: 0/10    Objective:  Patient found with: arterial line, blood pressure cuff, pulse ox (continuous), NG tube, telemetry, oxygen, peripheral IV, sesay catheter     Therapeutic Activities and Exercises:  Pt with elevated HR prior to OT session therefore provided in bed session only on this date.  Provided PROM to BUE in all planes available x 10 reps each while supine.  Provided elevated positioning of RUE at end of session.  Provided education to pt's family regarding POC for today's session & that plan was for low stimuli due to pt with elevated HR prior to session. Pt with HR in 70's throughout session.  Pt with eyes closed 100% of session.  Pt followed no commands.  Pt's family with no further questions after session.    AM-PAC 6 CLICK ADL   How much help from another person does this patient currently need?   1 = Unable, Total/Dependent Assistance  2 = A lot, Maximum/Moderate Assistance  3 = A little, Minimum/Contact Guard/Supervision  4 = None, Modified [place]/Independent    Putting on and taking off regular lower body clothing? : 1  Bathing (including washing, rinsing, drying)?: 1  Toileting, which includes using toilet, bedpan, or urinal? : 1  Putting on and taking off regular upper body clothing?: 1  Taking care of personal grooming such as brushing teeth?: 1  Eating meals?: 1  Total Score:  6     AM-PAC Raw Score CMS G-Code Modifier Level of Impairment Assistance   6 % Total / Unable   7 - 9 CM 80 - 100% Maximal Assist   10 - 14 CL 60 - 80% Moderate Assist   15 - 19 CK 40 - 60% Moderate Assist   20 - 22 CJ 20 - 40% Minimal Assist   23 CI 1-20% SBA / CGA   24 CH 0% Independent/ Mod I     Patient left supine with all lines intact, call button in reach, RN notified, pt's son & spouse present and white board update.    ASSESSMENT:  Zena Aguirre is a 81 y.o. female with a medical diagnosis of Acute ischemic left MCA stroke and presents with limited participation on this date due to lethargy.  Pt continues to require (A) with all activities.    Rehab identified problem list/impairments: Rehab identified problem list/impairments: weakness, impaired endurance, impaired sensation, impaired self care skills, impaired balance, impaired functional mobilty, impaired cognition, decreased safety awareness, decreased coordination, decreased upper extremity function, decreased lower extremity function    Rehab potential is limited.    Activity tolerance: limited    Discharge recommendations: Discharge Facility/Level Of Care Needs: nursing facility, skilled     GOALS:   Occupational Therapy Goals        Problem: Occupational Therapy Goal    Goal Priority Disciplines Outcome Interventions   Occupational Therapy Goal     OT, PT/OT Ongoing (interventions implemented as appropriate)    Description:  Goals to be met by: 3/15/17     Patient will increase functional independence with ADLs by performing:    UE Dressing with Moderate Assistance.  LE Dressing with Maximum Assistance.  Grooming while seated with Moderate Assistance.  Toileting from bedside commode with Maximum Assistance for hygiene and clothing management.   Rolling to Bilateral with Moderate Assistance.   Supine to sit with Maximum Assistance.  Stand pivot transfers with Maximum Assistance.  Pt will follow 4/4 one step commands.                 Plan:  Patient to be seen 5 x/week to address the above listed problems via self-care/home management, neuromuscular re-education, cognitive retraining, sensory integration, therapeutic activities, therapeutic exercises  Plan of Care expires: 04/07/17  Plan of Care reviewed with: patient, spouse, son         Ann VERONA Bolden  03/10/2017

## 2017-03-10 NOTE — PT/OT/SLP PROGRESS
"Speech Language Pathology  Treatment    Zena Aguirre   MRN: [id number]   Admitting Diagnosis: Acute ischemic left MCA stroke    Diet recommendations: Solid Diet Level: NPO  Liquid Diet Level: NPO     SLP Treatment Date: 03/10/17  Speech Start Time: 0936     Speech Stop Time: 1000     Speech Total (min): 24 min       TREATMENT BILLABLE MINUTES:  Speech Therapy Individual 12 and Treatment Swallowing Dysfunction 12    Has the patient been evaluated by SLP for swallowing? : Yes  Keep patient NPO?: Yes   General Precautions: Standard, aphasia, aspiration, fall, NPO  Current Respiratory Status: other (comment) (room air)       Subjective:  "I gave her some sips of water from the spoon and she was grabbing for the cup."  Pt's spouse stated    Pain Ratin/10              Pain Rating Post-Intervention: 0/10    Objective:   Patient found with: blood pressure cuff, NG tube, peripheral IV, arterial line, sesay catheter, pressure relief boots, telemetry, pulse ox (continuous)    Pt was seated upright w/ RN assistance for po trials for optimal swallowing safety.  She demonstrated decreased tolerance of HOB positioning to 90 degrees evidenced by increased heart rate.  HOB was lowered to semi-reclined position w/ improved pt tolerance.  Pt was provided oral care w/ moistened swabs to gums, teeth, cheek and tongue.  She demonstrated grimacing and withdrawal w/ oral stimulation.  She did elicit several swallows in response to stimulation.  She was provided single small ice chip x1 for swallow assessment.  She demonstrated fair oral bolus manipulation, slow oral transit and multiple swallows.  Pt was noted w/ wet breath sounds post trial.  Additional trials were deferred.    Pt answered no personal orientation yes/no questions via any modality.  She followed no single step directives despite max cues.  She was unable to produce any voicing despite max cues or w/ auto speech tasks.  Education was provided to pt's family regarding the " need for npo status, risk of aspiration including silent aspiration, oral care and SLP poc.  Pt's spouse indicated fair understanding.    FIM:                                 Assessment:  Zena Aguirre is a 81 y.o. female with a medical diagnosis of Acute ischemic left MCA stroke and presents with aphasia, apraxia, dysarthria, dysphagia.    Discharge recommendations: Discharge Facility/Level Of Care Needs: nursing facility, skilled     Goals:   SLP Goals        Problem: SLP Goal    Goal Priority Disciplines Outcome   SLP Goal     SLP Ongoing (interventions implemented as appropriate)   Description:  Updated Speech Language Pathology Goals  Goals expected to be met by 3/22  1. Pt will participate in ongoing swallowing assessment to determine if safe to begin PO intake.  2. Pt will vocalize x1 during session.  3. Pt will follow simple, one step commands given max cues with 70% acc to improve overall receptive language  4. Pt will answer simple y/n questions in any modality with 70% acc to improve overall receptive language skills  5. Pt will identify objects in FO2 via eye gaze with 60% acc to improve overall receptive language skills      Speech Language Pathology Goals  Goals expected to be met by 3/14:  1. Pt will participate in ongoing swallowing assessment to determine if safe to begin PO intake.  2. Pt will participate in speech/language evaluation to determine further goals.                     Plan:   Patient to be seen Therapy Frequency: 5 x/week   Plan of Care expires: 04/05/17  Plan of Care reviewed with: patient, spouse, son  SLP Follow-up?: Yes              Ann Brantley CCC-SLP  03/10/2017

## 2017-03-10 NOTE — ASSESSMENT & PLAN NOTE
Stroke RF.  Likely etiology of patient's stroke.  Plan to start AC this admission.  Hold for now, given tPA and potential size of stroke and decision for aggressive therapy   Treatment per primary team

## 2017-03-10 NOTE — PROGRESS NOTES
[facility]  Vascular Neurology  Comprehensive Stroke Center  Progress Note      Neurologic Chief Complaint: L MCA stroke     Subjective:     Interval History: Patient is seen for follow-up neurological assessment and treatment recommendations: multiple family members at bedside throughout the day. Patient with increased heart rate, in and out of afib, treated with iv meds.     HPI, Past Medical, Family, and Social History remains the same as documented in the initial encounter.     Review of Systems   Constitutional: Negative for fever.   Cardiovascular:        (+) tachycardia   Neurological: Positive for facial asymmetry, speech difficulty and weakness.     Scheduled Meds:   albuterol-ipratropium 2.5mg-0.5mg/3mL  3 mL Nebulization Q8H    aspirin  81 mg Per NG tube Daily    atorvastatin  40 mg Per NG tube Daily    B-complex with vitamin C  1 tablet Per NG tube Daily    calcium carbonate  500 mg Per NG tube BID WM    diltiaZEM  60 mg Per NG tube Q8H    hydrochlorothiazide  25 mg Per NG tube Daily    levothyroxine  25 mcg Per NG tube Before breakfast    senna-docusate 8.6-50 mg  1 tablet Per NG tube BID    sodium chloride 0.9%  10 mL Intravenous Q6H    sodium chloride 0.9%  3 mL Intravenous Q8H    sodium chloride 3%  250 mL Intravenous Once    vitamin D  1,000 Units Per NG tube Daily     Continuous Infusions:     PRN Meds:acetaminophen, hydrALAZINE, flu vacc [id number] 65yr up(PF), labetalol, magnesium sulfate IVPB, magnesium sulfate IVPB, pneumoc 13-evette conj-dip cr(PF), potassium chloride **AND** potassium chloride **AND** potassium chloride, Flushing PICC Protocol **AND** sodium chloride 0.9% **AND** sodium chloride 0.9%, sodium phosphate IVPB, sodium phosphate IVPB, sodium phosphate IVPB    Objective:     Vital Signs (Most Recent):  Temp: 99.1 °F (37.3 °C) (03/09/17 1901)  Pulse: 74 (03/09/17 2200)  Resp: (!) 26 (03/09/17 2200)  BP: (!) 156/69 (03/09/17 2102)  SpO2: 95 % (03/09/17  2200)  BP Location: Right arm    Vital Signs Range (Last 24H):  Temp:  [98.6 °F (37 °C)-100.7 °F (38.2 °C)]   Pulse:  []   Resp:  [18-33]   BP: (152-189)/()   SpO2:  [94 %-100 %]   Arterial Line BP: (137-187)/(54-81)   BP Location: Right arm    Physical Exam   Constitutional: She appears well-developed and well-nourished.   HENT:   Head: Normocephalic.   Eyes:   Gaze left    Cardiovascular:   tachy   Neurological:   Drowsy   Skin: Skin is warm.   Nursing note and vitals reviewed.      Neurological Exam:   LOC: does not follow requests and drowsy  Language: Global aphasia  Speech: Aphasic   Orientation: Aphasic  EOM (CN III, IV, VI): Gaze preference left  Facial Movement (CN VII): lower weakness right lower  Motor*: Arm Left:  Paretic:  4/5, Leg Left:   Paretic:  4/5, Arm Right:   Plegic (0/5), Leg Right:   Plegic (0/5)  Sensation: caesar-hypoesthesia right    NIH Stroke Scale:    Level of Consciousness: 1 - drowsy  LOC Questions: 2 - answers none correctly  LOC Commands: 2 - performs neither correctly  Best Gaze: 1 - partial gaze palsy  Visual: 2 - complete hemianopia  Facial Palsy: 1 - minor  Motor Left Arm: 1 - drift  Motor Right Arm: 4 - no movement  Motor Left Le - no drift  Motor Right Le - no movement  Limb Ataxia: 0 - absent  Sensory: 1 - mild to moderate loss  Best Language: 3 - mute  Dysarthria: 0 - normal articulation  Extinction and Inattention: 0 - no neglect  NIH Stroke Scale Total: 22      Laboratory:  CMP:     Recent Labs  Lab 17  1751   CALCIUM 8.1*   ALBUMIN 2.9*   PROT 6.8   *  152*   K 3.0*   CO2 20*   *   BUN 17   CREATININE 0.8   ALKPHOS 41*   ALT 22   AST 42*   BILITOT 1.0     CBC:     Recent Labs  Lab 17  0200   WBC 9.50   RBC 3.36*   HGB 11.1*   HCT 32.3*      MCV 96   MCH 33.0*   MCHC 34.4     Lipid Panel:     Recent Labs  Lab 17  0527   CHOL 120   LDLCALC 68.2   HDL 40   TRIG 59     Coagulation:     Recent Labs  Lab 17  0749    INR 1.1   APTT 21.2     Platelet Aggregation Study: No results for input(s): PLTAGG, PLTAGINTERP, PLTAGREGLACO, ADPPLTAGGREG in the last 168 hours.  Hgb A1C:     Recent Labs  Lab 03/07/17  0527   HGBA1C 5.9     TSH:     Recent Labs  Lab 03/07/17 0527   TSH 0.109*       Diagnostic Results:  I have personally reviewed  CT head 3/7/17  Temporal evolution of large left MCA territory  acute infarction with mass effect on the underlying parenchyma, sulci and left lateral ventricle with a 0.3 cm rightward midline shift.  No evidence of hydrocephalus or intracranial hemorrhage in this patient status post tPA.  Continued followup is recommended.     Echo 3/7/17  Moderately enlarged LA  CONCLUSIONS     1 - Concentric remodeling.     2 - Normal left ventricular systolic function (EF 60-65%).     3 - Right ventricular enlargement with moderately depressed systolic function.     4 - Left ventricular diastolic dysfunction.     5 - Biatrial enlargement.     6 - Mild aortic stenosis, MATEO = 1.6 cm2, peak velocity = 2.0 m/s, mean gradient = 10 mmHg.     7 - Severe tricuspid regurgitation.     8 - Pulmonary hypertension. The estimated PA systolic pressure is 102 mmHg.     9 - Increased central venous pressure.          Assessment/Plan:     81 year old female with L MCA stroke with right hemiparesis, and aphasia. History of afib. Received tpa on 3/6/17  Discussion with NCC team regarding goals of care noted.         * Acute ischemic left MCA stroke  Likely cardioembolic in etiology, given recent Dx of afib not on anticoagulation.  Patient now dnr /dni - family considering goals of care    Antithrombotics for secondary stroke prevention: can start asa , can hold if considering neurosurgical intervention, consulted today     Statins for secondary stroke prevention and hyperlipidemia, if present: Atorvastatin- 40 mg oral daily.     Aggressive risk factor modification: Hypertension, High Cholesterol, Diet, Exercise and Obesity, Rehab  Efforts: Physical Therapy, Occupational Therapy, Speech and Language Pathology and Physical Medicine and Rehabilitation    Diagnostics: Ordered/Pending HgbA1C to assess blood glucose levels, MRI head without contrast to assess brain parenchyma, TSH to assess thyroid function    VTE Prophylaxis: Hold x24h s/p tPA, BP Parameters: SBP <180. Can continue to hold if considering neurosurgical intervention     Nursing Orders: Neuro checks- q4h, Antiembolic stockings, Swallowing evaluation by Nursing, Out of bed BID, Avoid Mace catheter, Pneumatic compression device, Stroke Education, Blood glucose target 100-130, Up with assistance and IV Fluids: Per primary team    Hypothyroidism  Stroke RF  Synthroid    Essential hypertension  Stroke RF  SBP goal <180  Management per primary team    Paroxysmal atrial fibrillation  Stroke RF.  Likely etiology of patient's AIS.  Plan to start AC this admission.  Hold for now, given tPA and potential size of stroke and decision for aggressive therapy     Hyperlipidemia  Stroke risk factor  Goal LDL <70  LDL -68  Atorvastatin 40mg daily    Cytotoxic cerebral edema  As seen on imaging from Acute ischemic stroke - L MCA       JOSE Gerber  Comprehensive Stroke Center  Department of Vascular Neurology   [facility]

## 2017-03-10 NOTE — PROGRESS NOTES
ICU Progress Note  Neurocritical Care    Admit Date: 3/7/2017  LOS: 3    CC: Acute ischemic left MCA stroke    Code Status: DNR     SUBJECTIVE:     Interval History/Significant Events: Acute embolic L  M1 occlusion and L MCA terriotry  infarct,  admitted on 3/7 after receiving IV tPa, no thrombectomy due to CT  changes on early imaging.   -- Repeat imaging at 24 hours revealed enlarged area of cytotoxic edema with mass effect and  rightward MLS of .0.3 cm- hypertonic saline initiated as well as 1 push of mannitol . Holding ASA and SQH currently  due to size of infarct with concern for hemorrhagic conversion vs surgical intervention. NSx aware of patient.     One episode of RVR overnight , resolved with metoprolol 5 IV. Will increase diltiazem PO dose for better control.         Review of Symptoms:   Nanci due to aphasia    Medications:  Continuous Infusions:     Scheduled Meds:   albuterol-ipratropium 2.5mg-0.5mg/3mL  3 mL Nebulization Q8H    aspirin  81 mg Per NG tube Daily    atorvastatin  40 mg Per NG tube Daily    B-complex with vitamin C  1 tablet Per NG tube Daily    calcium carbonate  500 mg Per NG tube BID WM    diltiaZEM  60 mg Per NG tube Q8H    hydrochlorothiazide  25 mg Per NG tube Daily    levothyroxine  25 mcg Per NG tube Before breakfast    senna-docusate 8.6-50 mg  1 tablet Per NG tube BID    sodium chloride 0.9%  10 mL Intravenous Q6H    sodium chloride 0.9%  3 mL Intravenous Q8H    sodium chloride 3%  250 mL Intravenous Once    vitamin D  1,000 Units Per NG tube Daily     PRN Meds:.acetaminophen, hydrALAZINE, flu vacc [id number] 65yr up(PF), labetalol, magnesium sulfate IVPB, magnesium sulfate IVPB, pneumoc 13-evette conj-dip cr(PF), potassium chloride **AND** potassium chloride **AND** potassium chloride, Flushing PICC Protocol **AND** sodium chloride 0.9% **AND** sodium chloride 0.9%, sodium phosphate IVPB, sodium phosphate IVPB, sodium phosphate IVPB    OBJECTIVE:   Vital Signs (Most  Recent):   Temp: 98.6 °F (37 °C) (03/10/17 0705)  Pulse: 102 (03/10/17 0834)  Resp: 20 (03/10/17 0834)  BP: (!) 162/70 (03/10/17 0805)  SpO2: 97 % (03/10/17 0805)    Vital Signs (24h Range):   Temp:  [98.6 °F (37 °C)-99.3 °F (37.4 °C)] 98.6 °F (37 °C)  Pulse:  [] 102  Resp:  [17-35] 20  SpO2:  [94 %-100 %] 97 %  BP: (116-178)/(67-85) 162/70  Arterial Line BP: (133-173)/(53-76) 153/61    ICP/CPP (Last 24h):        I & O (Last 24h):     Intake/Output Summary (Last 24 hours) at 03/10/17 0858  Last data filed at 03/10/17 0805   Gross per 24 hour   Intake             1995 ml   Output             2360 ml   Net             -365 ml     Physical Exam:  GA: Alert, comfortable, no acute distress.   HEENT: No scleral icterus or JVD.   Pulmonary: bibasilar wheezes, tolerating RA   Cardiac: RRR S1 & S2 w/o rubs/murmurs/gallops.   Abdominal: Bowel sounds present x 4. No appreciable hepatosplenomegaly.  Skin: No jaundice, rashes, or visible lesions.  Neuro:  --GCS: E4 V1 M5  --Mental Status: awake, alert, aphasic  --L gaze preference   --Pupils 3 mm, PERRL.   --Corneal reflex, gag, cough intact.  --RUE flexion to pain, RLE withdraws to pain  --moves Left side spontaneously  --does not follow verbal commands       Lines/Drains/Airway:          PICC Double Lumen 03/08/17 1208 left brachial (Active)   Site Assessment Clean;Dry;Intact 3/9/2017  7:05 AM   Lumen 1 Status Infusing 3/9/2017  7:05 AM   Lumen 2 Status Infusing 3/9/2017  7:05 AM   Length verónica (cm) 36 cm 3/8/2017 12:05 PM   Current Exposed Catheter (cm) 0 cm 3/8/2017 12:05 PM   Extremity Circumference (cm) 25 cm 3/8/2017 12:05 PM   Dressing Type Transparent 3/9/2017  7:05 AM   Dressing Status Biopatch in place;Clean;Dry;Intact 3/9/2017  7:05 AM   Dressing Intervention Dressing reinforced 3/9/2017  7:05 AM   Dressing Change Due 03/15/17 3/9/2017  7:05 AM   Daily Line Review Performed 3/9/2017  7:05 AM           Arterial Line 03/08/17 1530 Right Radial (Active)   Site  "Assessment Clean;Dry;Intact 3/9/2017  7:05 AM   Line Status Pulsatile blood flow 3/9/2017  7:05 AM   Art Line Waveform Appropriate;Square wave test performed 3/9/2017  7:05 AM   Arterial Line Interventions Zeroed and calibrated;Leveled;Connections checked and tightened;Flushed per protocol 3/9/2017  7:05 AM   Color/Movement/Sensation Capillary refill less than 3 sec 3/9/2017  7:05 AM   Dressing Type Transparent 3/9/2017  7:05 AM   Dressing Status Biopatch in place;Clean;Dry;Intact 3/9/2017  7:05 AM   Dressing Intervention Dressing reinforced 3/9/2017  7:05 AM   Dressing Change Due 03/15/17 3/9/2017  7:05 AM           NG/OG Tube 03/08/17 0615 Morrill sump 14 Fr. Right nostril (Active)   Placement Check placement verified by distal tube length measurement;placement verified by x-ray 3/9/2017  7:05 AM   Distal Tube Length (cm) 63 3/9/2017  7:05 AM   Clamp Status/Tolerance clamped;no abdominal discomfort;no abdominal distention;no emesis;no nausea;no residual;no restlessness 3/9/2017  7:05 AM   Flush/Irrigation flushed w/;water;no resistance met 3/9/2017  3:05 AM   Intake (mL) 100 mL 3/9/2017  8:00 AM   Residual Amount (ml) 0 ml 3/8/2017 11:03 PM            Urethral Catheter 03/06/17 (Active)   Site Assessment Clean;Intact;Dry 3/9/2017  7:05 AM   Collection Container Urimeter 3/9/2017  7:05 AM   Securement Method secured to top of thigh w/ adhesive device 3/9/2017  7:05 AM   Catheter Care Performed yes 3/9/2017  7:05 AM   Reason for Continuing Urinary Catheterization Critically ill in ICU requiring intensive monitoring 3/9/2017  7:05 AM   CAUTI Prevention Bundle StatLock in place w 1" slack;Intact seal between catheter & drainage tubing;Drainage bag off the floor;Green sheeting clip in use;No dependent loops or kinks;Drainage bag not overfilled (<2/3 full);Drainage bag below bladder 3/9/2017  7:05 AM     Labs:  ABG:     Recent Labs  Lab 03/10/17  0332   PH 7.455*   PO2 75*   PCO2 27.4*   HCO3 19.3*   POCSATURATED 96   BE " -5     BMP:    Recent Labs  Lab 03/10/17  0204 03/10/17  0530   * 151*   K 4.1  --    *  --    CO2 20*  --    BUN 23  --    CREATININE 0.8  --    *  --    MG 1.8  --    PHOS <1.0*  --      LFT:   Lab Results   Component Value Date    AST 42 (H) 03/10/2017    ALT 22 03/10/2017    ALKPHOS 40 (L) 03/10/2017    BILITOT 0.8 03/10/2017    ALBUMIN 2.7 (L) 03/10/2017    PROT 6.4 03/10/2017     CBC:   Lab Results   Component Value Date    WBC 10.62 03/10/2017    HGB 10.4 (L) 03/10/2017    HCT 32.1 (L) 03/10/2017     (H) 03/10/2017     (L) 03/10/2017     Microbiology x 7d:   Microbiology Results (last 7 days)     ** No results found for the last 168 hours. **          ASSESSMENT/PLAN:     Patient Active Problem List   Diagnosis    Hypothyroidism    Essential hypertension    Paroxysmal atrial fibrillation    Hyperlipidemia    Acute ischemic left MCA stroke    Cytotoxic cerebral edema    Right flaccid hemiplegia    Pleural effusion, bilateral    Pulmonary hypertension    Cerebrovascular accident (CVA) due to embolism of left middle cerebral artery    Brain compression      Neuro:   Acute embolic L M1 occlusion with large MCA territory infarct  --sp tpa ,no mechanical intervention  --worsening edema on CT on 3/8, hypertonic saline given for 2 days with adequate Na. Will discontinue and start NS- neuro exam stable.  --neurologic exam stable   --ASA started yesterday, continue, add sqh today.   --statin daily   --PT/OT  --will likely need PEG- f/u speech eval today     Pulmonary:   Atelectasis on CXR, wheezes bibasilar  --follow up CXR tomorrow  --monitor for secretions, chest PT if needed  --schedule duonebs    Cardiac:   Pulmonary HTN, Tricuspid regurgitation, newly diagnosed Afib  --euvolemia , diltiazem PO- dose increased.     Renal:    Iatrogenic hypernatremia  --151, monitor q 6 to ensure stable decline  --BUN Cr wnl  --urine output monitoring  --NS at 50    ID:   Afebrile, no  leukocytosis    Hem/Onc:   H/h, plt wnl, stable  sqh  Held until tomorrow.     Endocrine:    SSI    Fluids/Electrolytes/Nutrition/GI:   isosource    PPx- starting sqh today     Uninterrupted Critical Care/Counseling Time (not including procedures): 48 minutes        Cele Huang PA-C  Neurocritical Care  [facility]  Pager/[employer] #[phone]

## 2017-03-10 NOTE — ASSESSMENT & PLAN NOTE
Likely cardioembolic in etiology, given recent Dx of afib not on anticoagulation.  Patient now dnr /dni - family considering goals of care    Antithrombotics for secondary stroke prevention: can start asa , can hold if considering neurosurgical intervention, consulted today     Statins for secondary stroke prevention and hyperlipidemia, if present: Atorvastatin- 40 mg oral daily.     Aggressive risk factor modification: Hypertension, High Cholesterol, Diet, Exercise and Obesity, Rehab Efforts: Physical Therapy, Occupational Therapy, Speech and Language Pathology and Physical Medicine and Rehabilitation    Diagnostics: Ordered/Pending HgbA1C to assess blood glucose levels, MRI head without contrast to assess brain parenchyma, TSH to assess thyroid function    VTE Prophylaxis: Hold x24h s/p tPA, BP Parameters: SBP <180. Can continue to hold if considering neurosurgical intervention     Nursing Orders: Neuro checks- q4h, Antiembolic stockings, Swallowing evaluation by Nursing, Out of bed BID, Avoid Mace catheter, Pneumatic compression device, Stroke Education, Blood glucose target 100-130, Up with assistance and IV Fluids: Per primary team

## 2017-03-10 NOTE — CONSULTS
PM&R consult follow up.      Patient is too acute for rehab evaluation at this time.  PT/OT/SLP following.  Patient is currently totalA/dep with bed mobility, EOB, and ADLs.  NPO for dysphagia with aphasia, apraxia, and dysarthria.  Recommend continue therapy.  Will sign off.  Please call with questions/concerns and re-consult when patient is medically stable and able to participate with therapy.       SKY Wang, FNP-C  Physical Medicine & Rehabilitation   03/10/2017  Spectralink: [id number]

## 2017-03-10 NOTE — PLAN OF CARE
Problem: Patient Care Overview  Goal: Plan of Care Review  Outcome: Ongoing (interventions implemented as appropriate)  POC reviewed with pt and family at 1600. Pt unable to verbalize an understanding, pt's  verbalizes an understanding. Questions and concerns addressed. Neuro remains unchanged, pt does not follow commands, pt moves left side upper and lower extremities spontaneously, and right upper and lower extremities withdraws to pain, pt remains afebrile, acetaminophen administered prn for pain,blood glucose monitored, skin remains intact, electrolytes replaced, Pt progressing toward goals. Will continue to monitor closely. Tube feedings infusing, See flowsheets for full assessment and VS info

## 2017-03-10 NOTE — PLAN OF CARE
SW met with Pt and Pt /son from [facility] at bedside. Discussed therapy recs and list given. They reported wanting to try referrals to [facility] in [place] and [facility] in [place]. They will look at the facilities and let SW know if plan changes.    4:36pm: Sent email to [facility] to complete referrals on Monday.    Gilma Mary LMSW  Neurocritical Care   [facility]  [id number]

## 2017-03-10 NOTE — PROGRESS NOTES
Patient was made a DNR today. The DNR is not in the patient's binding. NCC team notified. Will continue to monitor.

## 2017-03-10 NOTE — PROGRESS NOTES
[facility]  Vascular Neurology  Comprehensive Stroke Center  Progress Note      Neurologic Chief Complaint: L MCA stroke     Subjective:     Interval History: Patient is seen for follow-up neurological assessment and treatment recommendations: HUNTER, in a fib with RVR overnight that resolved with IV metoprolol, diltiazem dose was increased      HPI, Past Medical, Family, and Social History remains the same as documented in the initial encounter.     Review of Systems   Constitutional: Negative for fever.   HENT: Negative for drooling.    Eyes: Negative for redness.   Respiratory: Negative for cough and shortness of breath.    Cardiovascular: Negative for leg swelling.        (+) tachycardia   Gastrointestinal: Negative for vomiting.   Musculoskeletal: Negative for joint swelling.   Skin: Negative for rash.   Neurological: Positive for facial asymmetry, speech difficulty and weakness.   Psychiatric/Behavioral: Negative for agitation.     Scheduled Meds:   albuterol-ipratropium 2.5mg-0.5mg/3mL  3 mL Nebulization Q8H    aspirin  81 mg Per NG tube Daily    atorvastatin  40 mg Per NG tube Daily    B-complex with vitamin C  1 tablet Per NG tube Daily    calcium carbonate  500 mg Per NG tube BID WM    diltiaZEM  60 mg Per NG tube Q8H    heparin (porcine)  5,000 Units Subcutaneous Q8H    hydrochlorothiazide  25 mg Per NG tube Daily    levothyroxine  25 mcg Per NG tube Before breakfast    propranolol  10 mg Per NG tube BID    senna-docusate 8.6-50 mg  1 tablet Per NG tube BID    sodium chloride 0.9%  10 mL Intravenous Q6H    sodium chloride 0.9%  3 mL Intravenous Q8H    sodium chloride 3%  250 mL Intravenous Once    vitamin D  1,000 Units Per NG tube Daily     Continuous Infusions:   sodium chloride 0.9% 50 mL/hr at 03/10/17 1405     PRN Meds:acetaminophen, hydrALAZINE, flu vacc [id number] 65yr up(PF), labetalol, magnesium sulfate IVPB, magnesium sulfate IVPB, pneumoc 13-evette conj-dip  cr(PF), potassium chloride **AND** potassium chloride **AND** potassium chloride, Flushing PICC Protocol **AND** sodium chloride 0.9% **AND** sodium chloride 0.9%, sodium phosphate IVPB, sodium phosphate IVPB, sodium phosphate IVPB    Objective:     Vital Signs (Most Recent):  Temp: 97.7 °F (36.5 °C) (03/10/17 1105)  Pulse: 76 (03/10/17 1546)  Resp: (!) 21 (03/10/17 1546)  BP: (!) 142/69 (03/10/17 1205)  SpO2: 96 % (03/10/17 1405)  BP Location: Right arm    Vital Signs Range (Last 24H):  Temp:  [97.7 °F (36.5 °C)-99.3 °F (37.4 °C)]   Pulse:  []   Resp:  [17-35]   BP: (116-175)/(67-84)   SpO2:  [94 %-100 %]   Arterial Line BP: (127-167)/(50-76)   BP Location: Right arm    Physical Exam   Constitutional: She appears well-developed and well-nourished.   HENT:   Head: Normocephalic.   Eyes: Pupils are equal, round, and reactive to light.   Gaze left    Cardiovascular: Normal rate.    In and out of a fib     Pulmonary/Chest: Effort normal. No respiratory distress.   Abdominal: Soft. She exhibits no distension.   Neurological:   Drowsy   Skin: Skin is warm.   Nursing note and vitals reviewed.      Neurological Exam:   LOC: does not follow requests and drowsy  Language: Global aphasia  Speech: Aphasic   Orientation: Aphasic  EOM (CN III, IV, VI): Gaze preference left  Facial Movement (CN VII): lower weakness right lower  Motor*: Arm Left:  Paretic:  4/5, Leg Left:   Paretic:  4/5, Arm Right:   Plegic (0/5), Leg Right:   Plegic (0/5)  Sensation: caesar-hypoesthesia right    NIH Stroke Scale:    Level of Consciousness: 1 - drowsy  LOC Questions: 2 - answers none correctly  LOC Commands: 2 - performs neither correctly  Best Gaze: 1 - partial gaze palsy  Visual: 2 - complete hemianopia  Facial Palsy: 1 - minor  Motor Left Arm: 1 - drift  Motor Right Arm: 4 - no movement  Motor Left Le - no drift  Motor Right Le - no movement  Limb Ataxia: 0 - absent  Sensory: 1 - mild to moderate loss  Best Language: 3 -  mute  Dysarthria: 0 - normal articulation  Extinction and Inattention: 0 - no neglect  NIH Stroke Scale Total: 22      Laboratory:  CMP:     Recent Labs  Lab 03/10/17  0204  03/10/17  1032   CALCIUM 8.1*  --   --    ALBUMIN 2.7*  --   --    PROT 6.4  --   --    *  < > 150*   K 4.1  --   --    CO2 20*  --   --    *  --   --    BUN 23  --   --    CREATININE 0.8  --   --    ALKPHOS 40*  --   --    ALT 22  --   --    AST 42*  --   --    BILITOT 0.8  --   --    < > = values in this interval not displayed.  CBC:     Recent Labs  Lab 03/10/17  0204   WBC 10.62   RBC 3.17*   HGB 10.4*   HCT 32.1*   *   *   MCH 32.8*   MCHC 32.4     Lipid Panel:     Recent Labs  Lab 03/07/17  0527   CHOL 120   LDLCALC 68.2   HDL 40   TRIG 59     Coagulation:     Recent Labs  Lab 03/07/17  0749   INR 1.1   APTT 21.2     Platelet Aggregation Study: No results for input(s): PLTAGG, PLTAGINTERP, PLTAGREGLACO, ADPPLTAGGREG in the last 168 hours.  Hgb A1C:     Recent Labs  Lab 03/07/17  0527   HGBA1C 5.9     TSH:     Recent Labs  Lab 03/07/17 0527   TSH 0.109*       Diagnostic Results:  I have personally reviewed  CT head. Date: 3/7/17  Temporal evolution of large left MCA territory  acute infarction with mass effect on the underlying parenchyma, sulci and left lateral ventricle with a 0.3 cm rightward midline shift.  No evidence of hydrocephalus or intracranial hemorrhage in this patient status post tPA.  Continued followup is recommended.     CTA stroke multiphase. Date: 3/7/17  -L M1 stenosis  -R vert narrowing    Echo. Date: 3/7/17  Moderately enlarged LA  CONCLUSIONS     1 - Concentric remodeling.     2 - Normal left ventricular systolic function (EF 60-65%).     3 - Right ventricular enlargement with moderately depressed systolic function.     4 - Left ventricular diastolic dysfunction.     5 - Biatrial enlargement.     6 - Mild aortic stenosis, MATEO = 1.6 cm2, peak velocity = 2.0 m/s, mean gradient = 10 mmHg.     7  - Severe tricuspid regurgitation.     8 - Pulmonary hypertension. The estimated PA systolic pressure is 102 mmHg.     9 - Increased central venous pressure.          Assessment/Plan:     81 year old female with L MCA stroke with right hemiparesis, and aphasia. History of afib. Received tpa on 3/6/17  Discussion with NCC team regarding goals of care noted.       3/10/17 Patient was made DNR/DNI, discussed prognosis with family, patient has some movement in R side and would like to give her more time to recover before determining long term prognosis. Neurologically stable      * Embolic stroke involving left middle cerebral artery  Likely cardioembolic in etiology, given recent Dx of afib not on anticoagulation.  Patient now dnr /dni - family considering goals of care    Antithrombotics for secondary stroke prevention: asa 81    Statins for secondary stroke prevention and hyperlipidemia, if present: Atorvastatin- 40 mg oral daily.     Aggressive risk factor modification: Hypertension, High Cholesterol, Diet, Exercise and Obesity, Rehab Efforts: Physical Therapy, Occupational Therapy, Speech and Language Pathology and Physical Medicine and Rehabilitation    Diagnostics: Ordered/Pending  MRI head without contrast to assess brain parenchyma    VTE Prophylaxis: heparin sq    BP Parameters: SBP <180    Nursing Orders: Neuro checks- q1h, Antiembolic stockings, Swallowing evaluation by Nursing, Out of bed BID, Avoid Mace catheter, Pneumatic compression device, Stroke Education, Blood glucose target 100-130, Up with assistance and IV Fluids: Per primary team    Cytotoxic cerebral edema  As seen on imaging from Acute ischemic stroke - L MCA     Hypothyroidism  Stroke RF  Continue synthroid    Essential hypertension  Stroke RF  SBP goal <180  Management per primary team    Paroxysmal atrial fibrillation  Stroke RF.  Likely etiology of patient's stroke.  Plan to start AC this admission.  Hold for now, given tPA and potential  size of stroke and decision for aggressive therapy   Treatment per primary team    Hyperlipidemia  Stroke risk factor  Goal LDL <70  LDL -68  Atorvastatin 40mg daily    Right flaccid hemiplegia  Due to infarct  Therapy following          Elana Chi PA-C  Comprehensive Stroke Center  Department of Vascular Neurology   [facility]

## 2017-03-10 NOTE — SUBJECTIVE & OBJECTIVE
Neurologic Chief Complaint: L MCA stroke     Subjective:     Interval History: Patient is seen for follow-up neurological assessment and treatment recommendations: multiple family members at bedside throughout the day. Patient with increased heart rate, in and out of afib, treated with iv meds.     HPI, Past Medical, Family, and Social History remains the same as documented in the initial encounter.     Review of Systems   Constitutional: Negative for fever.   Cardiovascular:        (+) tachycardia   Neurological: Positive for facial asymmetry, speech difficulty and weakness.     Scheduled Meds:   albuterol-ipratropium 2.5mg-0.5mg/3mL  3 mL Nebulization Q8H    aspirin  81 mg Per NG tube Daily    atorvastatin  40 mg Per NG tube Daily    B-complex with vitamin C  1 tablet Per NG tube Daily    calcium carbonate  500 mg Per NG tube BID WM    diltiaZEM  60 mg Per NG tube Q8H    hydrochlorothiazide  25 mg Per NG tube Daily    levothyroxine  25 mcg Per NG tube Before breakfast    senna-docusate 8.6-50 mg  1 tablet Per NG tube BID    sodium chloride 0.9%  10 mL Intravenous Q6H    sodium chloride 0.9%  3 mL Intravenous Q8H    sodium chloride 3%  250 mL Intravenous Once    vitamin D  1,000 Units Per NG tube Daily     Continuous Infusions:     PRN Meds:acetaminophen, hydrALAZINE, flu vacc [id number] 65yr up(PF), labetalol, magnesium sulfate IVPB, magnesium sulfate IVPB, pneumoc 13-evette conj-dip cr(PF), potassium chloride **AND** potassium chloride **AND** potassium chloride, Flushing PICC Protocol **AND** sodium chloride 0.9% **AND** sodium chloride 0.9%, sodium phosphate IVPB, sodium phosphate IVPB, sodium phosphate IVPB    Objective:     Vital Signs (Most Recent):  Temp: 99.1 °F (37.3 °C) (03/09/17 1901)  Pulse: 74 (03/09/17 2200)  Resp: (!) 26 (03/09/17 2200)  BP: (!) 156/69 (03/09/17 2102)  SpO2: 95 % (03/09/17 2200)  BP Location: Right arm    Vital Signs Range (Last 24H):  Temp:  [98.6 °F (37 °C)-100.7 °F (38.2  °C)]   Pulse:  []   Resp:  [18-33]   BP: (152-189)/()   SpO2:  [94 %-100 %]   Arterial Line BP: (137-187)/(54-81)   BP Location: Right arm    Physical Exam   Constitutional: She appears well-developed and well-nourished.   HENT:   Head: Normocephalic.   Eyes:   Gaze left    Cardiovascular:   tachy   Neurological:   Drowsy   Skin: Skin is warm.   Nursing note and vitals reviewed.      Neurological Exam:   LOC: does not follow requests and drowsy  Language: Global aphasia  Speech: Aphasic   Orientation: Aphasic  EOM (CN III, IV, VI): Gaze preference left  Facial Movement (CN VII): lower weakness right lower  Motor*: Arm Left:  Paretic:  4/5, Leg Left:   Paretic:  4/5, Arm Right:   Plegic (0/5), Leg Right:   Plegic (0/5)  Sensation: caesar-hypoesthesia right    NIH Stroke Scale:    Level of Consciousness: 1 - drowsy  LOC Questions: 2 - answers none correctly  LOC Commands: 2 - performs neither correctly  Best Gaze: 1 - partial gaze palsy  Visual: 2 - complete hemianopia  Facial Palsy: 1 - minor  Motor Left Arm: 1 - drift  Motor Right Arm: 4 - no movement  Motor Left Le - no drift  Motor Right Le - no movement  Limb Ataxia: 0 - absent  Sensory: 1 - mild to moderate loss  Best Language: 3 - mute  Dysarthria: 0 - normal articulation  Extinction and Inattention: 0 - no neglect  NIH Stroke Scale Total: 22      Laboratory:  CMP:     Recent Labs  Lab 17  1751   CALCIUM 8.1*   ALBUMIN 2.9*   PROT 6.8   *  152*   K 3.0*   CO2 20*   *   BUN 17   CREATININE 0.8   ALKPHOS 41*   ALT 22   AST 42*   BILITOT 1.0     CBC:     Recent Labs  Lab 17  0200   WBC 9.50   RBC 3.36*   HGB 11.1*   HCT 32.3*      MCV 96   MCH 33.0*   MCHC 34.4     Lipid Panel:     Recent Labs  Lab 17  0527   CHOL 120   LDLCALC 68.2   HDL 40   TRIG 59     Coagulation:     Recent Labs  Lab 17  0749   INR 1.1   APTT 21.2     Platelet Aggregation Study: No results for input(s): PLTAGG, PLTAGINTERP,  PLTAGREGLACO, ADPPLTAGGREG in the last 168 hours.  Hgb A1C:     Recent Labs  Lab 03/07/17 0527   HGBA1C 5.9     TSH:     Recent Labs  Lab 03/07/17 0527   TSH 0.109*       Diagnostic Results:  I have personally reviewed  CT head 3/7/17  Temporal evolution of large left MCA territory  acute infarction with mass effect on the underlying parenchyma, sulci and left lateral ventricle with a 0.3 cm rightward midline shift.  No evidence of hydrocephalus or intracranial hemorrhage in this patient status post tPA.  Continued followup is recommended.     Echo 3/7/17  Moderately enlarged LA  CONCLUSIONS     1 - Concentric remodeling.     2 - Normal left ventricular systolic function (EF 60-65%).     3 - Right ventricular enlargement with moderately depressed systolic function.     4 - Left ventricular diastolic dysfunction.     5 - Biatrial enlargement.     6 - Mild aortic stenosis, MATEO = 1.6 cm2, peak velocity = 2.0 m/s, mean gradient = 10 mmHg.     7 - Severe tricuspid regurgitation.     8 - Pulmonary hypertension. The estimated PA systolic pressure is 102 mmHg.     9 - Increased central venous pressure.

## 2017-03-10 NOTE — PLAN OF CARE
Problem: SLP Goal  Goal: SLP Goal  Updated Speech Language Pathology Goals  Goals expected to be met by 3/22  1. Pt will participate in ongoing swallowing assessment to determine if safe to begin PO intake.  2. Pt will vocalize x1 during session.  3. Pt will follow simple, one step commands given max cues with 70% acc to improve overall receptive language  4. Pt will answer simple y/n questions in any modality with 70% acc to improve overall receptive language skills  5. Pt will identify objects in FO2 via eye gaze with 60% acc to improve overall receptive language skills      Speech Language Pathology Goals  Goals expected to be met by 3/14:  1. Pt will participate in ongoing swallowing assessment to determine if safe to begin PO intake.  2. Pt will participate in speech/language evaluation to determine further goals.      Outcome: Ongoing (interventions implemented as appropriate)  Pt participation limited by lethargy.  Severe Global aphasia and dysphagia persist.  Goals remain appropriate.  Cont ST per POC.     Ann Brantley CCC-SLP  3/10/2017

## 2017-03-10 NOTE — PLAN OF CARE
Problem: Patient Care Overview  Goal: Plan of Care Review  Outcome: Ongoing (interventions implemented as appropriate)  POC reviewed with pt and  at bedside at 0500. Pt unable to, but  verbalizes understanding. Questions and concerns addressed. No acute events overnight. Pt progressing toward goals. Will continue to monitor. See flowsheets for full assessment and VS info

## 2017-03-10 NOTE — PROGRESS NOTES
[facility]  Adult Nutrition  Consult Note    SUMMARY     Recommendations    1. Continue current TF regimen of Isosource 1.5 @ 40 mL/hr.    - Hold for residuals >400 mL; additional fluid per MD.   2. If able to advance diet, recommend cardiac, texture per SLP.   3. RD to monitor & follow-up.    Goals: Some sort of nutrition within 48 hours  Nutrition Goal Status: goal met  Communication of RD Recs: reviewed with RN    Reason for Assessment    Reason for Assessment: RD follow-up  Diagnosis: stroke/CVA  Relevent Medical History: HTN, HLD   Interdisciplinary Rounds: did not attend     General Information Comments: Pt tolerating current TF regimen at goal rate via NGT. ST recommends pt remain NPO. Pt non-verbal.   Discharge planning: will assess daily.    Nutrition Prescription Ordered    Current Diet Order: NPO     Current Nutrition Support Formula Ordered: Isosource 1.5  Current Nutrition Support Rate Ordered: 40 (ml)  Current Nutrition Support Frequency Ordered: mL/hr    Evaluation of Received Nutrients/Fluid Intake    Enteral Calories (kcal): 1440  Enteral Protein (gm): 65  Enteral (Free Water) Fluid (mL): 733     Energy Calories Required: meeting needs     Protein Required: meeting needs     IV Fluid (mL): 1200      Fluid Required: meeting needs     Tolerance: tolerating     Nutrition Risk Screen     Nutrition Risk Screen: tube feeding or parenteral nutrition    Nutrition/Diet History    Patient Reported Diet/Restrictions/Preferences: other (see comments) (JUAN CARLOS)     Factors Affecting Nutritional Intake: NPO, difficulty/impaired swallowing    Labs/Tests/Procedures/Meds    Pertinent Labs Reviewed: reviewed, pertinent  Pertinent Labs Comments: Na 152, Gluc 174  Pertinent Medications Reviewed: reviewed, pertinent  Pertinent Medications Comments: Statin, IVF @ 50 mL/hr    Physical Findings    Overall Physical Appearance: underweight  Tubes: nasogastric tube  Oral/Mouth Cavity: WDL  Skin:  intact    Anthropometrics    Height (inches): 62.01 in  Weight Method: Bed Scale  Weight (kg): 60 kg     Ideal Body Weight (IBW), Female: 110.05 lb  % Ideal Body Weight, Female (lb): 120.2 lb     BMI (kg/m2): 24.19  BMI Grade: 18.5-24.9 - normal    Estimated/Assessed Needs    Weight Used For Calorie Calculations: 60 kg (132 lb 4.4 oz)   Height (cm): 157.5 cm     Energy Need Method: Avenel-St Jeor, other (see comments) (9448-8782 kcal/d)     Weight Used For Protein Calculations: 60 kg (132 lb 4.4 oz)  Protein Requirements: 66-78 g/d    Fluid Need Method: RDA Method, other (see comments) (1 mL/kcal or per MD)    Monitor and Evaluation    Food and Nutrient Intake: energy intake, food and beverage intake, enteral nutrition intake  Food and Nutrient Adminstration: diet order, enteral and parenteral nutrition administration     Physical Activity and Function: nutrition-related ADLs and IADLs  Anthropometric Measurements: weight, weight change, body mass index  Biochemical Data, Medical Tests and Procedures: electrolyte and renal panel, gastrointestinal profile, glucose/endocrine profile, inflammatory profile, lipid profile  Nutrition-Focused Physical Findings: overall appearance    Nutrition Risk    Level of Risk: moderate    Nutrition Follow-Up    RD Follow-up?: Yes    Assessment and Plan    Inadequate energy intake r/t inability to consume sufficient energy AEB NPO with no alternate means of nutrition.  Status: Resolved

## 2017-03-10 NOTE — SUBJECTIVE & OBJECTIVE
Neurologic Chief Complaint: L MCA stroke     Subjective:     Interval History: Patient is seen for follow-up neurological assessment and treatment recommendations: HUNTER, in a fib with RVR overnight that resolved with IV metoprolol, diltiazem dose was increased      HPI, Past Medical, Family, and Social History remains the same as documented in the initial encounter.     Review of Systems   Constitutional: Negative for fever.   HENT: Negative for drooling.    Eyes: Negative for redness.   Respiratory: Negative for cough and shortness of breath.    Cardiovascular: Negative for leg swelling.        (+) tachycardia   Gastrointestinal: Negative for vomiting.   Musculoskeletal: Negative for joint swelling.   Skin: Negative for rash.   Neurological: Positive for facial asymmetry, speech difficulty and weakness.   Psychiatric/Behavioral: Negative for agitation.     Scheduled Meds:   albuterol-ipratropium 2.5mg-0.5mg/3mL  3 mL Nebulization Q8H    aspirin  81 mg Per NG tube Daily    atorvastatin  40 mg Per NG tube Daily    B-complex with vitamin C  1 tablet Per NG tube Daily    calcium carbonate  500 mg Per NG tube BID WM    diltiaZEM  60 mg Per NG tube Q8H    heparin (porcine)  5,000 Units Subcutaneous Q8H    hydrochlorothiazide  25 mg Per NG tube Daily    levothyroxine  25 mcg Per NG tube Before breakfast    propranolol  10 mg Per NG tube BID    senna-docusate 8.6-50 mg  1 tablet Per NG tube BID    sodium chloride 0.9%  10 mL Intravenous Q6H    sodium chloride 0.9%  3 mL Intravenous Q8H    sodium chloride 3%  250 mL Intravenous Once    vitamin D  1,000 Units Per NG tube Daily     Continuous Infusions:   sodium chloride 0.9% 50 mL/hr at 03/10/17 1405     PRN Meds:acetaminophen, hydrALAZINE, flu vacc [id number] 65yr up(PF), labetalol, magnesium sulfate IVPB, magnesium sulfate IVPB, pneumoc 13-evette conj-dip cr(PF), potassium chloride **AND** potassium chloride **AND** potassium chloride, Flushing PICC Protocol  **AND** sodium chloride 0.9% **AND** sodium chloride 0.9%, sodium phosphate IVPB, sodium phosphate IVPB, sodium phosphate IVPB    Objective:     Vital Signs (Most Recent):  Temp: 97.7 °F (36.5 °C) (03/10/17 1105)  Pulse: 76 (03/10/17 1546)  Resp: (!) 21 (03/10/17 1546)  BP: (!) 142/69 (03/10/17 1205)  SpO2: 96 % (03/10/17 1405)  BP Location: Right arm    Vital Signs Range (Last 24H):  Temp:  [97.7 °F (36.5 °C)-99.3 °F (37.4 °C)]   Pulse:  []   Resp:  [17-35]   BP: (116-175)/(67-84)   SpO2:  [94 %-100 %]   Arterial Line BP: (127-167)/(50-76)   BP Location: Right arm    Physical Exam   Constitutional: She appears well-developed and well-nourished.   HENT:   Head: Normocephalic.   Eyes: Pupils are equal, round, and reactive to light.   Gaze left    Cardiovascular: Normal rate.    In and out of a fib     Pulmonary/Chest: Effort normal. No respiratory distress.   Abdominal: Soft. She exhibits no distension.   Neurological:   Drowsy   Skin: Skin is warm.   Nursing note and vitals reviewed.      Neurological Exam:   LOC: does not follow requests and drowsy  Language: Global aphasia  Speech: Aphasic   Orientation: Aphasic  EOM (CN III, IV, VI): Gaze preference left  Facial Movement (CN VII): lower weakness right lower  Motor*: Arm Left:  Paretic:  4/5, Leg Left:   Paretic:  4/5, Arm Right:   Plegic (0/5), Leg Right:   Plegic (0/5)  Sensation: caesar-hypoesthesia right    NIH Stroke Scale:    Level of Consciousness: 1 - drowsy  LOC Questions: 2 - answers none correctly  LOC Commands: 2 - performs neither correctly  Best Gaze: 1 - partial gaze palsy  Visual: 2 - complete hemianopia  Facial Palsy: 1 - minor  Motor Left Arm: 1 - drift  Motor Right Arm: 4 - no movement  Motor Left Le - no drift  Motor Right Le - no movement  Limb Ataxia: 0 - absent  Sensory: 1 - mild to moderate loss  Best Language: 3 - mute  Dysarthria: 0 - normal articulation  Extinction and Inattention: 0 - no neglect  NIH Stroke Scale Total:  22      Laboratory:  CMP:     Recent Labs  Lab 03/10/17  0204  03/10/17  1032   CALCIUM 8.1*  --   --    ALBUMIN 2.7*  --   --    PROT 6.4  --   --    *  < > 150*   K 4.1  --   --    CO2 20*  --   --    *  --   --    BUN 23  --   --    CREATININE 0.8  --   --    ALKPHOS 40*  --   --    ALT 22  --   --    AST 42*  --   --    BILITOT 0.8  --   --    < > = values in this interval not displayed.  CBC:     Recent Labs  Lab 03/10/17  0204   WBC 10.62   RBC 3.17*   HGB 10.4*   HCT 32.1*   *   *   MCH 32.8*   MCHC 32.4     Lipid Panel:     Recent Labs  Lab 03/07/17  0527   CHOL 120   LDLCALC 68.2   HDL 40   TRIG 59     Coagulation:     Recent Labs  Lab 03/07/17  0749   INR 1.1   APTT 21.2     Platelet Aggregation Study: No results for input(s): PLTAGG, PLTAGINTERP, PLTAGREGLACO, ADPPLTAGGREG in the last 168 hours.  Hgb A1C:     Recent Labs  Lab 03/07/17 0527   HGBA1C 5.9     TSH:     Recent Labs  Lab 03/07/17 0527   TSH 0.109*       Diagnostic Results:  I have personally reviewed  CT head. Date: 3/7/17  Temporal evolution of large left MCA territory  acute infarction with mass effect on the underlying parenchyma, sulci and left lateral ventricle with a 0.3 cm rightward midline shift.  No evidence of hydrocephalus or intracranial hemorrhage in this patient status post tPA.  Continued followup is recommended.     CTA stroke multiphase. Date: 3/7/17  -L M1 stenosis  -R vert narrowing    Echo. Date: 3/7/17  Moderately enlarged LA  CONCLUSIONS     1 - Concentric remodeling.     2 - Normal left ventricular systolic function (EF 60-65%).     3 - Right ventricular enlargement with moderately depressed systolic function.     4 - Left ventricular diastolic dysfunction.     5 - Biatrial enlargement.     6 - Mild aortic stenosis, MATEO = 1.6 cm2, peak velocity = 2.0 m/s, mean gradient = 10 mmHg.     7 - Severe tricuspid regurgitation.     8 - Pulmonary hypertension. The estimated PA systolic pressure is 102  mmHg.     9 - Increased central venous pressure.

## 2017-03-10 NOTE — PLAN OF CARE
Problem: Physical Therapy Goal  Goal: Physical Therapy Goal  Goals to be met by: 3/21/17     Patient will increase functional independence with mobility by performing:    Supine to sit with Moderate Assistance.  Sit to supine with Moderate Assistance.   Sit to stand transfer with Moderate Assistance using No Assistive Device.  Bed to chair transfer via Squat Pivot with Moderate Assistance using No Assistive Device.  Static sitting at edge of bed x 15 minutes with BUE support and BLE support with Minimal Assistance.  Able to tolerate exercise for 15-20 reps with assistance as needed.  Patient and family able to teachback stroke & positioning education independently.     Outcome: Ongoing (interventions implemented as appropriate)  Pt would benefit from SNF in [place] upon discharge. Pt progressing well towards goals.  Josee Cain PT, DPT  3/10/2017  [phone]

## 2017-03-10 NOTE — PT/OT/SLP PROGRESS
Physical Therapy  Treatment/ Change in POC    Zena Aguirre   MRN: [id number]   Admitting Diagnosis: Acute ischemic left MCA stroke    PT Received On: 03/10/17  PT Start Time: 1002     PT Stop Time: 1024    PT Total Time (min): 22 min       Billable Minutes:  Therapeutic Exercise 22    Treatment Type: Treatment  PT/PTA: PT       General Precautions: Standard, aphasia, aspiration, fall, seizure, NPO  Orthopedic Precautions: N/A   Braces: N/A    Subjective:  Communicated with RAVI Cohen prior to session.  Pt nonverbal, sleeping throughout session.  Pt's  and son present.    Pain Rating:  (no s/s of distress noted)    Objective:   Patient found with: arterial line, blood pressure cuff, sesay catheter, NG tube, peripheral IV, pulse ox (continuous), SCD, telemetry    Functional Mobility:  Bed Mobility:   Defferred due to tachycardia (130-140s), A-fib and increased RR (23-27) while supine    Therapeutic Activities and Exercises:  Supine: BLE PROM x 15 reps in all planes through available ROM. Exercises performed to develop and maintain pt's  ROM.     Education:  Patient provided with daily orientation and goals of this PT session. Patient and family agreed to participate in session.Patient and family aware of patient's deficits and therapy progression. They were provided and educated on proper positioning in supine and in sitting with support of affected RUE extremities in order to increase awareness of extremity and to decrease the effects of immobility, specifically edema and pain. Time provided for therapeutic counseling and discussion of health disposition. All questions answered to patient's & and family's satisfaction, within scope of PT practice; voiced no other concerns. White board updated in patient's room, RN notified of session.    AM-PAC 6 CLICK MOBILITY  How much help from another person does this patient currently need?   1 = Unable, Total/Dependent Assistance  2 = A lot, Maximum/Moderate  Assistance  3 = A little, Minimum/Contact Guard/Supervision  4 = None, Modified [place]/Independent    Turning over in bed (including adjusting bedclothes, sheets and blankets)?: 1  Sitting down on and standing up from a chair with arms (e.g., wheelchair, bedside commode, etc.): 1  Moving from lying on back to sitting on the side of the bed?: 1  Moving to and from a bed to a chair (including a wheelchair)?: 1  Need to walk in hospital room?: 1  Climbing 3-5 steps with a railing?: 1  Total Score: 6    AM-PAC Raw Score CMS G-Code Modifier Level of Impairment Assistance   6 % Total / Unable   7 - 9 CM 80 - 100% Maximal Assist   10 - 14 CL 60 - 80% Moderate Assist   15 - 19 CK 40 - 60% Moderate Assist   20 - 22 CJ 20 - 40% Minimal Assist   23 CI 1-20% SBA / CGA   24 CH 0% Independent/ Mod I     Patient left supine with all lines intact, call button in reach, RN notified and family present.    Assessment:  Zena Aguirre is a 81 y.o. female with a medical diagnosis of Acute ischemic left MCA stroke. Pt continues to require significant assist with all mobility and alertness. Ms. Aguirre's deficits affect their ability to safely and independently participate in self-care tasks and functional mobility. Due to her physical therapy diagnosis of debility and deconditioning, they continue to benefit from acute PT services to address the following limitations: high fall risk, weakness, instability, the need for supervised instructions of exercise, and the decreased ability to perform functional activities which they were able to complete PTA. Education was provided to patient & family regarding importance of continued participation in therapy, patient's progress and discharge disposition. Pt would benefit from SNF in NH upon discharge to improve quality of life and focus on recovery of impairments.     Rehab identified problem list/impairments: Rehab identified problem list/impairments: weakness, impaired endurance,  impaired sensation, impaired self care skills, impaired functional mobilty, gait instability, impaired balance, visual deficits, impaired cognition, decreased upper extremity function, decreased lower extremity function, decreased safety awareness    Rehab potential is fair.    Activity tolerance: Fair    Discharge recommendations: Discharge Facility/Level Of Care Needs: nursing facility, skilled     Barriers to discharge: Barriers to Discharge: Decreased caregiver support, Inaccessible home environment    Equipment recommendations: Equipment Needed After Discharge: hospital bed     GOALS:   Physical Therapy Goals        Problem: Physical Therapy Goal    Goal Priority Disciplines Outcome Goal Variances Interventions   Physical Therapy Goal     PT/OT, PT Ongoing (interventions implemented as appropriate)     Description:  Goals to be met by: 3/21/17     Patient will increase functional independence with mobility by performing:    Supine to sit with Moderate Assistance.  Sit to supine with Moderate Assistance.   Sit to stand transfer with Moderate Assistance using No Assistive Device.  Bed to chair transfer via Squat Pivot with Moderate Assistance using No Assistive Device.  Static sitting at edge of bed x 15 minutes with BUE support and BLE support with Minimal Assistance.  Able to tolerate exercise for 15-20 reps with assistance as needed.  Patient and family able to teachback stroke & positioning education independently.                  PLAN:    Patient to be seen 5 x/week  to address the above listed problems via gait training, therapeutic activities, therapeutic exercises, neuromuscular re-education  Plan of Care expires: 04/07/17  Plan of Care reviewed with: patient, spouse, son     Josee THOMAS Ant PT, DPT  3/10/2017  [phone]

## 2017-03-11 LAB
ALBUMIN SERPL BCP-MCNC: 2.7 G/DL
ALLENS TEST: ABNORMAL
ALP SERPL-CCNC: 42 U/L
ALT SERPL W/O P-5'-P-CCNC: 18 U/L
ANION GAP SERPL CALC-SCNC: 9 MMOL/L
AST SERPL-CCNC: 37 U/L
BASOPHILS # BLD AUTO: 0.01 K/UL
BASOPHILS NFR BLD: 0.1 %
BILIRUB SERPL-MCNC: 0.5 MG/DL
BUN SERPL-MCNC: 26 MG/DL
CALCIUM SERPL-MCNC: 7.8 MG/DL
CHLORIDE SERPL-SCNC: 118 MMOL/L
CO2 SERPL-SCNC: 20 MMOL/L
CREAT SERPL-MCNC: 0.7 MG/DL
DELSYS: ABNORMAL
DIFFERENTIAL METHOD: ABNORMAL
EOSINOPHIL # BLD AUTO: 0.1 K/UL
EOSINOPHIL NFR BLD: 0.5 %
ERYTHROCYTE [DISTWIDTH] IN BLOOD BY AUTOMATED COUNT: 14.5 %
EST. GFR  (AFRICAN AMERICAN): >60 ML/MIN/1.73 M^2
EST. GFR  (NON AFRICAN AMERICAN): >60 ML/MIN/1.73 M^2
FIO2: 21
GLUCOSE SERPL-MCNC: 141 MG/DL
HCO3 UR-SCNC: 21.4 MMOL/L (ref 24–28)
HCT VFR BLD AUTO: 33.2 %
HGB BLD-MCNC: 10.7 G/DL
LYMPHOCYTES # BLD AUTO: 2.3 K/UL
LYMPHOCYTES NFR BLD: 16.8 %
MAGNESIUM SERPL-MCNC: 1.5 MG/DL
MAGNESIUM SERPL-MCNC: 2.2 MG/DL
MCH RBC QN AUTO: 32.9 PG
MCHC RBC AUTO-ENTMCNC: 32.2 %
MCV RBC AUTO: 102 FL
MODE: ABNORMAL
MONOCYTES # BLD AUTO: 1.4 K/UL
MONOCYTES NFR BLD: 10.3 %
NEUTROPHILS # BLD AUTO: 9.6 K/UL
NEUTROPHILS NFR BLD: 72 %
PCO2 BLDA: 30.8 MMHG (ref 35–45)
PH SMN: 7.45 [PH] (ref 7.35–7.45)
PHOSPHATE SERPL-MCNC: 1.6 MG/DL
PHOSPHATE SERPL-MCNC: 3.5 MG/DL
PLATELET # BLD AUTO: 140 K/UL
PMV BLD AUTO: 10.1 FL
PO2 BLDA: 73 MMHG (ref 80–100)
POC BE: -3 MMOL/L
POC SATURATED O2: 95 % (ref 95–100)
POC TCO2: 22 MMOL/L (ref 23–27)
POCT GLUCOSE: 128 MG/DL (ref 70–110)
POCT GLUCOSE: 137 MG/DL (ref 70–110)
POCT GLUCOSE: 142 MG/DL (ref 70–110)
POCT GLUCOSE: 143 MG/DL (ref 70–110)
POCT GLUCOSE: 154 MG/DL (ref 70–110)
POTASSIUM SERPL-SCNC: 3.1 MMOL/L
POTASSIUM SERPL-SCNC: 3.6 MMOL/L
POTASSIUM SERPL-SCNC: 4 MMOL/L
PROT SERPL-MCNC: 6.4 G/DL
RBC # BLD AUTO: 3.25 M/UL
SAMPLE: ABNORMAL
SITE: ABNORMAL
SODIUM SERPL-SCNC: 140 MMOL/L
SODIUM SERPL-SCNC: 144 MMOL/L
SODIUM SERPL-SCNC: 146 MMOL/L
SODIUM SERPL-SCNC: 146 MMOL/L
SODIUM SERPL-SCNC: 147 MMOL/L
SP02: 96
WBC # BLD AUTO: 13.38 K/UL

## 2017-03-11 PROCEDURE — 84132 ASSAY OF SERUM POTASSIUM: CPT

## 2017-03-11 PROCEDURE — 99233 SBSQ HOSP IP/OBS HIGH 50: CPT | Mod: ,,, | Performed by: PSYCHIATRY & NEUROLOGY

## 2017-03-11 PROCEDURE — 84295 ASSAY OF SERUM SODIUM: CPT | Mod: 91

## 2017-03-11 PROCEDURE — 83735 ASSAY OF MAGNESIUM: CPT | Mod: 91

## 2017-03-11 PROCEDURE — 84132 ASSAY OF SERUM POTASSIUM: CPT | Mod: 91

## 2017-03-11 PROCEDURE — 25000242 PHARM REV CODE 250 ALT 637 W/ HCPCS: Performed by: PHYSICIAN ASSISTANT

## 2017-03-11 PROCEDURE — 25000003 PHARM REV CODE 250: Performed by: PHYSICIAN ASSISTANT

## 2017-03-11 PROCEDURE — 84100 ASSAY OF PHOSPHORUS: CPT

## 2017-03-11 PROCEDURE — 84100 ASSAY OF PHOSPHORUS: CPT | Mod: 91

## 2017-03-11 PROCEDURE — 85025 COMPLETE CBC W/AUTO DIFF WBC: CPT

## 2017-03-11 PROCEDURE — 20000000 HC ICU ROOM

## 2017-03-11 PROCEDURE — 25000003 PHARM REV CODE 250: Performed by: PSYCHIATRY & NEUROLOGY

## 2017-03-11 PROCEDURE — 63600175 PHARM REV CODE 636 W HCPCS: Performed by: PHYSICIAN ASSISTANT

## 2017-03-11 PROCEDURE — 94640 AIRWAY INHALATION TREATMENT: CPT

## 2017-03-11 PROCEDURE — 37799 UNLISTED PX VASCULAR SURGERY: CPT

## 2017-03-11 PROCEDURE — 94667 MNPJ CHEST WALL 1ST: CPT

## 2017-03-11 PROCEDURE — 83735 ASSAY OF MAGNESIUM: CPT

## 2017-03-11 PROCEDURE — 82803 BLOOD GASES ANY COMBINATION: CPT

## 2017-03-11 PROCEDURE — 99900035 HC TECH TIME PER 15 MIN (STAT)

## 2017-03-11 PROCEDURE — 80053 COMPREHEN METABOLIC PANEL: CPT

## 2017-03-11 PROCEDURE — 94668 MNPJ CHEST WALL SBSQ: CPT

## 2017-03-11 PROCEDURE — 99233 SBSQ HOSP IP/OBS HIGH 50: CPT | Mod: ,,, | Performed by: PHYSICIAN ASSISTANT

## 2017-03-11 RX ORDER — CIPROFLOXACIN 2 MG/ML
400 INJECTION, SOLUTION INTRAVENOUS EVERY 8 HOURS
Status: DISCONTINUED | OUTPATIENT
Start: 2017-03-11 | End: 2017-03-14

## 2017-03-11 RX ADMIN — SODIUM PHOSPHATE, MONOBASIC, MONOHYDRATE 20.01 MMOL: 276; 142 INJECTION, SOLUTION INTRAVENOUS at 01:03

## 2017-03-11 RX ADMIN — IPRATROPIUM BROMIDE AND ALBUTEROL SULFATE 3 ML: .5; 3 SOLUTION RESPIRATORY (INHALATION) at 12:03

## 2017-03-11 RX ADMIN — STANDARDIZED SENNA CONCENTRATE AND DOCUSATE SODIUM 1 TABLET: 8.6; 5 TABLET, FILM COATED ORAL at 08:03

## 2017-03-11 RX ADMIN — Medication 3 ML: at 02:03

## 2017-03-11 RX ADMIN — DILTIAZEM HYDROCHLORIDE 60 MG: 60 TABLET, FILM COATED ORAL at 05:03

## 2017-03-11 RX ADMIN — CIPROFLOXACIN 400 MG: 2 INJECTION, SOLUTION INTRAVENOUS at 02:03

## 2017-03-11 RX ADMIN — MAGNESIUM SULFATE IN WATER 2 G: 40 INJECTION, SOLUTION INTRAVENOUS at 02:03

## 2017-03-11 RX ADMIN — LEVOTHYROXINE SODIUM 25 MCG: 25 TABLET ORAL at 05:03

## 2017-03-11 RX ADMIN — DILTIAZEM HYDROCHLORIDE 60 MG: 60 TABLET, FILM COATED ORAL at 02:03

## 2017-03-11 RX ADMIN — ATORVASTATIN CALCIUM 40 MG: 20 TABLET, FILM COATED ORAL at 08:03

## 2017-03-11 RX ADMIN — VITAMIN C 1 TABLET: TAB at 08:03

## 2017-03-11 RX ADMIN — CIPROFLOXACIN 400 MG: 2 INJECTION, SOLUTION INTRAVENOUS at 10:03

## 2017-03-11 RX ADMIN — PROPRANOLOL HYDROCHLORIDE 10 MG: 10 TABLET ORAL at 08:03

## 2017-03-11 RX ADMIN — Medication 10 ML: at 06:03

## 2017-03-11 RX ADMIN — ASPIRIN 81 MG CHEWABLE TABLET 81 MG: 81 TABLET CHEWABLE at 08:03

## 2017-03-11 RX ADMIN — POTASSIUM CHLORIDE 60 MEQ: 200 INJECTION, SOLUTION INTRAVENOUS at 03:03

## 2017-03-11 RX ADMIN — CALCIUM 500 MG: 500 TABLET ORAL at 04:03

## 2017-03-11 RX ADMIN — DILTIAZEM HYDROCHLORIDE 60 MG: 60 TABLET, FILM COATED ORAL at 10:03

## 2017-03-11 RX ADMIN — Medication 3 ML: at 10:03

## 2017-03-11 RX ADMIN — HEPARIN SODIUM 5000 UNITS: 5000 INJECTION, SOLUTION INTRAVENOUS; SUBCUTANEOUS at 02:03

## 2017-03-11 RX ADMIN — STANDARDIZED SENNA CONCENTRATE AND DOCUSATE SODIUM 1 TABLET: 8.6; 5 TABLET, FILM COATED ORAL at 09:03

## 2017-03-11 RX ADMIN — IPRATROPIUM BROMIDE AND ALBUTEROL SULFATE 3 ML: .5; 3 SOLUTION RESPIRATORY (INHALATION) at 08:03

## 2017-03-11 RX ADMIN — IPRATROPIUM BROMIDE AND ALBUTEROL SULFATE 3 ML: .5; 3 SOLUTION RESPIRATORY (INHALATION) at 04:03

## 2017-03-11 RX ADMIN — POTASSIUM CHLORIDE 40 MEQ: 400 INJECTION, SOLUTION INTRAVENOUS at 04:03

## 2017-03-11 RX ADMIN — VITAMIN D, TAB 1000IU (100/BT) 1000 UNITS: 25 TAB at 08:03

## 2017-03-11 RX ADMIN — HEPARIN SODIUM 5000 UNITS: 5000 INJECTION, SOLUTION INTRAVENOUS; SUBCUTANEOUS at 10:03

## 2017-03-11 RX ADMIN — PROPRANOLOL HYDROCHLORIDE 10 MG: 10 TABLET ORAL at 09:03

## 2017-03-11 RX ADMIN — HYDROCHLOROTHIAZIDE 25 MG: 12.5 TABLET ORAL at 08:03

## 2017-03-11 RX ADMIN — Medication 10 ML: at 12:03

## 2017-03-11 RX ADMIN — HEPARIN SODIUM 5000 UNITS: 5000 INJECTION, SOLUTION INTRAVENOUS; SUBCUTANEOUS at 05:03

## 2017-03-11 RX ADMIN — CALCIUM 500 MG: 500 TABLET ORAL at 08:03

## 2017-03-11 RX ADMIN — Medication: at 05:03

## 2017-03-11 NOTE — PROGRESS NOTES
ICU Progress Note  Neurocritical Care    Admit Date: 3/7/2017  LOS: 4    CC: Embolic stroke involving left middle cerebral artery    Code Status: DNR     SUBJECTIVE:     Interval History/Significant Events: Acute embolic L  M1 occlusion and L MCA terriotry  infarct,  admitted on 3/7 after receiving IV tPa, no thrombectomy due to CT  changes on early imaging.   -- Repeat imaging at 24 hours revealed enlarged area of cytotoxic edema with mass effect- follow stable and exam continues to improve since hypertonic therapy. More alert today. Will repeat imaging tomorrow.         Review of Symptoms:   Nanci due to aphasia    Medications:  Continuous Infusions:   sodium chloride 0.9% 50 mL/hr at 03/11/17 1200     Scheduled Meds:   albuterol-ipratropium 2.5mg-0.5mg/3mL  3 mL Nebulization Q8H    aspirin  81 mg Per NG tube Daily    atorvastatin  40 mg Per NG tube Daily    B-complex with vitamin C  1 tablet Per NG tube Daily    calcium carbonate  500 mg Per NG tube BID WM    diltiaZEM  60 mg Per NG tube Q8H    heparin (porcine)  5,000 Units Subcutaneous Q8H    hydrochlorothiazide  25 mg Per NG tube Daily    levothyroxine  25 mcg Per NG tube Before breakfast    propranolol  10 mg Per NG tube BID    senna-docusate 8.6-50 mg  1 tablet Per NG tube BID    sodium chloride 0.9%  10 mL Intravenous Q6H    sodium chloride 0.9%  3 mL Intravenous Q8H    sodium chloride 3%  250 mL Intravenous Once    vitamin D  1,000 Units Per NG tube Daily     PRN Meds:.acetaminophen, hydrALAZINE, flu vacc [id number] 65yr up(PF), labetalol, magnesium sulfate IVPB, magnesium sulfate IVPB, pneumoc 13-evette conj-dip cr(PF), potassium chloride **AND** potassium chloride **AND** potassium chloride, Flushing PICC Protocol **AND** sodium chloride 0.9% **AND** sodium chloride 0.9%, sodium phosphate IVPB, sodium phosphate IVPB, sodium phosphate IVPB    OBJECTIVE:   Vital Signs (Most Recent):   Temp: 99.1 °F (37.3 °C) (03/11/17 1100)  Pulse: 80 (03/11/17  1200)  Resp: 20 (03/11/17 1200)  BP: (!) 162/91 (03/11/17 1200)  SpO2: 98 % (03/11/17 1200)    Vital Signs (24h Range):   Temp:  [97.4 °F (36.3 °C)-99.1 °F (37.3 °C)] 99.1 °F (37.3 °C)  Pulse:  [] 80  Resp:  [17-26] 20  SpO2:  [88 %-100 %] 98 %  BP: (117-184)/() 162/91  Arterial Line BP: (136-181)/(55-85) 165/68    ICP/CPP (Last 24h):        I & O (Last 24h):     Intake/Output Summary (Last 24 hours) at 03/11/17 1243  Last data filed at 03/11/17 1200   Gross per 24 hour   Intake             2945 ml   Output             1905 ml   Net             1040 ml     Physical Exam:  GA: Alert, comfortable, no acute distress.   HEENT: No scleral icterus or JVD.   Pulmonary: bibasilar wheezes, tolerating RA   Cardiac: RRR S1 & S2 w/o rubs/murmurs/gallops.   Abdominal: Bowel sounds present x 4. No appreciable hepatosplenomegaly.  Skin: No jaundice, rashes, or visible lesions.  Neuro:  --GCS: E4 V1 M5  --Mental Status: awake, alert, aphasic  --L gaze preference   --Pupils 3 mm, PERRL.   --Corneal reflex, gag, cough intact.  --RUE flexion to pain, RLE withdraws to pain  --moves Left side spontaneously  --does not follow verbal commands       Lines/Drains/Airway:          PICC Double Lumen 03/08/17 1208 left brachial (Active)   Site Assessment Clean;Dry;Intact 3/9/2017  7:05 AM   Lumen 1 Status Infusing 3/9/2017  7:05 AM   Lumen 2 Status Infusing 3/9/2017  7:05 AM   Length verónica (cm) 36 cm 3/8/2017 12:05 PM   Current Exposed Catheter (cm) 0 cm 3/8/2017 12:05 PM   Extremity Circumference (cm) 25 cm 3/8/2017 12:05 PM   Dressing Type Transparent 3/9/2017  7:05 AM   Dressing Status Biopatch in place;Clean;Dry;Intact 3/9/2017  7:05 AM   Dressing Intervention Dressing reinforced 3/9/2017  7:05 AM   Dressing Change Due 03/15/17 3/9/2017  7:05 AM   Daily Line Review Performed 3/9/2017  7:05 AM           Arterial Line 03/08/17 1530 Right Radial (Active)   Site Assessment Clean;Dry;Intact 3/9/2017  7:05 AM   Line Status Pulsatile  "blood flow 3/9/2017  7:05 AM   Art Line Waveform Appropriate;Square wave test performed 3/9/2017  7:05 AM   Arterial Line Interventions Zeroed and calibrated;Leveled;Connections checked and tightened;Flushed per protocol 3/9/2017  7:05 AM   Color/Movement/Sensation Capillary refill less than 3 sec 3/9/2017  7:05 AM   Dressing Type Transparent 3/9/2017  7:05 AM   Dressing Status Biopatch in place;Clean;Dry;Intact 3/9/2017  7:05 AM   Dressing Intervention Dressing reinforced 3/9/2017  7:05 AM   Dressing Change Due 03/15/17 3/9/2017  7:05 AM           NG/OG Tube 03/08/17 0615 Anthony sump 14 Fr. Right nostril (Active)   Placement Check placement verified by distal tube length measurement;placement verified by x-ray 3/9/2017  7:05 AM   Distal Tube Length (cm) 63 3/9/2017  7:05 AM   Clamp Status/Tolerance clamped;no abdominal discomfort;no abdominal distention;no emesis;no nausea;no residual;no restlessness 3/9/2017  7:05 AM   Flush/Irrigation flushed w/;water;no resistance met 3/9/2017  3:05 AM   Intake (mL) 100 mL 3/9/2017  8:00 AM   Residual Amount (ml) 0 ml 3/8/2017 11:03 PM            Urethral Catheter 03/06/17 (Active)   Site Assessment Clean;Intact;Dry 3/9/2017  7:05 AM   Collection Container Urimeter 3/9/2017  7:05 AM   Securement Method secured to top of thigh w/ adhesive device 3/9/2017  7:05 AM   Catheter Care Performed yes 3/9/2017  7:05 AM   Reason for Continuing Urinary Catheterization Critically ill in ICU requiring intensive monitoring 3/9/2017  7:05 AM   CAUTI Prevention Bundle StatLock in place w 1" slack;Intact seal between catheter & drainage tubing;Drainage bag off the floor;Green sheeting clip in use;No dependent loops or kinks;Drainage bag not overfilled (<2/3 full);Drainage bag below bladder 3/9/2017  7:05 AM     Labs:  ABG:     Recent Labs  Lab 03/11/17  0442   PH 7.450   PO2 73*   PCO2 30.8*   HCO3 21.4*   POCSATURATED 95   BE -3     BMP:    Recent Labs  Lab 03/11/17  0114 03/11/17  0545 " 03/11/17  0954   * 146*  --    K 3.1*  --  3.6   *  --   --    CO2 20*  --   --    BUN 26*  --   --    CREATININE 0.7  --   --    *  --   --    MG 1.5*  --  2.2   PHOS 3.5  --  1.6*     LFT:   Lab Results   Component Value Date    AST 37 03/11/2017    ALT 18 03/11/2017    ALKPHOS 42 (L) 03/11/2017    BILITOT 0.5 03/11/2017    ALBUMIN 2.7 (L) 03/11/2017    PROT 6.4 03/11/2017     CBC:   Lab Results   Component Value Date    WBC 13.38 (H) 03/11/2017    HGB 10.7 (L) 03/11/2017    HCT 33.2 (L) 03/11/2017     (H) 03/11/2017     (L) 03/11/2017     Microbiology x 7d:   Microbiology Results (last 7 days)     ** No results found for the last 168 hours. **          ASSESSMENT/PLAN:     Patient Active Problem List   Diagnosis    Hypothyroidism    Essential hypertension    Paroxysmal atrial fibrillation    Hyperlipidemia    Embolic stroke involving left middle cerebral artery    Cytotoxic cerebral edema    Right flaccid hemiplegia    Pleural effusion, bilateral    Pulmonary hypertension    Brain compression      Neuro:   Acute embolic L M1 occlusion with large MCA territory infarct  --sp tpa ,no mechanical intervention  --worsening edema on CT on 3/8, hypertonic saline given for 2 days with adequate Na. Will discontinue and start NS- neuro exam stable.  --neurologic exam stable   --ASA started yesterday, continue, add sqh today.   --statin daily   --PT/OT  --will likely need PEG- f/u speech eval today     Pulmonary:   Atelectasis on CXR, wheezes bibasilar  --follow up CXR tomorrow  --monitor for secretions, chest PT to be started today, cough assist   --schedule duonebs  Elevated wbc, low grade fever- will start cipro x 8 days.   Cardiac:   Pulmonary HTN, Tricuspid regurgitation, newly diagnosed Afib  --euvolemia , diltiazem PO- dose increased.     Renal:    Iatrogenic hypernatremia  --146, monitor q 6 to ensure stable decline  --BUN Cr wnl  --urine output monitoring  --NS at 50    ID:    Pneumonia possibly, start cipro.     Hem/Onc:   H/h, plt wnl, stable  sqh  Held until tomorrow.     Endocrine:    SSI    Fluids/Electrolytes/Nutrition/GI:   isosource    PPx- starting sqh today     Uninterrupted Critical Care/Counseling Time (not including procedures): 48 minutes        Cele Huang PA-C  Neurocritical Care  [facility]  Pager/[employer] #[phone]

## 2017-03-11 NOTE — PLAN OF CARE
Problem: Patient Care Overview  Goal: Plan of Care Review  Outcome: Ongoing (interventions implemented as appropriate)  POC reviewed with pt and family at 1400. Questions and concerns addressed. No acute events today. Pt progressing toward goals. Electrolytes replaced, 99.1 degree fever, started on Cipro for possible lung infection,  CXR, CPT, cough assist ordered, plan for follow up CT of head tomorrow. Will continue to monitor. See flowsheets for full assessment and VS info.

## 2017-03-11 NOTE — PROGRESS NOTES
[facility]  Vascular Neurology  Comprehensive Stroke Center  Progress Note      Neurologic Chief Complaint: L MCA stroke     Subjective:     Interval History: Patient is seen for follow-up neurological assessment and treatment recommendations: NAEON, neurologically stable and more alert today      HPI, Past Medical, Family, and Social History remains the same as documented in the initial encounter.     Review of Systems   Constitutional: Negative for fever.   HENT: Negative for drooling.    Eyes: Negative for redness.   Respiratory: Negative for cough and shortness of breath.    Cardiovascular: Negative for leg swelling.        (+) tachycardia   Gastrointestinal: Negative for vomiting.   Musculoskeletal: Negative for joint swelling.   Skin: Negative for rash.   Neurological: Positive for facial asymmetry, speech difficulty and weakness.   Psychiatric/Behavioral: Negative for agitation.     Scheduled Meds:   albuterol-ipratropium 2.5mg-0.5mg/3mL  3 mL Nebulization Q8H    aspirin  81 mg Per NG tube Daily    atorvastatin  40 mg Per NG tube Daily    B-complex with vitamin C  1 tablet Per NG tube Daily    calcium carbonate  500 mg Per NG tube BID WM    diltiaZEM  60 mg Per NG tube Q8H    heparin (porcine)  5,000 Units Subcutaneous Q8H    hydrochlorothiazide  25 mg Per NG tube Daily    levothyroxine  25 mcg Per NG tube Before breakfast    propranolol  10 mg Per NG tube BID    senna-docusate 8.6-50 mg  1 tablet Per NG tube BID    sodium chloride 0.9%  10 mL Intravenous Q6H    sodium chloride 0.9%  3 mL Intravenous Q8H    sodium chloride 3%  250 mL Intravenous Once    vitamin D  1,000 Units Per NG tube Daily     Continuous Infusions:   sodium chloride 0.9% 50 mL/hr at 03/11/17 1200     PRN Meds:acetaminophen, hydrALAZINE, flu vacc [id number] 65yr up(PF), labetalol, magnesium sulfate IVPB, magnesium sulfate IVPB, pneumoc 13-evette conj-dip cr(PF), potassium chloride **AND** potassium chloride  **AND** potassium chloride, Flushing PICC Protocol **AND** sodium chloride 0.9% **AND** sodium chloride 0.9%, sodium phosphate IVPB, sodium phosphate IVPB, sodium phosphate IVPB    Objective:     Vital Signs (Most Recent):  Temp: 99.1 °F (37.3 °C) (17 1100)  Pulse: 80 (17 1200)  Resp: 20 (17 1200)  BP: (!) 162/91 (17 1200)  SpO2: 98 % (17 1200)  BP Location: Right arm    Vital Signs Range (Last 24H):  Temp:  [97.4 °F (36.3 °C)-99.1 °F (37.3 °C)]   Pulse:  []   Resp:  [17-26]   BP: (117-184)/()   SpO2:  [88 %-100 %]   Arterial Line BP: (136-181)/(55-85)   BP Location: Right arm    Physical Exam   Constitutional: She appears well-developed and well-nourished.   HENT:   Head: Normocephalic.   Eyes: Pupils are equal, round, and reactive to light.   Gaze left    Cardiovascular: Normal rate.    In and out of a fib     Pulmonary/Chest: Effort normal. No respiratory distress.   Abdominal: Soft. She exhibits no distension.   Neurological:   Drowsy   Skin: Skin is warm and dry.   Nursing note and vitals reviewed.      Neurological Exam:   LOC: does not follow requests and drowsy  Language: Global aphasia  Speech: Aphasic   Orientation: Aphasic  EOM (CN III, IV, VI): Gaze preference left  Facial Movement (CN VII): lower weakness right lower  Motor*: Arm Left:  Paretic:  4/5, Leg Left:   Paretic:  4/5, Arm Right:   Plegic (0/5), Leg Right:   Plegic (0/5)  Sensation: caesar-hypoesthesia right    NIH Stroke Scale:    Level of Consciousness: 1 - drowsy  LOC Questions: 2 - answers none correctly  LOC Commands: 2 - performs neither correctly  Best Gaze: 1 - partial gaze palsy  Visual: 2 - complete hemianopia  Facial Palsy: 1 - minor  Motor Left Arm: 0 - no drift  Motor Right Arm: 4 - no movement  Motor Left Le - no drift  Motor Right Le - no movement  Limb Ataxia: 0 - absent  Sensory: 1 - mild to moderate loss  Best Language: 3 - mute  Dysarthria: 0 - normal articulation  Extinction  and Inattention: 0 - no neglect  NIH Stroke Scale Total: 21      Laboratory:  CMP:     Recent Labs  Lab 03/11/17  0114 03/11/17  0545 03/11/17  0954   CALCIUM 7.8*  --   --    ALBUMIN 2.7*  --   --    PROT 6.4  --   --    * 146*  --    K 3.1*  --  3.6   CO2 20*  --   --    *  --   --    BUN 26*  --   --    CREATININE 0.7  --   --    ALKPHOS 42*  --   --    ALT 18  --   --    AST 37  --   --    BILITOT 0.5  --   --      CBC:     Recent Labs  Lab 03/11/17  0114   WBC 13.38*   RBC 3.25*   HGB 10.7*   HCT 33.2*   *   *   MCH 32.9*   MCHC 32.2     Lipid Panel:     Recent Labs  Lab 03/07/17  0527   CHOL 120   LDLCALC 68.2   HDL 40   TRIG 59     Coagulation:     Recent Labs  Lab 03/07/17  0749   INR 1.1   APTT 21.2     Platelet Aggregation Study: No results for input(s): PLTAGG, PLTAGINTERP, PLTAGREGLACO, ADPPLTAGGREG in the last 168 hours.  Hgb A1C:     Recent Labs  Lab 03/07/17 0527   HGBA1C 5.9     TSH:     Recent Labs  Lab 03/07/17 0527   TSH 0.109*       Diagnostic Results:  I have personally reviewed  CT head. Date: 3/7/17  Temporal evolution of large left MCA territory  acute infarction with mass effect on the underlying parenchyma, sulci and left lateral ventricle with a 0.3 cm rightward midline shift.  No evidence of hydrocephalus or intracranial hemorrhage in this patient status post tPA.  Continued followup is recommended.     CTA stroke multiphase. Date: 3/7/17  -L M1 stenosis  -R vert narrowing    Echo. Date: 3/7/17  Moderately enlarged LA  CONCLUSIONS     1 - Concentric remodeling.     2 - Normal left ventricular systolic function (EF 60-65%).     3 - Right ventricular enlargement with moderately depressed systolic function.     4 - Left ventricular diastolic dysfunction.     5 - Biatrial enlargement.     6 - Mild aortic stenosis, MATEO = 1.6 cm2, peak velocity = 2.0 m/s, mean gradient = 10 mmHg.     7 - Severe tricuspid regurgitation.     8 - Pulmonary hypertension. The estimated PA  systolic pressure is 102 mmHg.     9 - Increased central venous pressure.          Assessment/Plan:     81 year old female with L MCA stroke with right hemiparesis, and aphasia. History of afib. Received tpa on 3/6/17  Discussion with NCC team regarding goals of care noted.       3/10/17 Patient was made DNR/DNI, discussed prognosis with family, patient has some movement in R side and would like to give her more time to recover before determining long term prognosis. Neurologically stable    3/11/17 Neurologically stable, more alert today      * Embolic stroke involving left middle cerebral artery  Likely cardioembolic in etiology, given recent Dx of afib not on anticoagulation.  Patient now dnr /dni - family considering goals of care    Antithrombotics for secondary stroke prevention: asa 81    Statins for secondary stroke prevention and hyperlipidemia, if present: Atorvastatin- 40 mg oral daily.     Aggressive risk factor modification: Hypertension, High Cholesterol, Diet, Exercise and Obesity, Rehab Efforts: Physical Therapy, Occupational Therapy, Speech and Language Pathology and Physical Medicine and Rehabilitation    Diagnostics: Ordered/Pending  MRI head without contrast to assess brain parenchyma    VTE Prophylaxis: heparin sq    BP Parameters: SBP <180    Nursing Orders: Neuro checks- q1h, Antiembolic stockings, Swallowing evaluation by Nursing, Out of bed BID, Avoid Mace catheter, Pneumatic compression device, Stroke Education, Blood glucose target 100-130, Up with assistance and IV Fluids: Per primary team    Cytotoxic cerebral edema  As seen on imaging from Acute ischemic stroke - L MCA     Hypothyroidism  Stroke RF  Continue synthroid    Essential hypertension  Stroke RF  SBP goal <180  Management per primary team    Paroxysmal atrial fibrillation  Stroke RF.  Likely etiology of patient's stroke.  Plan to start AC this admission.  Hold for now, given tPA and potential size of stroke and decision for  aggressive therapy   Treatment per primary team    Hyperlipidemia  Stroke risk factor  Goal LDL <70  LDL -68  Atorvastatin 40mg daily    Right flaccid hemiplegia  Due to infarct  Therapy following          Elana Chi PA-C  Comprehensive Stroke Center  Department of Vascular Neurology   [facility]

## 2017-03-11 NOTE — PLAN OF CARE
Problem: Patient Care Overview  Goal: Plan of Care Review  Outcome: Ongoing (interventions implemented as appropriate)  POC reviewed with pt at 0400. Pt unable to verbalize understanding. Questions and concerns addressed w/ family. No acute events overnight. Pt progressing toward goals. Will continue to monitor. See flowsheets for full assessment and VS info       Past Medical History:   Diagnosis Date    Essential hypertension 3/7/2017    Hyperlipidemia 3/7/2017    Hypothyroidism 3/7/2017    Paroxysmal atrial fibrillation 3/7/2017

## 2017-03-11 NOTE — SUBJECTIVE & OBJECTIVE
Neurologic Chief Complaint: L MCA stroke     Subjective:     Interval History: Patient is seen for follow-up neurological assessment and treatment recommendations: NAEON, neurologically stable and more alert today      HPI, Past Medical, Family, and Social History remains the same as documented in the initial encounter.     Review of Systems   Constitutional: Negative for fever.   HENT: Negative for drooling.    Eyes: Negative for redness.   Respiratory: Negative for cough and shortness of breath.    Cardiovascular: Negative for leg swelling.        (+) tachycardia   Gastrointestinal: Negative for vomiting.   Musculoskeletal: Negative for joint swelling.   Skin: Negative for rash.   Neurological: Positive for facial asymmetry, speech difficulty and weakness.   Psychiatric/Behavioral: Negative for agitation.     Scheduled Meds:   albuterol-ipratropium 2.5mg-0.5mg/3mL  3 mL Nebulization Q8H    aspirin  81 mg Per NG tube Daily    atorvastatin  40 mg Per NG tube Daily    B-complex with vitamin C  1 tablet Per NG tube Daily    calcium carbonate  500 mg Per NG tube BID WM    diltiaZEM  60 mg Per NG tube Q8H    heparin (porcine)  5,000 Units Subcutaneous Q8H    hydrochlorothiazide  25 mg Per NG tube Daily    levothyroxine  25 mcg Per NG tube Before breakfast    propranolol  10 mg Per NG tube BID    senna-docusate 8.6-50 mg  1 tablet Per NG tube BID    sodium chloride 0.9%  10 mL Intravenous Q6H    sodium chloride 0.9%  3 mL Intravenous Q8H    sodium chloride 3%  250 mL Intravenous Once    vitamin D  1,000 Units Per NG tube Daily     Continuous Infusions:   sodium chloride 0.9% 50 mL/hr at 03/11/17 1200     PRN Meds:acetaminophen, hydrALAZINE, flu vacc [id number] 65yr up(PF), labetalol, magnesium sulfate IVPB, magnesium sulfate IVPB, pneumoc 13-evette conj-dip cr(PF), potassium chloride **AND** potassium chloride **AND** potassium chloride, Flushing PICC Protocol **AND** sodium chloride 0.9% **AND** sodium  chloride 0.9%, sodium phosphate IVPB, sodium phosphate IVPB, sodium phosphate IVPB    Objective:     Vital Signs (Most Recent):  Temp: 99.1 °F (37.3 °C) (17 1100)  Pulse: 80 (17 1200)  Resp: 20 (17 1200)  BP: (!) 162/91 (17 1200)  SpO2: 98 % (17 1200)  BP Location: Right arm    Vital Signs Range (Last 24H):  Temp:  [97.4 °F (36.3 °C)-99.1 °F (37.3 °C)]   Pulse:  []   Resp:  [17-26]   BP: (117-184)/()   SpO2:  [88 %-100 %]   Arterial Line BP: (136-181)/(55-85)   BP Location: Right arm    Physical Exam   Constitutional: She appears well-developed and well-nourished.   HENT:   Head: Normocephalic.   Eyes: Pupils are equal, round, and reactive to light.   Gaze left    Cardiovascular: Normal rate.    In and out of a fib     Pulmonary/Chest: Effort normal. No respiratory distress.   Abdominal: Soft. She exhibits no distension.   Neurological:   Drowsy   Skin: Skin is warm and dry.   Nursing note and vitals reviewed.      Neurological Exam:   LOC: does not follow requests and drowsy  Language: Global aphasia  Speech: Aphasic   Orientation: Aphasic  EOM (CN III, IV, VI): Gaze preference left  Facial Movement (CN VII): lower weakness right lower  Motor*: Arm Left:  Paretic:  4/5, Leg Left:   Paretic:  4/5, Arm Right:   Plegic (0/5), Leg Right:   Plegic (0/5)  Sensation: caesar-hypoesthesia right    NIH Stroke Scale:    Level of Consciousness: 1 - drowsy  LOC Questions: 2 - answers none correctly  LOC Commands: 2 - performs neither correctly  Best Gaze: 1 - partial gaze palsy  Visual: 2 - complete hemianopia  Facial Palsy: 1 - minor  Motor Left Arm: 0 - no drift  Motor Right Arm: 4 - no movement  Motor Left Le - no drift  Motor Right Le - no movement  Limb Ataxia: 0 - absent  Sensory: 1 - mild to moderate loss  Best Language: 3 - mute  Dysarthria: 0 - normal articulation  Extinction and Inattention: 0 - no neglect  NIH Stroke Scale Total: 21      Laboratory:  CMP:     Recent  Labs  Lab 03/11/17 0114 03/11/17  0545 03/11/17  0954   CALCIUM 7.8*  --   --    ALBUMIN 2.7*  --   --    PROT 6.4  --   --    * 146*  --    K 3.1*  --  3.6   CO2 20*  --   --    *  --   --    BUN 26*  --   --    CREATININE 0.7  --   --    ALKPHOS 42*  --   --    ALT 18  --   --    AST 37  --   --    BILITOT 0.5  --   --      CBC:     Recent Labs  Lab 03/11/17 0114   WBC 13.38*   RBC 3.25*   HGB 10.7*   HCT 33.2*   *   *   MCH 32.9*   MCHC 32.2     Lipid Panel:     Recent Labs  Lab 03/07/17 0527   CHOL 120   LDLCALC 68.2   HDL 40   TRIG 59     Coagulation:     Recent Labs  Lab 03/07/17  0749   INR 1.1   APTT 21.2     Platelet Aggregation Study: No results for input(s): PLTAGG, PLTAGINTERP, PLTAGREGLACO, ADPPLTAGGREG in the last 168 hours.  Hgb A1C:     Recent Labs  Lab 03/07/17 0527   HGBA1C 5.9     TSH:     Recent Labs  Lab 03/07/17 0527   TSH 0.109*       Diagnostic Results:  I have personally reviewed  CT head. Date: 3/7/17  Temporal evolution of large left MCA territory  acute infarction with mass effect on the underlying parenchyma, sulci and left lateral ventricle with a 0.3 cm rightward midline shift.  No evidence of hydrocephalus or intracranial hemorrhage in this patient status post tPA.  Continued followup is recommended.     CTA stroke multiphase. Date: 3/7/17  -L M1 stenosis  -R vert narrowing    Echo. Date: 3/7/17  Moderately enlarged LA  CONCLUSIONS     1 - Concentric remodeling.     2 - Normal left ventricular systolic function (EF 60-65%).     3 - Right ventricular enlargement with moderately depressed systolic function.     4 - Left ventricular diastolic dysfunction.     5 - Biatrial enlargement.     6 - Mild aortic stenosis, MATEO = 1.6 cm2, peak velocity = 2.0 m/s, mean gradient = 10 mmHg.     7 - Severe tricuspid regurgitation.     8 - Pulmonary hypertension. The estimated PA systolic pressure is 102 mmHg.     9 - Increased central venous pressure.

## 2017-03-12 LAB
ALBUMIN SERPL BCP-MCNC: 2.4 G/DL
ALLENS TEST: ABNORMAL
ALP SERPL-CCNC: 45 U/L
ALT SERPL W/O P-5'-P-CCNC: 22 U/L
AMYLASE SERPL-CCNC: 146 U/L
ANION GAP SERPL CALC-SCNC: 7 MMOL/L
ANISOCYTOSIS BLD QL SMEAR: SLIGHT
AST SERPL-CCNC: 35 U/L
BASOPHILS # BLD AUTO: 0.01 K/UL
BASOPHILS NFR BLD: 0.1 %
BILIRUB SERPL-MCNC: 0.5 MG/DL
BILIRUB UR QL STRIP: NEGATIVE
BUN SERPL-MCNC: 25 MG/DL
CALCIUM SERPL-MCNC: 7.9 MG/DL
CHLORIDE SERPL-SCNC: 111 MMOL/L
CLARITY UR REFRACT.AUTO: CLEAR
CO2 SERPL-SCNC: 22 MMOL/L
COLOR UR AUTO: YELLOW
CREAT SERPL-MCNC: 0.7 MG/DL
DELSYS: ABNORMAL
DIFFERENTIAL METHOD: ABNORMAL
EOSINOPHIL # BLD AUTO: 0.3 K/UL
EOSINOPHIL NFR BLD: 1.7 %
ERYTHROCYTE [DISTWIDTH] IN BLOOD BY AUTOMATED COUNT: 13.6 %
ERYTHROCYTE [SEDIMENTATION RATE] IN BLOOD BY WESTERGREN METHOD: 25 MM/H
EST. GFR  (AFRICAN AMERICAN): >60 ML/MIN/1.73 M^2
EST. GFR  (NON AFRICAN AMERICAN): >60 ML/MIN/1.73 M^2
FIO2: 21
GLUCOSE SERPL-MCNC: 117 MG/DL
GLUCOSE UR QL STRIP: NEGATIVE
HCO3 UR-SCNC: 21.4 MMOL/L (ref 24–28)
HCT VFR BLD AUTO: 33.1 %
HGB BLD-MCNC: 10.7 G/DL
HGB UR QL STRIP: NEGATIVE
HYPOCHROMIA BLD QL SMEAR: ABNORMAL
KETONES UR QL STRIP: NEGATIVE
LEUKOCYTE ESTERASE UR QL STRIP: NEGATIVE
LIPASE SERPL-CCNC: 42 U/L
LYMPHOCYTES # BLD AUTO: 2.3 K/UL
LYMPHOCYTES NFR BLD: 15.5 %
MAGNESIUM SERPL-MCNC: 1.6 MG/DL
MAGNESIUM SERPL-MCNC: 1.7 MG/DL
MAGNESIUM SERPL-MCNC: 2 MG/DL
MCH RBC QN AUTO: 32.3 PG
MCHC RBC AUTO-ENTMCNC: 32.3 %
MCV RBC AUTO: 100 FL
MODE: ABNORMAL
MONOCYTES # BLD AUTO: 2.1 K/UL
MONOCYTES NFR BLD: 14 %
NEUTROPHILS # BLD AUTO: 10 K/UL
NEUTROPHILS NFR BLD: 68.7 %
NITRITE UR QL STRIP: NEGATIVE
PCO2 BLDA: 29.8 MMHG (ref 35–45)
PH SMN: 7.46 [PH] (ref 7.35–7.45)
PH UR STRIP: 8 [PH] (ref 5–8)
PHOSPHATE SERPL-MCNC: 1.9 MG/DL
PHOSPHATE SERPL-MCNC: 2 MG/DL
PLATELET # BLD AUTO: 163 K/UL
PLATELET BLD QL SMEAR: ABNORMAL
PMV BLD AUTO: 9.9 FL
PO2 BLDA: 104 MMHG (ref 80–100)
POC BE: -2 MMOL/L
POC SATURATED O2: 98 % (ref 95–100)
POC TCO2: 22 MMOL/L (ref 23–27)
POCT GLUCOSE: 128 MG/DL (ref 70–110)
POCT GLUCOSE: 145 MG/DL (ref 70–110)
POCT GLUCOSE: 160 MG/DL (ref 70–110)
POTASSIUM SERPL-SCNC: 3.6 MMOL/L
POTASSIUM SERPL-SCNC: 3.7 MMOL/L
POTASSIUM SERPL-SCNC: 4 MMOL/L
PROT SERPL-MCNC: 6.2 G/DL
PROT UR QL STRIP: NEGATIVE
RBC # BLD AUTO: 3.31 M/UL
SAMPLE: ABNORMAL
SITE: ABNORMAL
SODIUM SERPL-SCNC: 137 MMOL/L
SODIUM SERPL-SCNC: 139 MMOL/L
SODIUM SERPL-SCNC: 140 MMOL/L
SODIUM SERPL-SCNC: 140 MMOL/L
SP GR UR STRIP: 1.01 (ref 1–1.03)
SP02: 98
URN SPEC COLLECT METH UR: NORMAL
UROBILINOGEN UR STRIP-ACNC: 2 EU/DL
WBC # BLD AUTO: 14.67 K/UL

## 2017-03-12 PROCEDURE — 63600175 PHARM REV CODE 636 W HCPCS: Performed by: PHYSICIAN ASSISTANT

## 2017-03-12 PROCEDURE — 84295 ASSAY OF SERUM SODIUM: CPT

## 2017-03-12 PROCEDURE — 99900035 HC TECH TIME PER 15 MIN (STAT)

## 2017-03-12 PROCEDURE — 84132 ASSAY OF SERUM POTASSIUM: CPT

## 2017-03-12 PROCEDURE — 25000003 PHARM REV CODE 250

## 2017-03-12 PROCEDURE — 25000003 PHARM REV CODE 250: Performed by: STUDENT IN AN ORGANIZED HEALTH CARE EDUCATION/TRAINING PROGRAM

## 2017-03-12 PROCEDURE — 25000003 PHARM REV CODE 250: Performed by: PHYSICIAN ASSISTANT

## 2017-03-12 PROCEDURE — 20000000 HC ICU ROOM

## 2017-03-12 PROCEDURE — 84132 ASSAY OF SERUM POTASSIUM: CPT | Mod: 91

## 2017-03-12 PROCEDURE — 93005 ELECTROCARDIOGRAM TRACING: CPT

## 2017-03-12 PROCEDURE — 93010 ELECTROCARDIOGRAM REPORT: CPT | Mod: ,,, | Performed by: INTERNAL MEDICINE

## 2017-03-12 PROCEDURE — 84100 ASSAY OF PHOSPHORUS: CPT

## 2017-03-12 PROCEDURE — 94761 N-INVAS EAR/PLS OXIMETRY MLT: CPT

## 2017-03-12 PROCEDURE — 84100 ASSAY OF PHOSPHORUS: CPT | Mod: 91

## 2017-03-12 PROCEDURE — 63600175 PHARM REV CODE 636 W HCPCS: Performed by: STUDENT IN AN ORGANIZED HEALTH CARE EDUCATION/TRAINING PROGRAM

## 2017-03-12 PROCEDURE — 36600 WITHDRAWAL OF ARTERIAL BLOOD: CPT

## 2017-03-12 PROCEDURE — 99900026 HC AIRWAY MAINTENANCE (STAT)

## 2017-03-12 PROCEDURE — 85025 COMPLETE CBC W/AUTO DIFF WBC: CPT

## 2017-03-12 PROCEDURE — 82803 BLOOD GASES ANY COMBINATION: CPT

## 2017-03-12 PROCEDURE — 94640 AIRWAY INHALATION TREATMENT: CPT

## 2017-03-12 PROCEDURE — 25000003 PHARM REV CODE 250: Performed by: PSYCHIATRY & NEUROLOGY

## 2017-03-12 PROCEDURE — 83690 ASSAY OF LIPASE: CPT

## 2017-03-12 PROCEDURE — 51702 INSERT TEMP BLADDER CATH: CPT

## 2017-03-12 PROCEDURE — 83735 ASSAY OF MAGNESIUM: CPT | Mod: 91

## 2017-03-12 PROCEDURE — 80053 COMPREHEN METABOLIC PANEL: CPT

## 2017-03-12 PROCEDURE — 83735 ASSAY OF MAGNESIUM: CPT

## 2017-03-12 PROCEDURE — 81003 URINALYSIS AUTO W/O SCOPE: CPT

## 2017-03-12 PROCEDURE — 25000242 PHARM REV CODE 250 ALT 637 W/ HCPCS: Performed by: PHYSICIAN ASSISTANT

## 2017-03-12 PROCEDURE — 99291 CRITICAL CARE FIRST HOUR: CPT | Mod: ,,, | Performed by: ANESTHESIOLOGY

## 2017-03-12 PROCEDURE — 82150 ASSAY OF AMYLASE: CPT

## 2017-03-12 PROCEDURE — 94668 MNPJ CHEST WALL SBSQ: CPT

## 2017-03-12 RX ORDER — METOPROLOL TARTRATE 1 MG/ML
5 INJECTION, SOLUTION INTRAVENOUS ONCE AS NEEDED
Status: ACTIVE | OUTPATIENT
Start: 2017-03-12 | End: 2017-03-12

## 2017-03-12 RX ORDER — METOPROLOL TARTRATE 1 MG/ML
INJECTION, SOLUTION INTRAVENOUS
Status: COMPLETED
Start: 2017-03-12 | End: 2017-03-12

## 2017-03-12 RX ORDER — DILTIAZEM HYDROCHLORIDE 30 MG/1
30 TABLET, FILM COATED ORAL EVERY 8 HOURS
Status: DISCONTINUED | OUTPATIENT
Start: 2017-03-12 | End: 2017-03-12

## 2017-03-12 RX ADMIN — HEPARIN SODIUM 5000 UNITS: 5000 INJECTION, SOLUTION INTRAVENOUS; SUBCUTANEOUS at 01:03

## 2017-03-12 RX ADMIN — IPRATROPIUM BROMIDE AND ALBUTEROL SULFATE 3 ML: .5; 3 SOLUTION RESPIRATORY (INHALATION) at 12:03

## 2017-03-12 RX ADMIN — VITAMIN C 1 TABLET: TAB at 08:03

## 2017-03-12 RX ADMIN — Medication 3 ML: at 02:03

## 2017-03-12 RX ADMIN — STANDARDIZED SENNA CONCENTRATE AND DOCUSATE SODIUM 1 TABLET: 8.6; 5 TABLET, FILM COATED ORAL at 08:03

## 2017-03-12 RX ADMIN — HEPARIN SODIUM 5000 UNITS: 5000 INJECTION, SOLUTION INTRAVENOUS; SUBCUTANEOUS at 09:03

## 2017-03-12 RX ADMIN — Medication 10 ML: at 05:03

## 2017-03-12 RX ADMIN — POTASSIUM CHLORIDE 40 MEQ: 400 INJECTION, SOLUTION INTRAVENOUS at 11:03

## 2017-03-12 RX ADMIN — CIPROFLOXACIN 400 MG: 2 INJECTION, SOLUTION INTRAVENOUS at 05:03

## 2017-03-12 RX ADMIN — ASPIRIN 81 MG CHEWABLE TABLET 81 MG: 81 TABLET CHEWABLE at 08:03

## 2017-03-12 RX ADMIN — LEVOTHYROXINE SODIUM 25 MCG: 25 TABLET ORAL at 05:03

## 2017-03-12 RX ADMIN — CIPROFLOXACIN 400 MG: 2 INJECTION, SOLUTION INTRAVENOUS at 01:03

## 2017-03-12 RX ADMIN — IPRATROPIUM BROMIDE AND ALBUTEROL SULFATE 3 ML: .5; 3 SOLUTION RESPIRATORY (INHALATION) at 11:03

## 2017-03-12 RX ADMIN — DILTIAZEM HYDROCHLORIDE 60 MG: 60 TABLET, FILM COATED ORAL at 05:03

## 2017-03-12 RX ADMIN — IPRATROPIUM BROMIDE AND ALBUTEROL SULFATE 3 ML: .5; 3 SOLUTION RESPIRATORY (INHALATION) at 08:03

## 2017-03-12 RX ADMIN — MAGNESIUM SULFATE IN WATER 2 G: 40 INJECTION, SOLUTION INTRAVENOUS at 04:03

## 2017-03-12 RX ADMIN — VITAMIN D, TAB 1000IU (100/BT) 1000 UNITS: 25 TAB at 08:03

## 2017-03-12 RX ADMIN — Medication 10 ML: at 12:03

## 2017-03-12 RX ADMIN — CALCIUM 500 MG: 500 TABLET ORAL at 05:03

## 2017-03-12 RX ADMIN — METOPROLOL TARTRATE 5 MG: 5 INJECTION INTRAVENOUS at 08:03

## 2017-03-12 RX ADMIN — SODIUM CHLORIDE 1000 ML: 0.9 INJECTION, SOLUTION INTRAVENOUS at 11:03

## 2017-03-12 RX ADMIN — Medication 10 ML: at 06:03

## 2017-03-12 RX ADMIN — HEPARIN SODIUM 5000 UNITS: 5000 INJECTION, SOLUTION INTRAVENOUS; SUBCUTANEOUS at 05:03

## 2017-03-12 RX ADMIN — IPRATROPIUM BROMIDE AND ALBUTEROL SULFATE 3 ML: .5; 3 SOLUTION RESPIRATORY (INHALATION) at 03:03

## 2017-03-12 RX ADMIN — PROPRANOLOL HYDROCHLORIDE 10 MG: 10 TABLET ORAL at 08:03

## 2017-03-12 RX ADMIN — VANCOMYCIN HYDROCHLORIDE 1250 MG: 1 INJECTION, POWDER, LYOPHILIZED, FOR SOLUTION INTRAVENOUS at 02:03

## 2017-03-12 RX ADMIN — Medication 3 ML: at 09:03

## 2017-03-12 RX ADMIN — Medication 3 ML: at 05:03

## 2017-03-12 RX ADMIN — CALCIUM 500 MG: 500 TABLET ORAL at 08:03

## 2017-03-12 RX ADMIN — ATORVASTATIN CALCIUM 40 MG: 20 TABLET, FILM COATED ORAL at 08:03

## 2017-03-12 RX ADMIN — CIPROFLOXACIN 400 MG: 2 INJECTION, SOLUTION INTRAVENOUS at 09:03

## 2017-03-12 RX ADMIN — SODIUM PHOSPHATE, MONOBASIC, MONOHYDRATE 20.01 MMOL: 276; 142 INJECTION, SOLUTION INTRAVENOUS at 06:03

## 2017-03-12 RX ADMIN — POTASSIUM CHLORIDE 40 MEQ: 400 INJECTION, SOLUTION INTRAVENOUS at 04:03

## 2017-03-12 NOTE — PLAN OF CARE
Problem: Patient Care Overview  Goal: Plan of Care Review  Outcome: Ongoing (interventions implemented as appropriate)  POC reviewed with pt at 0500. Questions and concerns addressed w/ family. No acute events overnight. Pt progressing toward goals. Will continue to monitor. See flowsheets for full assessment and VS info       Past Medical History:   Diagnosis Date    Essential hypertension 3/7/2017    Hyperlipidemia 3/7/2017    Hypothyroidism 3/7/2017    Paroxysmal atrial fibrillation 3/7/2017

## 2017-03-12 NOTE — PLAN OF CARE
Problem: Patient Care Overview  Goal: Plan of Care Review  Outcome: Ongoing (interventions implemented as appropriate)  POC reviewed with pt and family at 1400. Pt's family verbalized understanding. Questions and concerns addressed. 12-lead EKG for decreased HR (half as low as yesterday's HR), d/c'd cardizem, held propanolol. Low urine output --->bladder scan showed 0cc urine---> 1L NS bolus, increased IVF NS from 50 to 75cc/hr. Pt more tired today, slept most of day. Sesay exchanged, WBC increased, sesay from outside facility. Electrolytes replaced. Pt's relative notified of risks related to bringing a  in the ICU room. Relative states theyre from the [place] and its just them and they dont will not take our recommendations. Will continue to monitor. See flowsheets for full assessment and VS info.

## 2017-03-12 NOTE — PROGRESS NOTES
ICU Progress Note  Neurocritical Care    Admit Date: 3/7/2017  LOS: 5    CC: Embolic stroke involving left middle cerebral artery    Code Status: DNR     SUBJECTIVE:     Interval History/Significant Events:   Oliguria  Hypovolemia  Less responsive  Rising wbc count  Adult ftt  Elect. abnl  Alkalosis      Medications:  Continuous Infusions:   sodium chloride 0.9% 50 mL/hr at 03/12/17 1200     Scheduled Meds:   albuterol-ipratropium 2.5mg-0.5mg/3mL  3 mL Nebulization Q8H    aspirin  81 mg Per NG tube Daily    atorvastatin  40 mg Per NG tube Daily    B-complex with vitamin C  1 tablet Per NG tube Daily    calcium carbonate  500 mg Per NG tube BID WM    ciprofloxacin  400 mg Intravenous Q8H    heparin (porcine)  5,000 Units Subcutaneous Q8H    levothyroxine  25 mcg Per NG tube Before breakfast    propranolol  10 mg Per NG tube BID    senna-docusate 8.6-50 mg  1 tablet Per NG tube BID    sodium chloride 0.9%  10 mL Intravenous Q6H    sodium chloride 0.9%  3 mL Intravenous Q8H    sodium chloride 3%  250 mL Intravenous Once    vitamin D  1,000 Units Per NG tube Daily     PRN Meds:.acetaminophen, hydrALAZINE, flu vacc [id number] 65yr up(PF), labetalol, magnesium sulfate IVPB, magnesium sulfate IVPB, pneumoc 13-evette conj-dip cr(PF), potassium chloride **AND** potassium chloride **AND** potassium chloride, Flushing PICC Protocol **AND** sodium chloride 0.9% **AND** sodium chloride 0.9%, sodium phosphate IVPB, sodium phosphate IVPB, sodium phosphate IVPB    OBJECTIVE:   Vital Signs (Most Recent):   Temp: 98.1 °F (36.7 °C) (03/12/17 1100)  Pulse: 77 (03/12/17 1100)  Resp: (!) 28 (03/12/17 1100)  BP: (!) 153/72 (03/12/17 1100)  SpO2: 98 % (03/12/17 1100)    Vital Signs (24h Range):   Temp:  [97.4 °F (36.3 °C)-98.8 °F (37.1 °C)] 98.1 °F (36.7 °C)  Pulse:  [] 77  Resp:  [20-38] 28  SpO2:  [96 %-100 %] 98 %  BP: (123-180)/() 153/72  Arterial Line BP: (154-174)/(59-76) 162/66    ICP/CPP (Last 24h):        I  & O (Last 24h):   Intake/Output Summary (Last 24 hours) at 03/12/17 1215  Last data filed at 03/12/17 1200   Gross per 24 hour   Intake             4960 ml   Output             1480 ml   Net             3480 ml     Physical Exam:  GA: Alert, comfortable, no acute distress.   HEENT: No scleral icterus or JVD.   Pulmonary: Clear to auscultation A/P/L. No wheezing, crackles, or rhonchi.  Cardiac: RRR S1 & S2 w/o rubs/murmurs/gallops.   Abdominal: Bowel sounds present x 4. No appreciable hepatosplenomegaly.  Skin: No jaundice, rashes, or visible lesions.  Neuro:  Awake  Opens eyes to voice  No change in exam         Lines/Drains/Airway:   Change sesay       PICC Double Lumen 03/08/17 1208 left brachial (Active)   Site Assessment Clean;Dry;Intact;No redness;No swelling 3/12/2017  7:01 AM   Lumen 1 Status Flushed;Blood return noted;Infusing 3/12/2017  7:01 AM   Lumen 2 Status Infusing 3/12/2017  7:01 AM   Length verónica (cm) 36 cm 3/8/2017 12:05 PM   Current Exposed Catheter (cm) 0 cm 3/8/2017 12:05 PM   Extremity Circumference (cm) 25 cm 3/8/2017 12:05 PM   Dressing Type Transparent 3/12/2017  7:01 AM   Dressing Status Biopatch in place;Clean;Dry;Intact 3/12/2017  7:01 AM   Dressing Intervention Dressing reinforced 3/12/2017  7:01 AM   Dressing Change Due 03/15/17 3/12/2017  7:01 AM   Daily Line Review Performed 3/12/2017  7:01 AM             NG/OG Tube 03/08/17 0615 [place] sump 14 Fr. Right nostril (Active)   Placement Check placement verified by distal tube length measurement;placement verified by x-ray 3/12/2017  7:01 AM   Distal Tube Length (cm) 63 3/10/2017  3:05 AM   Tolerance no signs/symptoms of discomfort 3/12/2017  7:01 AM   Securement anchored to nostril center w/ adhesive device 3/12/2017  7:01 AM   Clamp Status/Tolerance unclamped;no abdominal discomfort;no abdominal distention;no emesis;no nausea;no residual;no restlessness 3/12/2017  7:01 AM   Insertion Site Appearance no redness, warmth, tenderness, skin  "breakdown, drainage 3/12/2017  7:01 AM   Flush/Irrigation flushed w/;water;no resistance met 3/12/2017  7:01 AM   Feeding Method continuous 3/12/2017  7:01 AM   Current Rate (mL/hr) 40 mL/hr 3/12/2017  7:01 AM   Goal Rate (mL/hr) 40 mL/hr 3/12/2017  7:01 AM   Intake (mL) 90 mL 3/12/2017  8:00 AM   Intake (mL) - Formula Tube Feeding 40 3/12/2017 12:00 PM   Residual Amount (ml) 10 ml 3/12/2017  7:01 AM            Urethral Catheter 03/06/17 (Active)   Site Assessment Clean;Intact;Dry 3/12/2017  7:01 AM   Collection Container Urimeter 3/12/2017  7:01 AM   Securement Method secured to top of thigh w/ adhesive device 3/12/2017  7:01 AM   Catheter Care Performed yes 3/12/2017  7:01 AM   Reason for Continuing Urinary Catheterization Critically ill in ICU requiring intensive monitoring 3/12/2017  7:01 AM   CAUTI Prevention Bundle StatLock in place w 1" slack;Intact seal between catheter & drainage tubing;Drainage bag off the floor;Green sheeting clip in use;No dependent loops or kinks;Drainage bag not overfilled (<2/3 full);Drainage bag below bladder 3/12/2017  7:01 AM   Output (mL) 100 mL 3/12/2017 12:00 PM     Nutrition/Tube Feeds (if NPO state why): t feeds    Labs:  ABG:   Recent Labs  Lab 03/12/17  0517   PH 7.465*   PO2 104*   PCO2 29.8*   HCO3 21.4*   POCSATURATED 98   BE -2     BMP:  Recent Labs  Lab 03/12/17  0134  03/12/17  1101     < > 139   K 3.6  --  3.7   *  --   --    CO2 22*  --   --    BUN 25*  --   --    CREATININE 0.7  --   --    *  --   --    MG 1.7  --  2.0   PHOS 2.0*  --   --    < > = values in this interval not displayed.  LFT: Lab Results   Component Value Date    AST 35 03/12/2017    ALT 22 03/12/2017    ALKPHOS 45 (L) 03/12/2017    BILITOT 0.5 03/12/2017    ALBUMIN 2.4 (L) 03/12/2017    PROT 6.2 03/12/2017     CBC:   Lab Results   Component Value Date    WBC 14.67 (H) 03/12/2017    HGB 10.7 (L) 03/12/2017    HCT 33.1 (L) 03/12/2017     (H) 03/12/2017     03/12/2017 "     Microbiology x 7d:   Microbiology Results (last 7 days)     ** No results found for the last 168 hours. **        Imaging:  cxr clear  I personally reviewed the above image.    ASSESSMENT/PLAN:     Active Hospital Problems    Diagnosis    *Embolic stroke involving left middle cerebral artery    Brain compression    Cytotoxic cerebral edema    Right flaccid hemiplegia    Pleural effusion, bilateral    Pulmonary hypertension    Hypothyroidism    Essential hypertension    Paroxysmal atrial fibrillation    Hyperlipidemia      Plan:  Add vanco  Fluid bolus  Follow UO  Bradycardia; hold diltiazem  Will hold b blockers if bradycardia persists  Check ayana/lipase      Follow closely very concerned about low uo and rising wbc count      Cc time > 32 mins

## 2017-03-13 LAB
ALBUMIN SERPL BCP-MCNC: 2 G/DL
ALLENS TEST: ABNORMAL
ALP SERPL-CCNC: 43 U/L
ALT SERPL W/O P-5'-P-CCNC: 18 U/L
ANION GAP SERPL CALC-SCNC: 6 MMOL/L
AST SERPL-CCNC: 30 U/L
BASOPHILS # BLD AUTO: 0.01 K/UL
BASOPHILS NFR BLD: 0.1 %
BILIRUB SERPL-MCNC: 0.4 MG/DL
BUN SERPL-MCNC: 18 MG/DL
CALCIUM SERPL-MCNC: 7 MG/DL
CHLORIDE SERPL-SCNC: 112 MMOL/L
CO2 SERPL-SCNC: 18 MMOL/L
CREAT SERPL-MCNC: 0.6 MG/DL
DELSYS: ABNORMAL
DIFFERENTIAL METHOD: ABNORMAL
EOSINOPHIL # BLD AUTO: 0.3 K/UL
EOSINOPHIL NFR BLD: 2.4 %
ERYTHROCYTE [DISTWIDTH] IN BLOOD BY AUTOMATED COUNT: 13.7 %
EST. GFR  (AFRICAN AMERICAN): >60 ML/MIN/1.73 M^2
EST. GFR  (NON AFRICAN AMERICAN): >60 ML/MIN/1.73 M^2
GLUCOSE SERPL-MCNC: 115 MG/DL
HCO3 UR-SCNC: 21.4 MMOL/L (ref 24–28)
HCT VFR BLD AUTO: 30.4 %
HGB BLD-MCNC: 9.8 G/DL
LYMPHOCYTES # BLD AUTO: 2 K/UL
LYMPHOCYTES NFR BLD: 15.7 %
MAGNESIUM SERPL-MCNC: 1.5 MG/DL
MAGNESIUM SERPL-MCNC: 1.7 MG/DL
MAGNESIUM SERPL-MCNC: 2 MG/DL
MCH RBC QN AUTO: 32.3 PG
MCHC RBC AUTO-ENTMCNC: 32.2 %
MCV RBC AUTO: 100 FL
MODE: ABNORMAL
MONOCYTES # BLD AUTO: 1.2 K/UL
MONOCYTES NFR BLD: 9.2 %
NEUTROPHILS # BLD AUTO: 9.2 K/UL
NEUTROPHILS NFR BLD: 72.3 %
PCO2 BLDA: 30.1 MMHG (ref 35–45)
PH SMN: 7.46 [PH] (ref 7.35–7.45)
PHOSPHATE SERPL-MCNC: 1.7 MG/DL
PHOSPHATE SERPL-MCNC: 2.1 MG/DL
PHOSPHATE SERPL-MCNC: 3 MG/DL
PLATELET # BLD AUTO: 151 K/UL
PMV BLD AUTO: 10.3 FL
PO2 BLDA: 90 MMHG (ref 80–100)
POC BE: -2 MMOL/L
POC SATURATED O2: 98 % (ref 95–100)
POC TCO2: 22 MMOL/L (ref 23–27)
POCT GLUCOSE: 109 MG/DL (ref 70–110)
POCT GLUCOSE: 138 MG/DL (ref 70–110)
POCT GLUCOSE: 168 MG/DL (ref 70–110)
POCT GLUCOSE: 186 MG/DL (ref 70–110)
POCT GLUCOSE: 93 MG/DL (ref 70–110)
POTASSIUM SERPL-SCNC: 3.8 MMOL/L
POTASSIUM SERPL-SCNC: 4.2 MMOL/L
POTASSIUM SERPL-SCNC: 4.6 MMOL/L
PROT SERPL-MCNC: 5.4 G/DL
RBC # BLD AUTO: 3.03 M/UL
SAMPLE: ABNORMAL
SITE: ABNORMAL
SODIUM SERPL-SCNC: 132 MMOL/L
SODIUM SERPL-SCNC: 132 MMOL/L
SODIUM SERPL-SCNC: 136 MMOL/L
SODIUM SERPL-SCNC: 136 MMOL/L
SODIUM SERPL-SCNC: 141 MMOL/L
SP02: 98
VANCOMYCIN SERPL-MCNC: 6.7 UG/ML
WBC # BLD AUTO: 12.74 K/UL

## 2017-03-13 PROCEDURE — 25000003 PHARM REV CODE 250: Performed by: PHYSICIAN ASSISTANT

## 2017-03-13 PROCEDURE — 84100 ASSAY OF PHOSPHORUS: CPT

## 2017-03-13 PROCEDURE — 84295 ASSAY OF SERUM SODIUM: CPT

## 2017-03-13 PROCEDURE — 94668 MNPJ CHEST WALL SBSQ: CPT

## 2017-03-13 PROCEDURE — 97110 THERAPEUTIC EXERCISES: CPT

## 2017-03-13 PROCEDURE — 25000003 PHARM REV CODE 250: Performed by: PSYCHIATRY & NEUROLOGY

## 2017-03-13 PROCEDURE — 84132 ASSAY OF SERUM POTASSIUM: CPT | Mod: 91

## 2017-03-13 PROCEDURE — 83735 ASSAY OF MAGNESIUM: CPT | Mod: 91

## 2017-03-13 PROCEDURE — 63600175 PHARM REV CODE 636 W HCPCS: Performed by: PHYSICIAN ASSISTANT

## 2017-03-13 PROCEDURE — 84100 ASSAY OF PHOSPHORUS: CPT | Mod: 91

## 2017-03-13 PROCEDURE — 84132 ASSAY OF SERUM POTASSIUM: CPT

## 2017-03-13 PROCEDURE — 87205 SMEAR GRAM STAIN: CPT

## 2017-03-13 PROCEDURE — 63600175 PHARM REV CODE 636 W HCPCS: Performed by: STUDENT IN AN ORGANIZED HEALTH CARE EDUCATION/TRAINING PROGRAM

## 2017-03-13 PROCEDURE — 99291 CRITICAL CARE FIRST HOUR: CPT | Mod: ,,, | Performed by: PSYCHIATRY & NEUROLOGY

## 2017-03-13 PROCEDURE — 25000242 PHARM REV CODE 250 ALT 637 W/ HCPCS: Performed by: PHYSICIAN ASSISTANT

## 2017-03-13 PROCEDURE — 94640 AIRWAY INHALATION TREATMENT: CPT

## 2017-03-13 PROCEDURE — 20000000 HC ICU ROOM

## 2017-03-13 PROCEDURE — 84295 ASSAY OF SERUM SODIUM: CPT | Mod: 91

## 2017-03-13 PROCEDURE — 25000003 PHARM REV CODE 250: Performed by: STUDENT IN AN ORGANIZED HEALTH CARE EDUCATION/TRAINING PROGRAM

## 2017-03-13 PROCEDURE — 63600175 PHARM REV CODE 636 W HCPCS: Performed by: PSYCHIATRY & NEUROLOGY

## 2017-03-13 PROCEDURE — 97535 SELF CARE MNGMENT TRAINING: CPT

## 2017-03-13 PROCEDURE — 87070 CULTURE OTHR SPECIMN AEROBIC: CPT

## 2017-03-13 PROCEDURE — 63600175 PHARM REV CODE 636 W HCPCS

## 2017-03-13 PROCEDURE — 80053 COMPREHEN METABOLIC PANEL: CPT

## 2017-03-13 PROCEDURE — 82803 BLOOD GASES ANY COMBINATION: CPT

## 2017-03-13 PROCEDURE — 85025 COMPLETE CBC W/AUTO DIFF WBC: CPT

## 2017-03-13 PROCEDURE — 83735 ASSAY OF MAGNESIUM: CPT

## 2017-03-13 PROCEDURE — 36600 WITHDRAWAL OF ARTERIAL BLOOD: CPT

## 2017-03-13 PROCEDURE — 80202 ASSAY OF VANCOMYCIN: CPT

## 2017-03-13 PROCEDURE — 99900035 HC TECH TIME PER 15 MIN (STAT)

## 2017-03-13 PROCEDURE — 94761 N-INVAS EAR/PLS OXIMETRY MLT: CPT

## 2017-03-13 PROCEDURE — 86580 TB INTRADERMAL TEST: CPT | Performed by: PSYCHIATRY & NEUROLOGY

## 2017-03-13 RX ORDER — METOPROLOL TARTRATE 1 MG/ML
5 INJECTION, SOLUTION INTRAVENOUS ONCE
Status: COMPLETED | OUTPATIENT
Start: 2017-03-13 | End: 2017-03-13

## 2017-03-13 RX ORDER — MANNITOL 250 MG/ML
INJECTION, SOLUTION INTRAVENOUS
Status: COMPLETED
Start: 2017-03-13 | End: 2017-03-13

## 2017-03-13 RX ORDER — MANNITOL 250 MG/ML
50 INJECTION, SOLUTION INTRAVENOUS ONCE
Status: COMPLETED | OUTPATIENT
Start: 2017-03-13 | End: 2017-03-13

## 2017-03-13 RX ORDER — 3% SODIUM CHLORIDE 3 G/100ML
50 INJECTION, SOLUTION INTRAVENOUS CONTINUOUS
Status: DISCONTINUED | OUTPATIENT
Start: 2017-03-13 | End: 2017-03-15

## 2017-03-13 RX ORDER — SYRING-NEEDL,DISP,INSUL,0.3 ML 29 G X1/2"
296 SYRINGE, EMPTY DISPOSABLE MISCELLANEOUS ONCE
Status: COMPLETED | OUTPATIENT
Start: 2017-03-13 | End: 2017-03-13

## 2017-03-13 RX ORDER — DILTIAZEM HYDROCHLORIDE 30 MG/1
30 TABLET, FILM COATED ORAL EVERY 8 HOURS
Status: DISCONTINUED | OUTPATIENT
Start: 2017-03-13 | End: 2017-03-14

## 2017-03-13 RX ADMIN — ASPIRIN 81 MG CHEWABLE TABLET 81 MG: 81 TABLET CHEWABLE at 08:03

## 2017-03-13 RX ADMIN — STANDARDIZED SENNA CONCENTRATE AND DOCUSATE SODIUM 1 TABLET: 8.6; 5 TABLET, FILM COATED ORAL at 08:03

## 2017-03-13 RX ADMIN — DILTIAZEM HYDROCHLORIDE 30 MG: 30 TABLET, FILM COATED ORAL at 11:03

## 2017-03-13 RX ADMIN — VITAMIN D, TAB 1000IU (100/BT) 1000 UNITS: 25 TAB at 08:03

## 2017-03-13 RX ADMIN — Medication 10 ML: at 12:03

## 2017-03-13 RX ADMIN — MANNITOL 50 G: 12.5 INJECTION, SOLUTION INTRAVENOUS at 11:03

## 2017-03-13 RX ADMIN — CALCIUM 500 MG: 500 TABLET ORAL at 06:03

## 2017-03-13 RX ADMIN — SODIUM CHLORIDE 75 ML/HR: 3 INJECTION, SOLUTION INTRAVENOUS at 11:03

## 2017-03-13 RX ADMIN — VANCOMYCIN HYDROCHLORIDE 1250 MG: 1 INJECTION, POWDER, LYOPHILIZED, FOR SOLUTION INTRAVENOUS at 02:03

## 2017-03-13 RX ADMIN — CIPROFLOXACIN 400 MG: 2 INJECTION, SOLUTION INTRAVENOUS at 01:03

## 2017-03-13 RX ADMIN — HYDRALAZINE HYDROCHLORIDE 10 MG: 20 INJECTION INTRAMUSCULAR; INTRAVENOUS at 06:03

## 2017-03-13 RX ADMIN — SODIUM PHOSPHATE, MONOBASIC, MONOHYDRATE 20.01 MMOL: 276; 142 INJECTION, SOLUTION INTRAVENOUS at 06:03

## 2017-03-13 RX ADMIN — MANNITOL 50 G: 250 INJECTION, SOLUTION INTRAVENOUS at 11:03

## 2017-03-13 RX ADMIN — PROPRANOLOL HYDROCHLORIDE 10 MG: 10 TABLET ORAL at 08:03

## 2017-03-13 RX ADMIN — VITAMIN C 1 TABLET: TAB at 08:03

## 2017-03-13 RX ADMIN — SODIUM CHLORIDE 75 ML/HR: 3 INJECTION, SOLUTION INTRAVENOUS at 04:03

## 2017-03-13 RX ADMIN — DILTIAZEM HYDROCHLORIDE 30 MG: 30 TABLET, FILM COATED ORAL at 09:03

## 2017-03-13 RX ADMIN — POTASSIUM CHLORIDE 40 MEQ: 400 INJECTION, SOLUTION INTRAVENOUS at 03:03

## 2017-03-13 RX ADMIN — IPRATROPIUM BROMIDE AND ALBUTEROL SULFATE 3 ML: .5; 3 SOLUTION RESPIRATORY (INHALATION) at 03:03

## 2017-03-13 RX ADMIN — MAGNESIUM SULFATE IN WATER 2 G: 40 INJECTION, SOLUTION INTRAVENOUS at 03:03

## 2017-03-13 RX ADMIN — Medication 5 UNITS: at 02:03

## 2017-03-13 RX ADMIN — MAGESIUM CITRATE 296 ML: 1.75 LIQUID ORAL at 11:03

## 2017-03-13 RX ADMIN — LEVOTHYROXINE SODIUM 25 MCG: 25 TABLET ORAL at 06:03

## 2017-03-13 RX ADMIN — CIPROFLOXACIN 400 MG: 2 INJECTION, SOLUTION INTRAVENOUS at 09:03

## 2017-03-13 RX ADMIN — IPRATROPIUM BROMIDE AND ALBUTEROL SULFATE 3 ML: .5; 3 SOLUTION RESPIRATORY (INHALATION) at 08:03

## 2017-03-13 RX ADMIN — Medication 10 ML: at 06:03

## 2017-03-13 RX ADMIN — ATORVASTATIN CALCIUM 40 MG: 20 TABLET, FILM COATED ORAL at 08:03

## 2017-03-13 RX ADMIN — CALCIUM 500 MG: 500 TABLET ORAL at 08:03

## 2017-03-13 RX ADMIN — Medication 3 ML: at 02:03

## 2017-03-13 RX ADMIN — METOPROLOL TARTRATE 5 MG: 5 INJECTION INTRAVENOUS at 01:03

## 2017-03-13 RX ADMIN — CIPROFLOXACIN 400 MG: 2 INJECTION, SOLUTION INTRAVENOUS at 06:03

## 2017-03-13 RX ADMIN — Medication 3 ML: at 05:03

## 2017-03-13 RX ADMIN — DILTIAZEM HYDROCHLORIDE 30 MG: 30 TABLET, FILM COATED ORAL at 02:03

## 2017-03-13 RX ADMIN — HEPARIN SODIUM 5000 UNITS: 5000 INJECTION, SOLUTION INTRAVENOUS; SUBCUTANEOUS at 05:03

## 2017-03-13 RX ADMIN — LABETALOL HYDROCHLORIDE 10 MG: 5 INJECTION, SOLUTION INTRAVENOUS at 09:03

## 2017-03-13 RX ADMIN — Medication 3 ML: at 10:03

## 2017-03-13 RX ADMIN — Medication 10 ML: at 05:03

## 2017-03-13 RX ADMIN — IPRATROPIUM BROMIDE AND ALBUTEROL SULFATE 3 ML: .5; 3 SOLUTION RESPIRATORY (INHALATION) at 11:03

## 2017-03-13 NOTE — PT/OT/SLP PROGRESS
Occupational Therapy  Treatment    Zena Aguirre   MRN: [id number]   Admitting Diagnosis: Embolic stroke involving left middle cerebral artery    OT Date of Treatment: 17   OT Start Time:   OT Stop Time:   OT Total Time (min): 28 min    Billable Minutes:  Self Care/Home Management 14 and Therapeutic Exercise 14    General Precautions: Standard, aspiration, fall, NPO (DNR, cardiac)    Do you have any cultural, spiritual, Episcopal conflicts, given your current situation?: Hinduism    Subjective:  Communicated with RN prior to session.  RN approved EOB activities during session prior to start of session.  Pt with no verbalizations.    Pain Ratin/10  Pain Rating Post-Intervention: 0/10    Objective:  Patient found with: telemetry, pulse ox (continuous), sesay catheter, NG tube, blood pressure cuff, bowel management system, peripheral IV     Functional Mobility:  Bed Mobility:  Rolling/Turning Right: Total assistance  Scooting/Bridging: Total Assistance (scooting forward on EOB; dependent drawsheet transfer up HOB while supine)  Supine to Sit: Total Assistance  Sit to Supine: Total Assistance    Activities of Daily Living:     Grooming Position: EOB, Seated  Grooming Level of Assistance: Total assistance     Therapeutic Activities and Exercises:  Pt required Mod-Total (A) with postural control while seated EOB due to posterior leaning.  Pt required (A) to decrease pushing with LUE while seated EOB.  Provided RUE weight bearing while seated EOB.  Pt with eyes open 90% of session.  Pt followed no commands.  Provided RUE PROM in all available planes x 10 reps each while supine.  During session  MD Caty notified OT that therapy should hold off EOB activities for the next several days until next CT was performed & read due to increasing swelling in brain.  MD Caty approved continuation of in bed therex during this time until next CT.    AM-PAC 6 CLICK ADL   How much help from another person does this  patient currently need?   1 = Unable, Total/Dependent Assistance  2 = A lot, Maximum/Moderate Assistance  3 = A little, Minimum/Contact Guard/Supervision  4 = None, Modified [place]/Independent    Putting on and taking off regular lower body clothing? : 1  Bathing (including washing, rinsing, drying)?: 1  Toileting, which includes using toilet, bedpan, or urinal? : 1  Putting on and taking off regular upper body clothing?: 1  Taking care of personal grooming such as brushing teeth?: 1  Eating meals?: 1  Total Score: 6     AM-PAC Raw Score CMS G-Code Modifier Level of Impairment Assistance   6 % Total / Unable   7 - 9 CM 80 - 100% Maximal Assist   10 - 14 CL 60 - 80% Moderate Assist   15 - 19 CK 40 - 60% Moderate Assist   20 - 22 CJ 20 - 40% Minimal Assist   23 CI 1-20% SBA / CGA   24 CH 0% Independent/ Mod I     Patient left supine with all lines intact, call button in reach, RN notified, family member present and white board updated.    ASSESSMENT:  Zena Aguirre is a 81 y.o. female with a medical diagnosis of Embolic stroke involving left middle cerebral artery and presents with fair participation and motivation.  Pt with improved alertness on this date.  Pt continues to require (A) with all activities.    Rehab identified problem list/impairments: Rehab identified problem list/impairments: weakness, impaired endurance, impaired self care skills, impaired sensation, visual deficits, impaired balance, impaired functional mobilty, impaired cognition, decreased safety awareness, decreased coordination, decreased upper extremity function, decreased lower extremity function    Rehab potential is fair.    Activity tolerance: Fair    Discharge recommendations: Discharge Facility/Level Of Care Needs: nursing facility, skilled       GOALS:   Occupational Therapy Goals        Problem: Occupational Therapy Goal    Goal Priority Disciplines Outcome Interventions   Occupational Therapy Goal     OT, PT/OT Ongoing  (interventions implemented as appropriate)    Description:  Goals to be met by: 3/15/17     Patient will increase functional independence with ADLs by performing:    UE Dressing with Moderate Assistance.  LE Dressing with Maximum Assistance.  Grooming while seated with Moderate Assistance.  Toileting from bedside commode with Maximum Assistance for hygiene and clothing management.   Rolling to Bilateral with Moderate Assistance.   Supine to sit with Maximum Assistance.  Stand pivot transfers with Maximum Assistance.  Pt will follow 4/4 one step commands.                Plan:  Patient to be seen 5 x/week to address the above listed problems via self-care/home management, neuromuscular re-education, cognitive retraining, sensory integration, therapeutic activities, therapeutic exercises  Plan of Care expires: 04/07/17  Plan of Care reviewed with: patient, spouse, daughter         VERONA Hidalgo  03/13/2017

## 2017-03-13 NOTE — PT/OT/SLP PROGRESS
Speech Language Pathology  Attempted    Zena Aguirre  MRN: [id number]    Patient not seen today secondary to pt unavailable (off the floor for procedure-CT).  SLP unable to return this date.  Will follow-up at a later date and time as able.    Ann Brantley CCC-SLP   3/13/2017

## 2017-03-13 NOTE — PLAN OF CARE
Former[facility] in [place] [phone] Sara received referral    [facility] [phone] Daria received referral

## 2017-03-13 NOTE — PLAN OF CARE
Problem: Patient Care Overview  Goal: Plan of Care Review  Outcome: Ongoing (interventions implemented as appropriate)  POC reviewed with pt and family at 1400. Questions and concerns addressed. NCC ordered stat CT of head for AMS <Saturdays exam. CT abnormal/worse. 50mg mannitol given and 3% sodium started. At 645pm pt opening eyes spontaneously, purposeful frequent movement with left arm and leg. Not following simple commands. Withdraws on right side. Sodiums monitored. Electrolytes monitored and replaced. Hydralazine given once for SBP>160.Will continue to monitor. See flowsheets for full assessment and VS info.

## 2017-03-13 NOTE — PLAN OF CARE
Ssc sent referral to :      [facility] ( [phone]) ( f [phone])      [facility] ( [phone]) ( f [phone])       Diamante/jacque

## 2017-03-13 NOTE — PLAN OF CARE
SW completed the LOCET via phone and faxed the PASSR to the state.    SW received message from Maria Luz Castillo of [facility] ([phone])  regarding expected discharge date and questions. Attempted to return call but she had left for the day.     SW spoke with Daria at the [facility] ([phone]) regarding questions. Answered and will try to obtain copy of the [employer] insurance card as they are having trouble running it. Also sent clinicals via [employer].    Gilma Mary LMSW  Neurocritical Care   [facility]  [id number]

## 2017-03-14 LAB
ALBUMIN SERPL BCP-MCNC: 2.3 G/DL
ALP SERPL-CCNC: 50 U/L
ALT SERPL W/O P-5'-P-CCNC: 23 U/L
ANION GAP SERPL CALC-SCNC: 8 MMOL/L
AST SERPL-CCNC: 32 U/L
BASOPHILS # BLD AUTO: 0.01 K/UL
BASOPHILS NFR BLD: 0.1 %
BILIRUB SERPL-MCNC: 0.5 MG/DL
BUN SERPL-MCNC: 20 MG/DL
CALCIUM SERPL-MCNC: 8 MG/DL
CHLORIDE SERPL-SCNC: 118 MMOL/L
CO2 SERPL-SCNC: 20 MMOL/L
CREAT SERPL-MCNC: 0.7 MG/DL
DIFFERENTIAL METHOD: ABNORMAL
EOSINOPHIL # BLD AUTO: 0.1 K/UL
EOSINOPHIL NFR BLD: 0.8 %
ERYTHROCYTE [DISTWIDTH] IN BLOOD BY AUTOMATED COUNT: 13.7 %
EST. GFR  (AFRICAN AMERICAN): >60 ML/MIN/1.73 M^2
EST. GFR  (NON AFRICAN AMERICAN): >60 ML/MIN/1.73 M^2
GLUCOSE SERPL-MCNC: 174 MG/DL
HCT VFR BLD AUTO: 32.2 %
HGB BLD-MCNC: 10.5 G/DL
INR PPP: 1
LYMPHOCYTES # BLD AUTO: 1.2 K/UL
LYMPHOCYTES NFR BLD: 8.3 %
MAGNESIUM SERPL-MCNC: 1.8 MG/DL
MAGNESIUM SERPL-MCNC: 2.9 MG/DL
MCH RBC QN AUTO: 32.7 PG
MCHC RBC AUTO-ENTMCNC: 32.6 %
MCV RBC AUTO: 100 FL
MONOCYTES # BLD AUTO: 1.7 K/UL
MONOCYTES NFR BLD: 12 %
NEUTROPHILS # BLD AUTO: 11.1 K/UL
NEUTROPHILS NFR BLD: 78.2 %
PHOSPHATE SERPL-MCNC: 2.1 MG/DL
PLATELET # BLD AUTO: 177 K/UL
PMV BLD AUTO: 10.5 FL
POCT GLUCOSE: 136 MG/DL (ref 70–110)
POCT GLUCOSE: 161 MG/DL (ref 70–110)
POCT GLUCOSE: 184 MG/DL (ref 70–110)
POCT GLUCOSE: 203 MG/DL (ref 70–110)
POCT GLUCOSE: 247 MG/DL (ref 70–110)
POTASSIUM SERPL-SCNC: 4.1 MMOL/L
PROT SERPL-MCNC: 6.1 G/DL
PROTHROMBIN TIME: 11 SEC
RBC # BLD AUTO: 3.21 M/UL
SODIUM SERPL-SCNC: 144 MMOL/L
SODIUM SERPL-SCNC: 146 MMOL/L
SODIUM SERPL-SCNC: 147 MMOL/L
SODIUM SERPL-SCNC: 149 MMOL/L
SODIUM SERPL-SCNC: 150 MMOL/L
VANCOMYCIN TROUGH SERPL-MCNC: 4.4 UG/ML
WBC # BLD AUTO: 14.18 K/UL

## 2017-03-14 PROCEDURE — 63600175 PHARM REV CODE 636 W HCPCS: Performed by: PHYSICIAN ASSISTANT

## 2017-03-14 PROCEDURE — 25000242 PHARM REV CODE 250 ALT 637 W/ HCPCS: Performed by: PHYSICIAN ASSISTANT

## 2017-03-14 PROCEDURE — 83735 ASSAY OF MAGNESIUM: CPT

## 2017-03-14 PROCEDURE — 20000000 HC ICU ROOM

## 2017-03-14 PROCEDURE — 84295 ASSAY OF SERUM SODIUM: CPT | Mod: 91

## 2017-03-14 PROCEDURE — 94761 N-INVAS EAR/PLS OXIMETRY MLT: CPT

## 2017-03-14 PROCEDURE — 25000003 PHARM REV CODE 250: Performed by: PSYCHIATRY & NEUROLOGY

## 2017-03-14 PROCEDURE — 80053 COMPREHEN METABOLIC PANEL: CPT

## 2017-03-14 PROCEDURE — 84100 ASSAY OF PHOSPHORUS: CPT

## 2017-03-14 PROCEDURE — 83735 ASSAY OF MAGNESIUM: CPT | Mod: 91

## 2017-03-14 PROCEDURE — 92507 TX SP LANG VOICE COMM INDIV: CPT

## 2017-03-14 PROCEDURE — 99291 CRITICAL CARE FIRST HOUR: CPT | Mod: ,,, | Performed by: PSYCHIATRY & NEUROLOGY

## 2017-03-14 PROCEDURE — 92526 ORAL FUNCTION THERAPY: CPT

## 2017-03-14 PROCEDURE — 94668 MNPJ CHEST WALL SBSQ: CPT

## 2017-03-14 PROCEDURE — 85025 COMPLETE CBC W/AUTO DIFF WBC: CPT

## 2017-03-14 PROCEDURE — 99900035 HC TECH TIME PER 15 MIN (STAT)

## 2017-03-14 PROCEDURE — 99900026 HC AIRWAY MAINTENANCE (STAT)

## 2017-03-14 PROCEDURE — 85610 PROTHROMBIN TIME: CPT

## 2017-03-14 PROCEDURE — 25000003 PHARM REV CODE 250: Performed by: PHYSICIAN ASSISTANT

## 2017-03-14 PROCEDURE — 80202 ASSAY OF VANCOMYCIN: CPT

## 2017-03-14 PROCEDURE — 94640 AIRWAY INHALATION TREATMENT: CPT

## 2017-03-14 PROCEDURE — 97110 THERAPEUTIC EXERCISES: CPT

## 2017-03-14 RX ORDER — NAPROXEN SODIUM 220 MG/1
81 TABLET, FILM COATED ORAL DAILY
Status: DISCONTINUED | OUTPATIENT
Start: 2017-03-14 | End: 2017-03-21

## 2017-03-14 RX ORDER — DILTIAZEM HYDROCHLORIDE 30 MG/1
30 TABLET, FILM COATED ORAL EVERY 6 HOURS
Status: DISCONTINUED | OUTPATIENT
Start: 2017-03-14 | End: 2017-03-15

## 2017-03-14 RX ORDER — GLUCAGON 1 MG
1 KIT INJECTION
Status: DISCONTINUED | OUTPATIENT
Start: 2017-03-14 | End: 2017-03-22 | Stop reason: HOSPADM

## 2017-03-14 RX ORDER — CEFEPIME HYDROCHLORIDE 1 G/50ML
1 INJECTION, SOLUTION INTRAVENOUS
Status: COMPLETED | OUTPATIENT
Start: 2017-03-14 | End: 2017-03-17

## 2017-03-14 RX ORDER — INSULIN ASPART 100 [IU]/ML
1-10 INJECTION, SOLUTION INTRAVENOUS; SUBCUTANEOUS EVERY 4 HOURS PRN
Status: DISCONTINUED | OUTPATIENT
Start: 2017-03-14 | End: 2017-03-22 | Stop reason: HOSPADM

## 2017-03-14 RX ADMIN — Medication 3 ML: at 02:03

## 2017-03-14 RX ADMIN — MAGNESIUM SULFATE IN WATER 2 G: 40 INJECTION, SOLUTION INTRAVENOUS at 11:03

## 2017-03-14 RX ADMIN — CEFEPIME HYDROCHLORIDE 1 G: 1 INJECTION, POWDER, FOR SOLUTION INTRAMUSCULAR; INTRAVENOUS at 09:03

## 2017-03-14 RX ADMIN — INSULIN ASPART 2 UNITS: 100 INJECTION, SOLUTION INTRAVENOUS; SUBCUTANEOUS at 11:03

## 2017-03-14 RX ADMIN — VITAMIN D, TAB 1000IU (100/BT) 1000 UNITS: 25 TAB at 08:03

## 2017-03-14 RX ADMIN — Medication 10 ML: at 11:03

## 2017-03-14 RX ADMIN — DILTIAZEM HYDROCHLORIDE 30 MG: 30 TABLET, FILM COATED ORAL at 05:03

## 2017-03-14 RX ADMIN — Medication 3 ML: at 05:03

## 2017-03-14 RX ADMIN — SODIUM PHOSPHATE, MONOBASIC, MONOHYDRATE 20.01 MMOL: 276; 142 INJECTION, SOLUTION INTRAVENOUS at 09:03

## 2017-03-14 RX ADMIN — DILTIAZEM HYDROCHLORIDE 30 MG: 30 TABLET, FILM COATED ORAL at 11:03

## 2017-03-14 RX ADMIN — Medication 10 ML: at 05:03

## 2017-03-14 RX ADMIN — IPRATROPIUM BROMIDE AND ALBUTEROL SULFATE 3 ML: .5; 3 SOLUTION RESPIRATORY (INHALATION) at 11:03

## 2017-03-14 RX ADMIN — Medication 3 ML: at 09:03

## 2017-03-14 RX ADMIN — MAGNESIUM SULFATE IN WATER 2 G: 40 INJECTION, SOLUTION INTRAVENOUS at 08:03

## 2017-03-14 RX ADMIN — VANCOMYCIN HYDROCHLORIDE 1000 MG: 1 INJECTION, POWDER, LYOPHILIZED, FOR SOLUTION INTRAVENOUS at 04:03

## 2017-03-14 RX ADMIN — INSULIN ASPART 4 UNITS: 100 INJECTION, SOLUTION INTRAVENOUS; SUBCUTANEOUS at 05:03

## 2017-03-14 RX ADMIN — CALCIUM 500 MG: 500 TABLET ORAL at 08:03

## 2017-03-14 RX ADMIN — ASPIRIN 81 MG CHEWABLE TABLET 81 MG: 81 TABLET CHEWABLE at 11:03

## 2017-03-14 RX ADMIN — CEFEPIME HYDROCHLORIDE 1 G: 1 INJECTION, POWDER, FOR SOLUTION INTRAMUSCULAR; INTRAVENOUS at 02:03

## 2017-03-14 RX ADMIN — CIPROFLOXACIN 400 MG: 2 INJECTION, SOLUTION INTRAVENOUS at 05:03

## 2017-03-14 RX ADMIN — STANDARDIZED SENNA CONCENTRATE AND DOCUSATE SODIUM 1 TABLET: 8.6; 5 TABLET, FILM COATED ORAL at 09:03

## 2017-03-14 RX ADMIN — CALCIUM 500 MG: 500 TABLET ORAL at 05:03

## 2017-03-14 RX ADMIN — IPRATROPIUM BROMIDE AND ALBUTEROL SULFATE 3 ML: .5; 3 SOLUTION RESPIRATORY (INHALATION) at 08:03

## 2017-03-14 RX ADMIN — ATORVASTATIN CALCIUM 40 MG: 20 TABLET, FILM COATED ORAL at 08:03

## 2017-03-14 RX ADMIN — Medication 10 ML: at 12:03

## 2017-03-14 RX ADMIN — VITAMIN C 1 TABLET: TAB at 08:03

## 2017-03-14 RX ADMIN — LEVOTHYROXINE SODIUM 25 MCG: 25 TABLET ORAL at 08:03

## 2017-03-14 RX ADMIN — LABETALOL HYDROCHLORIDE 10 MG: 5 INJECTION, SOLUTION INTRAVENOUS at 12:03

## 2017-03-14 RX ADMIN — IPRATROPIUM BROMIDE AND ALBUTEROL SULFATE 3 ML: .5; 3 SOLUTION RESPIRATORY (INHALATION) at 03:03

## 2017-03-14 RX ADMIN — HYDRALAZINE HYDROCHLORIDE 10 MG: 20 INJECTION INTRAMUSCULAR; INTRAVENOUS at 02:03

## 2017-03-14 NOTE — PROGRESS NOTES
ICU Progress Note  Neurocritical Care    Admit Date: 3/7/2017  LOS: 7    CC: Embolic stroke involving left middle cerebral artery    SUBJECTIVE:     Interval History/Significant Events: Repeat CT head shows L MCA/GIRMA with slight decrease in midline shift from prior study.  Will decrease HTS.      Review of Symptoms: anni, pt somnolent   Constitutional: Denies fevers or chills.  Pulmonary: Denies shortness of breath or cough.  Cardiology: Denies chest pain or palpitations.  GI: Denies abdominal pain or constipation.  Neurologic: Denies new weakness, headaches, or paresthesias.     Medications:  Continuous Infusions:   sodium chloride 3% 50 mL/hr (03/14/17 1400)     Scheduled Meds:   albuterol-ipratropium 2.5mg-0.5mg/3mL  3 mL Nebulization Q8H    aspirin  81 mg Per NG tube Daily    atorvastatin  40 mg Per NG tube Daily    B-complex with vitamin C  1 tablet Per NG tube Daily    calcium carbonate  500 mg Per NG tube BID WM    ceFEPime (MAXIPIME) IVPB  1 g Intravenous Q8H    diltiaZEM  30 mg Per NG tube Q6H    levothyroxine  25 mcg Per NG tube Before breakfast    senna-docusate 8.6-50 mg  1 tablet Per NG tube BID    sodium chloride 0.9%  10 mL Intravenous Q6H    sodium chloride 0.9%  3 mL Intravenous Q8H    sodium chloride 3%  250 mL Intravenous Once    vancomycin (VANCOCIN) IVPB  1,000 mg Intravenous Q12H    vitamin D  1,000 Units Per NG tube Daily     PRN Meds:.acetaminophen, hydrALAZINE, flu vacc [id number] 65yr up(PF), labetalol, magnesium sulfate IVPB, magnesium sulfate IVPB, pneumoc 13-evette conj-dip cr(PF), potassium chloride **AND** potassium chloride **AND** potassium chloride, Flushing PICC Protocol **AND** sodium chloride 0.9% **AND** sodium chloride 0.9%, sodium phosphate IVPB, sodium phosphate IVPB, sodium phosphate IVPB    OBJECTIVE:     I & O (24h):    Intake/Output Summary (Last 24 hours) at 03/14/17 1552  Last data filed at 03/14/17 1427   Gross per 24 hour   Intake             3545 ml    Output             2015 ml   Net             1530 ml       Physical Exam:  Last Vitals:  Vitals:    03/14/17 1542   BP:    Pulse: (!) 116   Resp: (!) 23   Temp:      Vital Signs (24h Range):   Temp:  [98.4 °F (36.9 °C)-99 °F (37.2 °C)] 98.6 °F (37 °C)  Pulse:  [] 116  Resp:  [15-37] 23  SpO2:  [96 %-100 %] 99 %  BP: (120-189)/() 163/103  GA: Somnolent, no acute distress.   HEENT: No scleral icterus or JVD.   Pulmonary: Clear to auscultation A/P/L. No wheezing, crackles, or rhonchi.  Cardiac: RRR S1 & S2 w/o rubs/murmurs/gallops.   Abdominal: Bowel sounds present x 4. No appreciable hepatosplenomegaly.  Skin: No jaundice, rashes, or visible lesions.  Neuro:  --GCS: E3 V1 M6  --Mental Status:  Globally aphasic  --CN II-XII grossly intact.   --Pupils 3mm, PERRL.   --Corneal reflex, gag, cough intact.  --moves left extremities spontaneously  --withdraws to pain in right extremities     Invasive Lines:  --Central Line:        PICC Double Lumen 03/08/17 1208 left brachial (Active)   Site Assessment Clean;Dry;Intact;No redness;No swelling 3/13/2017  7:01 AM   Lumen 1 Status Flushed;Blood return noted;Infusing 3/13/2017  7:01 AM   Lumen 2 Status Infusing 3/13/2017  7:01 AM   Length verónica (cm) 36 cm 3/8/2017 12:05 PM   Current Exposed Catheter (cm) 0 cm 3/8/2017 12:05 PM   Extremity Circumference (cm) 25 cm 3/8/2017 12:05 PM   Dressing Type Transparent 3/13/2017  7:01 AM   Dressing Status Biopatch in place;Clean;Dry;Intact 3/13/2017  7:01 AM   Dressing Intervention Dressing reinforced 3/13/2017  7:01 AM   Dressing Change Due 03/15/17 3/13/2017  7:01 AM   Daily Line Review Performed 3/13/2017  7:01 AM       --Arterial Line:      --Surgical Drains/Mace:        NG/OG Tube 03/08/17 0615 Rodney arroyo 14 Fr. Right nostril (Active)   Placement Check placement verified by distal tube length measurement;placement verified by x-ray 3/13/2017  7:01 AM   Distal Tube Length (cm) 63 3/10/2017  3:05 AM   Tolerance no  "signs/symptoms of discomfort 3/13/2017  7:01 AM   Securement anchored to nostril center w/ adhesive device 3/13/2017  7:01 AM   Clamp Status/Tolerance unclamped;no abdominal discomfort;no abdominal distention;no emesis;no nausea;no residual;no restlessness 3/13/2017  7:01 AM   Insertion Site Appearance no redness, warmth, tenderness, skin breakdown, drainage 3/13/2017  7:01 AM   Flush/Irrigation flushed w/;water;no resistance met 3/13/2017  7:01 AM   Feeding Method continuous 3/13/2017  7:01 AM   Current Rate (mL/hr) 40 mL/hr 3/13/2017  7:01 AM   Goal Rate (mL/hr) 40 mL/hr 3/13/2017  7:01 AM   Intake (mL) 150 mL 3/13/2017  8:00 AM   Intake (mL) - Formula Tube Feeding 40 3/13/2017 12:00 PM   Residual Amount (ml) 10 ml 3/12/2017  7:01 AM            Urethral Catheter 03/12/17 1300 (Active)   Site Assessment Clean;Intact 3/13/2017  7:01 AM   Collection Container Urimeter 3/13/2017  7:01 AM   Securement Method secured to top of thigh w/ adhesive device 3/13/2017  7:01 AM   Catheter Care Performed yes 3/13/2017  7:01 AM   Reason for Continuing Urinary Catheterization Critically ill in ICU requiring intensive monitoring 3/13/2017  7:01 AM   CAUTI Prevention Bundle StatLock in place w 1" slack;Intact seal between catheter & drainage tubing;Drainage bag off the floor;Green sheeting clip in use;No dependent loops or kinks;Drainage bag not overfilled (<2/3 full);Drainage bag below bladder 3/13/2017  7:01 AM   Output (mL) 450 mL 3/13/2017 12:00 PM       Nutrition: TF     Labs:  ABG: No results for input(s): PH, PO2, PCO2, HCO3, POCSATURATED, BE in the last 24 hours.    BMP:  Recent Labs  Lab 03/14/17  0232  03/14/17  1231   *  < > 150*   K 4.1  --   --    *  --   --    CO2 20*  --   --    BUN 20  --   --    CREATININE 0.7  --   --    *  --   --    MG 1.8  --   --    PHOS 2.1*  --   --    < > = values in this interval not displayed.    LFT:   Lab Results   Component Value Date    AST 32 03/14/2017    ALT 23 " 03/14/2017    ALKPHOS 50 (L) 03/14/2017    BILITOT 0.5 03/14/2017    ALBUMIN 2.3 (L) 03/14/2017    PROT 6.1 03/14/2017       CBC:   Lab Results   Component Value Date    WBC 14.18 (H) 03/14/2017    HGB 10.5 (L) 03/14/2017    HCT 32.2 (L) 03/14/2017     (H) 03/14/2017     03/14/2017       Microbiology x 7d:   Microbiology Results (last 7 days)     Procedure Component Value Units Date/Time    Culture, Respiratory [[id number]] Collected:  03/13/17 1608    Order Status:  Completed Specimen:  Respiratory from Sputum Updated:  03/14/17 0543     Gram Stain (Respiratory) <10 epithelial cells per low power field.     Gram Stain (Respiratory) Rare WBC's     Gram Stain (Respiratory) Few Gram positive cocci     Gram Stain (Respiratory) Rare budding yeast     Gram Stain (Respiratory) Rare Gram negative rods          Imaging:  I have personally reviewed.     ASSESSMENT/PLAN:     Patient Active Problem List   Diagnosis    Hypothyroidism    Essential hypertension    Paroxysmal atrial fibrillation    Hyperlipidemia    Embolic stroke involving left middle cerebral artery    Cytotoxic cerebral edema    Right flaccid hemiplegia    Pleural effusion, bilateral    Pulmonary hypertension    Brain compression       Neuro:  L MCA/GIRMA infarct  --repeat CT head with slight decrease in midline shift from prior study   --resume ASA today  --decrease 3% NaCl to 50 cc/hour  --q 6 hour Na checks, goal 140-150  --continue atorvastatin     Pulmonary:  --NC prn    Cardiac:  Atrial fibrillation  --increase dilt to 30 mg qid    HTN  ---160 in setting of hemorrhagic conversion     Renal:   --BUN, creat stable  --24 hour net I/O: +540 cc    ID:  --cefepime and vanc for pna  --afebrile   --WBC 14    Hem/Onc:  --H/H decreased but stable  --platelets 177    Endocrine:  --A1C 5.9  --TSH 0.109, free T4 1.23  --continue levothyroxine 25 mcg daily     Fluids/Electrolytes/Nutrition/GI:  --3% NaCl @ 50 cc/hour  --check Na q 6  hours  --TF    Level III    Lissa Renner PA-C  Neuro Critical Care  [id number]

## 2017-03-14 NOTE — PT/OT/SLP PROGRESS
Occupational Therapy  Treatment    Zena Aguirre   MRN: [id number]   Admitting Diagnosis: Embolic stroke involving left middle cerebral artery    OT Date of Treatment: 17   OT Start Time: 110  OT Stop Time: 112  OT Total Time (min): 20 min    Billable Minutes:  Therapeutic Exercise 20    General Precautions: Standard, aspiration, NPO, fall (DNR, cardiac)    Do you have any cultural, spiritual, Jewish conflicts, given your current situation?: Temple    Subjective:  Communicated with RN prior to session.  Pt with no attempts to interact during session.    Pain Ratin/10  Pain Rating Post-Intervention: 0/10    Objective:  Patient found with: blood pressure cuff, telemetry, pulse ox (continuous), peripheral IV, sesay catheter     Functional Mobility:  Bed Mobility:  Rolling/Turning Right: Dependent  Scooting/Bridging: Dependent (drawsheet transfer up HOB while supine)    Therapeutic Activities and Exercises:  Pt performed AAROM with LUE & LLE & PROM with RUE & RLE in all planes available x 10 reps each while supine. Pt followed no commands during session.  Pt with eyes open 100% of session.    AM-PAC 6 CLICK ADL   How much help from another person does this patient currently need?   1 = Unable, Total/Dependent Assistance  2 = A lot, Maximum/Moderate Assistance  3 = A little, Minimum/Contact Guard/Supervision  4 = None, Modified [place]/Independent    Putting on and taking off regular lower body clothing? : 1  Bathing (including washing, rinsing, drying)?: 1  Toileting, which includes using toilet, bedpan, or urinal? : 1  Putting on and taking off regular upper body clothing?: 1  Taking care of personal grooming such as brushing teeth?: 1  Eating meals?: 1  Total Score: 6     AM-PAC Raw Score CMS G-Code Modifier Level of Impairment Assistance   6 % Total / Unable   7 - 9 CM 80 - 100% Maximal Assist   10 - 14 CL 60 - 80% Moderate Assist   15 - 19 CK 40 - 60% Moderate Assist   20 - 22 CJ 20 - 40%  Minimal Assist   23 CI 1-20% SBA / CGA   24 CH 0% Independent/ Mod I     Patient left supine with all lines intact, call button in reach, RN notified, family present and white board updated.    ASSESSMENT:  Zena Aguirre is a 81 y.o. female with a medical diagnosis of Embolic stroke involving left middle cerebral artery and presents with fair participation and motivation.  Pt continues to require (A) with all activities.    Rehab identified problem list/impairments: Rehab identified problem list/impairments: weakness, impaired endurance, impaired sensation, impaired self care skills, visual deficits, impaired balance, impaired functional mobilty, impaired cognition, decreased safety awareness, decreased coordination, decreased upper extremity function, decreased lower extremity function    Rehab potential is fair.    Activity tolerance: Fair    Discharge recommendations: Discharge Facility/Level Of Care Needs: nursing facility, skilled     GOALS:   Occupational Therapy Goals        Problem: Occupational Therapy Goal    Goal Priority Disciplines Outcome Interventions   Occupational Therapy Goal     OT, PT/OT Ongoing (interventions implemented as appropriate)    Description:  Goals to be met by: 3/15/17     Patient will increase functional independence with ADLs by performing:    UE Dressing with Moderate Assistance.  LE Dressing with Maximum Assistance.  Grooming while seated with Moderate Assistance.  Toileting from bedside commode with Maximum Assistance for hygiene and clothing management.   Rolling to Bilateral with Moderate Assistance.   Supine to sit with Maximum Assistance.  Stand pivot transfers with Maximum Assistance.  Pt will follow 4/4 one step commands.                Plan:  Patient to be seen 5 x/week to address the above listed problems via self-care/home management, neuromuscular re-education, cognitive retraining, sensory integration, therapeutic activities, therapeutic exercises  Plan of Care  expires: 04/07/17  Plan of Care reviewed with: patient, spouse         VERONA Hidalgo  03/14/2017

## 2017-03-14 NOTE — PLAN OF CARE
Problem: SLP Goal  Goal: SLP Goal  Updated Speech Language Pathology Goals  Goals expected to be met by 3/22  1. Pt will participate in ongoing swallowing assessment to determine if safe to begin PO intake.  2. Pt will vocalize x1 during session.  3. Pt will follow simple, one step commands given max cues with 70% acc to improve overall receptive language  4. Pt will answer simple y/n questions in any modality with 70% acc to improve overall receptive language skills  5. Pt will identify objects in FO2 via eye gaze with 60% acc to improve overall receptive language skills      Speech Language Pathology Goals  Goals expected to be met by 3/14:  1. Pt will participate in ongoing swallowing assessment to determine if safe to begin PO intake.  2. Pt will participate in speech/language evaluation to determine further goals.      Outcome: Ongoing (interventions implemented as appropriate)  Pt w/ vocalization and mouthing words w/ singing tasks today.  Cont ST per POC.     Ann Brantley CCC-SLP  3/14/2017

## 2017-03-14 NOTE — PT/OT/SLP PROGRESS
Physical Therapy  Treatment    Zena Aguirre   MRN: [id number]   Admitting Diagnosis: Embolic stroke involving left middle cerebral artery    PT Received On: 17  PT Start Time: 1501     PT Stop Time: 1532    PT Total Time (min): 31 min       Billable Minutes:  Therapeutic Exercise 31    Treatment Type: Treatment  PT/PTA: PTA     PTA Visit Number: 1       General Precautions: Standard, aphasia, aspiration, fall, NPO (DNR, cardiac, in bed only activity)  Orthopedic Precautions: N/A   Braces: N/A         Subjective:  Communicated with RN (Laura MILIAN) prior to session.  Pt non-verbal throughout tx session.  Pt with eyes open <10% of time    Pain Ratin/10   Pain Rating Post-Intervention: 0/10    Objective:   Patient found with: blood pressure cuff, sesay catheter, NG tube, pressure relief boots, peripheral IV, pulse ox (continuous), SCD, telemetry (Pt found sup in bed with cousin present in the room)    Functional Mobility:  Bed Mobility:   Supine to Sit:  (NP 2* in bed only activity at current time)    Therapeutic Activities and Exercises:  R LE PROM ther ex's sup in bed x10-12 reps with vc's   L LE AAROM/PROM ther ex's sup in bed x10-12 reps with vc's    Pt's family educated on pt being in bed only activity at current time    AM-PAC 6 CLICK MOBILITY  How much help from another person does this patient currently need?   1 = Unable, Total/Dependent Assistance  2 = A lot, Maximum/Moderate Assistance  3 = A little, Minimum/Contact Guard/Supervision  4 = None, Modified Medon/Independent    Turning over in bed (including adjusting bedclothes, sheets and blankets)?: 1  Sitting down on and standing up from a chair with arms (e.g., wheelchair, bedside commode, etc.): 1  Moving from lying on back to sitting on the side of the bed?: 1  Moving to and from a bed to a chair (including a wheelchair)?: 1  Need to walk in hospital room?: 1  Climbing 3-5 steps with a railing?: 1  Total Score: 6    AM-PAC Raw Score CMS  G-Code Modifier Level of Impairment Assistance   6 % Total / Unable   7 - 9 CM 80 - 100% Maximal Assist   10 - 14 CL 60 - 80% Moderate Assist   15 - 19 CK 40 - 60% Moderate Assist   20 - 22 CJ 20 - 40% Minimal Assist   23 CI 1-20% SBA / CGA   24 CH 0% Independent/ Mod I     Patient left supine with all lines intact, call button in reach, RN notified and family present.    Assessment:  Zena Aguirre is a 81 y.o. female with a medical diagnosis of Embolic stroke involving left middle cerebral artery and presents with lethargy and limited command following.  Pt with stable vital signs during tx session. Pt will cont to benefit from skilled PT intervention to address deficits and improve functional mobility.     Rehab identified problem list/impairments: Rehab identified problem list/impairments: weakness, impaired endurance, impaired sensation, impaired self care skills, impaired functional mobilty, impaired balance, visual deficits, impaired cognition, decreased coordination, decreased upper extremity function, decreased lower extremity function, decreased safety awareness, impaired coordination, impaired fine motor, edema    Rehab potential is fair.    Activity tolerance: Good    Discharge recommendations: Discharge Facility/Level Of Care Needs: nursing facility, skilled     Barriers to discharge: Barriers to Discharge: Inaccessible home environment, Decreased caregiver support    Equipment recommendations: Equipment Needed After Discharge: hospital bed     GOALS:   Physical Therapy Goals        Problem: Physical Therapy Goal    Goal Priority Disciplines Outcome Goal Variances Interventions   Physical Therapy Goal     PT/OT, PT Ongoing (interventions implemented as appropriate)     Description:  Goals to be met by: 3/21/17     Patient will increase functional independence with mobility by performing:    Supine to sit with Moderate Assistance.  Sit to supine with Moderate Assistance.   Sit to stand transfer  with Moderate Assistance using No Assistive Device.  Bed to chair transfer via Squat Pivot with Moderate Assistance using No Assistive Device.  Static sitting at edge of bed x 15 minutes with BUE support and BLE support with Minimal Assistance.  Able to tolerate exercise for 15-20 reps with assistance as needed.  Patient and family able to teachback stroke & positioning education independently.                  PLAN:    Patient to be seen 5 x/week  to address the above listed problems via therapeutic activities, therapeutic exercises, neuromuscular re-education  Plan of Care expires: 04/07/17  Plan of Care reviewed with: patient         Yenny Bolanos, PTA  03/13/2017

## 2017-03-14 NOTE — PT/OT/SLP PROGRESS
Physical Therapy  PT Hold      Zena Aguirre  MRN: [id number]    Patient not seen today secondary to MD hold (Comment) (Pt appropriate for bed-level exercise only due to increase in midline shift. OT to follow and notify PT when appropriate for EOB. ). Will follow-up as POC allows.    Josee Cain, PT

## 2017-03-14 NOTE — PT/OT/SLP PROGRESS
"Speech Language Pathology  Treatment    Zena Aguirre   MRN: [id number]   Admitting Diagnosis: Embolic stroke involving left middle cerebral artery    Diet recommendations: Solid Diet Level: NPO  Liquid Diet Level: NPO Continue alternative means of nutrition/hydration via NG-tube    SLP Treatment Date: 17  Speech Start Time: 925     Speech Stop Time: 947     Speech Total (min): 22 min       TREATMENT BILLABLE MINUTES:  Speech Therapy Individual 11 and Treatment Swallowing Dysfunction 11    Has the patient been evaluated by SLP for swallowing? : Yes  Keep patient NPO?: Yes   General Precautions: Standard, aphasia, aspiration, fall, NPO  Current Respiratory Status: other (comment) (room air)       Subjective:  "She kind of had a rough night last night."  Pt's family at bedside stated    Pain Ratin/10              Pain Rating Post-Intervention: 0/10    Communicated w/ RN prior to and following tx session.    Objective:   Patient found with: blood pressure cuff, peripheral IV, pulse ox (continuous), telemetry, sesay catheter, NG tube    Pt was awake and alert upon SLP presentation.  She was provided oral care w/ moistened oral swabs to gums, teeth, cheeks, tongue and palate.  She was able to generate swallow reflex in response to stimulation.  Pt was seated upright for po trials of small ice chips.  Pt was unable to tolerate HOB elevated to upright positioning d/t increased heart rate.  HOB was lowered for pt comfort and po trials deferred.    Pt was provided max cues w/ verbal, visual and tactile stimulation for phonation.  She was able to generate soft grunts w/ stimulation.  SLP completed singing tasks w/ max cues w/ pt.  Pt was able to generate voicing and some mouth movements x3 songs.  No true words were produced by pt w/ singing.  Pt answered no simple yes/no questions via any modality.  She was able to follow x1/4 directives given max cues.  Education was provided to pt and family re: communication " strategies and SLP poc.    FIM:                                 Assessment:  Zena Aguirre is a 81 y.o. female with a medical diagnosis of Embolic stroke involving left middle cerebral artery and presents with aphasia, dysarthria, apraxia, and dysphagia.    Discharge recommendations: Discharge Facility/Level Of Care Needs: nursing facility, skilled     Goals:   SLP Goals        Problem: SLP Goal    Goal Priority Disciplines Outcome   SLP Goal     SLP Ongoing (interventions implemented as appropriate)   Description:  Updated Speech Language Pathology Goals  Goals expected to be met by 3/22  1. Pt will participate in ongoing swallowing assessment to determine if safe to begin PO intake.  2. Pt will vocalize x1 during session.  3. Pt will follow simple, one step commands given max cues with 70% acc to improve overall receptive language  4. Pt will answer simple y/n questions in any modality with 70% acc to improve overall receptive language skills  5. Pt will identify objects in FO2 via eye gaze with 60% acc to improve overall receptive language skills      Speech Language Pathology Goals  Goals expected to be met by 3/14:  1. Pt will participate in ongoing swallowing assessment to determine if safe to begin PO intake.  2. Pt will participate in speech/language evaluation to determine further goals.                     Plan:   Patient to be seen Therapy Frequency: 5 x/week   Plan of Care expires: 04/05/17  Plan of Care reviewed with: patient, family  SLP Follow-up?: Yes              Ann Brantley CCC-SLP  03/14/2017

## 2017-03-14 NOTE — PLAN OF CARE
Problem: Patient Care Overview  Goal: Plan of Care Review  Outcome: Ongoing (interventions implemented as appropriate)  POC reviewed with pt and family at 0500. Pt unable to verbalize understanding. Questions and concerns addressed. Hydralazine given once overnight for SBP >160. Pt taken for CT scan overnight without incident. No acute events overnight. Pt progressing toward goals. Will continue to monitor. See flowsheets for full assessment and VS info

## 2017-03-14 NOTE — PLAN OF CARE
Problem: Physical Therapy Goal  Goal: Physical Therapy Goal  Goals to be met by: 3/21/17     Patient will increase functional independence with mobility by performing:    Supine to sit with Moderate Assistance.  Sit to supine with Moderate Assistance.   Sit to stand transfer with Moderate Assistance using No Assistive Device.  Bed to chair transfer via Squat Pivot with Moderate Assistance using No Assistive Device.  Static sitting at edge of bed x 15 minutes with BUE support and BLE support with Minimal Assistance.  Able to tolerate exercise for 15-20 reps with assistance as needed.  Patient and family able to teachback stroke & positioning education independently.              Goals remain appropriate.      Yenny Bolanos, PTA.  3/13/2017

## 2017-03-15 LAB
ALBUMIN SERPL BCP-MCNC: 2.2 G/DL
ALLENS TEST: ABNORMAL
ALP SERPL-CCNC: 50 U/L
ALT SERPL W/O P-5'-P-CCNC: 29 U/L
ANION GAP SERPL CALC-SCNC: 6 MMOL/L
AST SERPL-CCNC: 39 U/L
BASOPHILS # BLD AUTO: 0.02 K/UL
BASOPHILS NFR BLD: 0.1 %
BILIRUB SERPL-MCNC: 0.3 MG/DL
BUN SERPL-MCNC: 26 MG/DL
CALCIUM SERPL-MCNC: 7.8 MG/DL
CHLORIDE SERPL-SCNC: 127 MMOL/L
CO2 SERPL-SCNC: 21 MMOL/L
CREAT SERPL-MCNC: 0.7 MG/DL
DELSYS: ABNORMAL
DIFFERENTIAL METHOD: ABNORMAL
EOSINOPHIL # BLD AUTO: 0.1 K/UL
EOSINOPHIL NFR BLD: 0.4 %
ERYTHROCYTE [DISTWIDTH] IN BLOOD BY AUTOMATED COUNT: 14.2 %
EST. GFR  (AFRICAN AMERICAN): >60 ML/MIN/1.73 M^2
EST. GFR  (NON AFRICAN AMERICAN): >60 ML/MIN/1.73 M^2
GLUCOSE SERPL-MCNC: 169 MG/DL
HCO3 UR-SCNC: 19.6 MMOL/L (ref 24–28)
HCT VFR BLD AUTO: 30.4 %
HGB BLD-MCNC: 9.7 G/DL
LYMPHOCYTES # BLD AUTO: 1.2 K/UL
LYMPHOCYTES NFR BLD: 8.1 %
MAGNESIUM SERPL-MCNC: 2.2 MG/DL
MCH RBC QN AUTO: 32.7 PG
MCHC RBC AUTO-ENTMCNC: 31.9 %
MCV RBC AUTO: 102 FL
MONOCYTES # BLD AUTO: 1.8 K/UL
MONOCYTES NFR BLD: 12.6 %
NEUTROPHILS # BLD AUTO: 11.3 K/UL
NEUTROPHILS NFR BLD: 78.5 %
PCO2 BLDA: 26.3 MMHG (ref 35–45)
PH SMN: 7.48 [PH] (ref 7.35–7.45)
PHOSPHATE SERPL-MCNC: 2.9 MG/DL
PLATELET # BLD AUTO: 193 K/UL
PMV BLD AUTO: 10.2 FL
PO2 BLDA: 80 MMHG (ref 80–100)
POC BE: -4 MMOL/L
POC SATURATED O2: 97 % (ref 95–100)
POC TCO2: 20 MMOL/L (ref 23–27)
POCT GLUCOSE: 132 MG/DL (ref 70–110)
POCT GLUCOSE: 143 MG/DL (ref 70–110)
POCT GLUCOSE: 172 MG/DL (ref 70–110)
POCT GLUCOSE: 179 MG/DL (ref 70–110)
POCT GLUCOSE: 209 MG/DL (ref 70–110)
POCT GLUCOSE: 75 MG/DL (ref 70–110)
POTASSIUM SERPL-SCNC: 3.8 MMOL/L
POTASSIUM SERPL-SCNC: 4.4 MMOL/L
PROT SERPL-MCNC: 5.9 G/DL
PROVIDER CREDENTIALS: ABNORMAL
PROVIDER NOTIFIED: ABNORMAL
RBC # BLD AUTO: 2.97 M/UL
SAMPLE: ABNORMAL
SITE: ABNORMAL
SODIUM SERPL-SCNC: 149 MMOL/L
SODIUM SERPL-SCNC: 150 MMOL/L
SODIUM SERPL-SCNC: 152 MMOL/L
SODIUM SERPL-SCNC: 152 MMOL/L
SODIUM SERPL-SCNC: 154 MMOL/L
SODIUM SERPL-SCNC: 154 MMOL/L
TIME NOTIFIED: 1103
VERBAL RESULT READBACK PERFORMED: YES
WBC # BLD AUTO: 14.43 K/UL

## 2017-03-15 PROCEDURE — 82803 BLOOD GASES ANY COMBINATION: CPT

## 2017-03-15 PROCEDURE — 84132 ASSAY OF SERUM POTASSIUM: CPT

## 2017-03-15 PROCEDURE — 25000003 PHARM REV CODE 250: Performed by: PHYSICIAN ASSISTANT

## 2017-03-15 PROCEDURE — 36600 WITHDRAWAL OF ARTERIAL BLOOD: CPT

## 2017-03-15 PROCEDURE — 80053 COMPREHEN METABOLIC PANEL: CPT

## 2017-03-15 PROCEDURE — 83735 ASSAY OF MAGNESIUM: CPT

## 2017-03-15 PROCEDURE — 85025 COMPLETE CBC W/AUTO DIFF WBC: CPT

## 2017-03-15 PROCEDURE — 94640 AIRWAY INHALATION TREATMENT: CPT

## 2017-03-15 PROCEDURE — 63600175 PHARM REV CODE 636 W HCPCS: Performed by: PHYSICIAN ASSISTANT

## 2017-03-15 PROCEDURE — 84100 ASSAY OF PHOSPHORUS: CPT

## 2017-03-15 PROCEDURE — 25000242 PHARM REV CODE 250 ALT 637 W/ HCPCS: Performed by: PHYSICIAN ASSISTANT

## 2017-03-15 PROCEDURE — 99900035 HC TECH TIME PER 15 MIN (STAT)

## 2017-03-15 PROCEDURE — 25000003 PHARM REV CODE 250

## 2017-03-15 PROCEDURE — 99222 1ST HOSP IP/OBS MODERATE 55: CPT | Mod: ,,, | Performed by: SURGERY

## 2017-03-15 PROCEDURE — 84295 ASSAY OF SERUM SODIUM: CPT | Mod: 91

## 2017-03-15 PROCEDURE — 92526 ORAL FUNCTION THERAPY: CPT

## 2017-03-15 PROCEDURE — 99291 CRITICAL CARE FIRST HOUR: CPT | Mod: ,,, | Performed by: PSYCHIATRY & NEUROLOGY

## 2017-03-15 PROCEDURE — 25000003 PHARM REV CODE 250: Performed by: PSYCHIATRY & NEUROLOGY

## 2017-03-15 PROCEDURE — 92507 TX SP LANG VOICE COMM INDIV: CPT

## 2017-03-15 PROCEDURE — 20000000 HC ICU ROOM

## 2017-03-15 RX ORDER — HYDRALAZINE HYDROCHLORIDE 20 MG/ML
10 INJECTION INTRAMUSCULAR; INTRAVENOUS EVERY 4 HOURS PRN
Status: DISCONTINUED | OUTPATIENT
Start: 2017-03-15 | End: 2017-03-21

## 2017-03-15 RX ORDER — LISINOPRIL 20 MG/1
20 TABLET ORAL DAILY
Status: DISCONTINUED | OUTPATIENT
Start: 2017-03-15 | End: 2017-03-18

## 2017-03-15 RX ORDER — HEPARIN SODIUM 5000 [USP'U]/ML
5000 INJECTION, SOLUTION INTRAVENOUS; SUBCUTANEOUS EVERY 8 HOURS
Status: DISCONTINUED | OUTPATIENT
Start: 2017-03-15 | End: 2017-03-22 | Stop reason: HOSPADM

## 2017-03-15 RX ORDER — METOPROLOL TARTRATE 1 MG/ML
5 INJECTION, SOLUTION INTRAVENOUS ONCE
Status: COMPLETED | OUTPATIENT
Start: 2017-03-15 | End: 2017-03-15

## 2017-03-15 RX ORDER — METOPROLOL TARTRATE 1 MG/ML
INJECTION, SOLUTION INTRAVENOUS
Status: COMPLETED
Start: 2017-03-15 | End: 2017-03-15

## 2017-03-15 RX ORDER — DILTIAZEM HYDROCHLORIDE 60 MG/1
60 TABLET, FILM COATED ORAL EVERY 6 HOURS
Status: DISCONTINUED | OUTPATIENT
Start: 2017-03-15 | End: 2017-03-20

## 2017-03-15 RX ADMIN — LISINOPRIL 20 MG: 20 TABLET ORAL at 11:03

## 2017-03-15 RX ADMIN — DILTIAZEM HYDROCHLORIDE 60 MG: 60 TABLET, FILM COATED ORAL at 11:03

## 2017-03-15 RX ADMIN — CEFEPIME HYDROCHLORIDE 1 G: 1 INJECTION, POWDER, FOR SOLUTION INTRAMUSCULAR; INTRAVENOUS at 01:03

## 2017-03-15 RX ADMIN — Medication 3 ML: at 01:03

## 2017-03-15 RX ADMIN — IPRATROPIUM BROMIDE AND ALBUTEROL SULFATE 3 ML: .5; 3 SOLUTION RESPIRATORY (INHALATION) at 04:03

## 2017-03-15 RX ADMIN — INSULIN ASPART 4 UNITS: 100 INJECTION, SOLUTION INTRAVENOUS; SUBCUTANEOUS at 05:03

## 2017-03-15 RX ADMIN — INSULIN ASPART 2 UNITS: 100 INJECTION, SOLUTION INTRAVENOUS; SUBCUTANEOUS at 05:03

## 2017-03-15 RX ADMIN — HYDRALAZINE HYDROCHLORIDE 10 MG: 20 INJECTION INTRAMUSCULAR; INTRAVENOUS at 08:03

## 2017-03-15 RX ADMIN — ASPIRIN 81 MG CHEWABLE TABLET 81 MG: 81 TABLET CHEWABLE at 08:03

## 2017-03-15 RX ADMIN — STANDARDIZED SENNA CONCENTRATE AND DOCUSATE SODIUM 1 TABLET: 8.6; 5 TABLET, FILM COATED ORAL at 09:03

## 2017-03-15 RX ADMIN — INSULIN ASPART 1 UNITS: 100 INJECTION, SOLUTION INTRAVENOUS; SUBCUTANEOUS at 03:03

## 2017-03-15 RX ADMIN — METOPROLOL TARTRATE 5 MG: 5 INJECTION INTRAVENOUS at 01:03

## 2017-03-15 RX ADMIN — VANCOMYCIN HYDROCHLORIDE 1250 MG: 1 INJECTION, POWDER, LYOPHILIZED, FOR SOLUTION INTRAVENOUS at 03:03

## 2017-03-15 RX ADMIN — Medication 10 ML: at 11:03

## 2017-03-15 RX ADMIN — HEPARIN SODIUM 5000 UNITS: 5000 INJECTION, SOLUTION INTRAVENOUS; SUBCUTANEOUS at 01:03

## 2017-03-15 RX ADMIN — Medication 10 ML: at 05:03

## 2017-03-15 RX ADMIN — HEPARIN SODIUM 5000 UNITS: 5000 INJECTION, SOLUTION INTRAVENOUS; SUBCUTANEOUS at 09:03

## 2017-03-15 RX ADMIN — Medication 3 ML: at 05:03

## 2017-03-15 RX ADMIN — LEVOTHYROXINE SODIUM 25 MCG: 25 TABLET ORAL at 05:03

## 2017-03-15 RX ADMIN — STANDARDIZED SENNA CONCENTRATE AND DOCUSATE SODIUM 1 TABLET: 8.6; 5 TABLET, FILM COATED ORAL at 08:03

## 2017-03-15 RX ADMIN — CEFEPIME HYDROCHLORIDE 1 G: 1 INJECTION, POWDER, FOR SOLUTION INTRAMUSCULAR; INTRAVENOUS at 09:03

## 2017-03-15 RX ADMIN — VANCOMYCIN HYDROCHLORIDE 1000 MG: 1 INJECTION, POWDER, LYOPHILIZED, FOR SOLUTION INTRAVENOUS at 04:03

## 2017-03-15 RX ADMIN — CALCIUM 500 MG: 500 TABLET ORAL at 08:03

## 2017-03-15 RX ADMIN — DILTIAZEM HYDROCHLORIDE 60 MG: 60 TABLET, FILM COATED ORAL at 05:03

## 2017-03-15 RX ADMIN — POTASSIUM CHLORIDE 40 MEQ: 400 INJECTION, SOLUTION INTRAVENOUS at 06:03

## 2017-03-15 RX ADMIN — HYDRALAZINE HYDROCHLORIDE 10 MG: 20 INJECTION INTRAMUSCULAR; INTRAVENOUS at 04:03

## 2017-03-15 RX ADMIN — VITAMIN D, TAB 1000IU (100/BT) 1000 UNITS: 25 TAB at 08:03

## 2017-03-15 RX ADMIN — CALCIUM 500 MG: 500 TABLET ORAL at 05:03

## 2017-03-15 RX ADMIN — Medication 3 ML: at 09:03

## 2017-03-15 RX ADMIN — LABETALOL HYDROCHLORIDE 10 MG: 5 INJECTION, SOLUTION INTRAVENOUS at 12:03

## 2017-03-15 RX ADMIN — VITAMIN C 1 TABLET: TAB at 08:03

## 2017-03-15 RX ADMIN — ATORVASTATIN CALCIUM 40 MG: 20 TABLET, FILM COATED ORAL at 08:03

## 2017-03-15 RX ADMIN — METOPROLOL TARTRATE 5 MG: 1 INJECTION, SOLUTION INTRAVENOUS at 01:03

## 2017-03-15 RX ADMIN — DILTIAZEM HYDROCHLORIDE 30 MG: 30 TABLET, FILM COATED ORAL at 05:03

## 2017-03-15 RX ADMIN — LABETALOL HYDROCHLORIDE 10 MG: 5 INJECTION, SOLUTION INTRAVENOUS at 02:03

## 2017-03-15 RX ADMIN — CEFEPIME HYDROCHLORIDE 1 G: 1 INJECTION, POWDER, FOR SOLUTION INTRAMUSCULAR; INTRAVENOUS at 05:03

## 2017-03-15 RX ADMIN — LABETALOL HYDROCHLORIDE 10 MG: 5 INJECTION, SOLUTION INTRAVENOUS at 04:03

## 2017-03-15 RX ADMIN — IPRATROPIUM BROMIDE AND ALBUTEROL SULFATE 3 ML: .5; 3 SOLUTION RESPIRATORY (INHALATION) at 07:03

## 2017-03-15 NOTE — PLAN OF CARE
CM and SW met with patient's  and daughter to discuss SNF choices.  Per , [facility]   [facility]   [address] [facility]   [phone]  Fax [phone]  Cell [phone]    Hortencia Gates RN, CCRN-K, [facility]  Neuro-Critical Care   [phone]

## 2017-03-15 NOTE — PLAN OF CARE
Problem: SLP Goal  Goal: SLP Goal  Updated Speech Language Pathology Goals  Goals expected to be met by 3/22  1. Pt will participate in ongoing swallowing assessment to determine if safe to begin PO intake.  2. Pt will vocalize x1 during session.  3. Pt will follow simple, one step commands given max cues with 70% acc to improve overall receptive language  4. Pt will answer simple y/n questions in any modality with 70% acc to improve overall receptive language skills  5. Pt will identify objects in FO2 via eye gaze with 60% acc to improve overall receptive language skills      Speech Language Pathology Goals  Goals expected to be met by 3/14:  1. Pt will participate in ongoing swallowing assessment to determine if safe to begin PO intake.  2. Pt will participate in speech/language evaluation to determine further goals.      Outcome: Ongoing (interventions implemented as appropriate)  Pt participated w/ tx tasks.  Goals remain appropriate.  Cont ST per POC.     Ann Brantley CCC-SLP  3/15/2017

## 2017-03-15 NOTE — PLAN OF CARE
Ssc sent referral to :      [facility] [address] [place] [address] [phone], fax: [phone]      Diamante/jacque

## 2017-03-15 NOTE — PLAN OF CARE
Problem: Patient Care Overview  Goal: Plan of Care Review  Outcome: Ongoing (interventions implemented as appropriate)  No acute events over night.  Pt progressing towards goals.  Will continue to monitor.  See flowsheets for full assessment and VS info

## 2017-03-15 NOTE — PLAN OF CARE
MASHA spoke with admissions with [facility] ([phone]) regarding updates. Reported Pt is pending the Pegg tube. She reported that they cannot take Pt if she still has the NG tube. Sent updates via [employer]. They reported a rep from their sister facility will be completing the assessment today for potential acceptance.    MASHA contacted Vaishali Ramos with [facility] ([phone]) regarding Pt referral questions. Addressed questions and sent update via [employer]. They are very interested in accepting Pt.    MASHA also uploaded PASSR and 142 to [employer], and sent these documents to both facilities.    MASHA was advised by CM that Pt family would like meeting with herself and SW.    MASHA and CM met with the family. They reported wanting [facility] choice to [facility] ([address] P: [phone], cell: [phone], and fax: [phone]). MASHA sent [place] [facility] [place] complete [place] for this facility.    Gilma Mary LMSW  Neurocritical Care   [facility]  [phone]

## 2017-03-15 NOTE — PROGRESS NOTES
ICU Progress Note  Neurocritical Care    Admit Date: 3/7/2017  LOS: 8    CC: Embolic stroke involving left middle cerebral artery    SUBJECTIVE:     Interval History/Significant Events: HTS discontinued.  Continue serial sodium checks.  Increase vancomycin dose for pna.  General surgery consulted for PEG placement.       Review of Symptoms: anni, pt aphasic   Constitutional: Denies fevers or chills.  Pulmonary: Denies shortness of breath or cough.  Cardiology: Denies chest pain or palpitations.  GI: Denies abdominal pain or constipation.  Neurologic: Denies new weakness, headaches, or paresthesias.     Medications:  Continuous Infusions:     Scheduled Meds:   albuterol-ipratropium 2.5mg-0.5mg/3mL  3 mL Nebulization Q8H    aspirin  81 mg Per NG tube Daily    atorvastatin  40 mg Per NG tube Daily    B-complex with vitamin C  1 tablet Per NG tube Daily    calcium carbonate  500 mg Per NG tube BID WM    ceFEPime (MAXIPIME) IVPB  1 g Intravenous Q8H    diltiaZEM  60 mg Per NG tube Q6H    heparin (porcine)  5,000 Units Subcutaneous Q8H    levothyroxine  25 mcg Per NG tube Before breakfast    lisinopril  20 mg Per NG tube Daily    senna-docusate 8.6-50 mg  1 tablet Per NG tube BID    sodium chloride 0.9%  10 mL Intravenous Q6H    sodium chloride 0.9%  3 mL Intravenous Q8H    sodium chloride 3%  250 mL Intravenous Once    vancomycin (VANCOCIN) IVPB  1,250 mg Intravenous Q12H    vitamin D  1,000 Units Per NG tube Daily     PRN Meds:.acetaminophen, dextrose 50%, glucagon (human recombinant), hydrALAZINE, flu vacc [id number] 65yr up(PF), insulin aspart, magnesium sulfate IVPB, magnesium sulfate IVPB, pneumoc 13-evette conj-dip cr(PF), potassium chloride **AND** potassium chloride **AND** potassium chloride, Flushing PICC Protocol **AND** sodium chloride 0.9% **AND** sodium chloride 0.9%, sodium phosphate IVPB, sodium phosphate IVPB, sodium phosphate IVPB    OBJECTIVE:     I & O (24h):    Intake/Output Summary (Last  24 hours) at 03/15/17 1154  Last data filed at 03/15/17 1105   Gross per 24 hour   Intake          2914.17 ml   Output             1670 ml   Net          1244.17 ml       Physical Exam:  Last Vitals:  Vitals:    03/15/17 1107   BP:    Pulse: (!) 123   Resp: (!) 21   Temp:      Vital Signs (24h Range):   Temp:  [97.9 °F (36.6 °C)-98.6 °F (37 °C)] 98.6 °F (37 °C)  Pulse:  [] 123  Resp:  [17-41] 21  SpO2:  [95 %-99 %] 96 %  BP: (132-179)/() 157/78  GA: More alert on PE, no acute distress.   HEENT: No scleral icterus or JVD.   Pulmonary: Clear to auscultation A/P/L. No wheezing, crackles, or rhonchi.  Cardiac: RRR S1 & S2 w/o rubs/murmurs/gallops.   Abdominal: Bowel sounds present x 4. No appreciable hepatosplenomegaly.  Skin: No jaundice, rashes, or visible lesions.  Neuro:  --GCS: E3 V1 M6  --Mental Status:  Globally aphasic  --CN II-XII grossly intact.   --Pupils 3mm, PERRL.   --Corneal reflex, gag, cough intact.  --moves left extremities spontaneously  --withdraws to pain in right extremities     Invasive Lines:  --Central Line:        PICC Double Lumen 03/08/17 1208 left brachial (Active)   Site Assessment Clean;Dry;Intact;No redness;No swelling 3/13/2017  7:01 AM   Lumen 1 Status Flushed;Blood return noted;Infusing 3/13/2017  7:01 AM   Lumen 2 Status Infusing 3/13/2017  7:01 AM   Length verónica (cm) 36 cm 3/8/2017 12:05 PM   Current Exposed Catheter (cm) 0 cm 3/8/2017 12:05 PM   Extremity Circumference (cm) 25 cm 3/8/2017 12:05 PM   Dressing Type Transparent 3/13/2017  7:01 AM   Dressing Status Biopatch in place;Clean;Dry;Intact 3/13/2017  7:01 AM   Dressing Intervention Dressing reinforced 3/13/2017  7:01 AM   Dressing Change Due 03/15/17 3/13/2017  7:01 AM   Daily Line Review Performed 3/13/2017  7:01 AM       --Arterial Line:      --Surgical Drains/Mace:        NG/OG Tube 03/08/17 0615 Rodney arroyo 14 Fr. Right nostril (Active)   Placement Check placement verified by distal tube length  "measurement;placement verified by x-ray 3/13/2017  7:01 AM   Distal Tube Length (cm) 63 3/10/2017  3:05 AM   Tolerance no signs/symptoms of discomfort 3/13/2017  7:01 AM   Securement anchored to nostril center w/ adhesive device 3/13/2017  7:01 AM   Clamp Status/Tolerance unclamped;no abdominal discomfort;no abdominal distention;no emesis;no nausea;no residual;no restlessness 3/13/2017  7:01 AM   Insertion Site Appearance no redness, warmth, tenderness, skin breakdown, drainage 3/13/2017  7:01 AM   Flush/Irrigation flushed w/;water;no resistance met 3/13/2017  7:01 AM   Feeding Method continuous 3/13/2017  7:01 AM   Current Rate (mL/hr) 40 mL/hr 3/13/2017  7:01 AM   Goal Rate (mL/hr) 40 mL/hr 3/13/2017  7:01 AM   Intake (mL) 150 mL 3/13/2017  8:00 AM   Intake (mL) - Formula Tube Feeding 40 3/13/2017 12:00 PM   Residual Amount (ml) 10 ml 3/12/2017  7:01 AM            Urethral Catheter 03/12/17 1300 (Active)   Site Assessment Clean;Intact 3/13/2017  7:01 AM   Collection Container Urimeter 3/13/2017  7:01 AM   Securement Method secured to top of thigh w/ adhesive device 3/13/2017  7:01 AM   Catheter Care Performed yes 3/13/2017  7:01 AM   Reason for Continuing Urinary Catheterization Critically ill in ICU requiring intensive monitoring 3/13/2017  7:01 AM   CAUTI Prevention Bundle StatLock in place w 1" slack;Intact seal between catheter & drainage tubing;Drainage bag off the floor;Green sheeting clip in use;No dependent loops or kinks;Drainage bag not overfilled (<2/3 full);Drainage bag below bladder 3/13/2017  7:01 AM   Output (mL) 450 mL 3/13/2017 12:00 PM       Nutrition: TF     Labs:  ABG:     Recent Labs  Lab 03/15/17  1103   PH 7.481*   PO2 80   PCO2 26.3*   HCO3 19.6*   POCSATURATED 97   BE -4       BMP:    Recent Labs  Lab 03/15/17  0329 03/15/17  0551   * 154*   K 3.8  --    *  --    CO2 21*  --    BUN 26*  --    CREATININE 0.7  --    *  --    MG 2.2  --    PHOS 2.9  --        LFT:   Lab " Results   Component Value Date    AST 39 03/15/2017    ALT 29 03/15/2017    ALKPHOS 50 (L) 03/15/2017    BILITOT 0.3 03/15/2017    ALBUMIN 2.2 (L) 03/15/2017    PROT 5.9 (L) 03/15/2017       CBC:   Lab Results   Component Value Date    WBC 14.43 (H) 03/15/2017    HGB 9.7 (L) 03/15/2017    HCT 30.4 (L) 03/15/2017     (H) 03/15/2017     03/15/2017       Microbiology x 7d:   Microbiology Results (last 7 days)     Procedure Component Value Units Date/Time    Culture, Respiratory [[id number]] Collected:  03/13/17 1608    Order Status:  Completed Specimen:  Respiratory from Sputum Updated:  03/15/17 1007     Respiratory Culture Normal respiratory richard     Gram Stain (Respiratory) <10 epithelial cells per low power field.     Gram Stain (Respiratory) Rare WBC's     Gram Stain (Respiratory) Few Gram positive cocci     Gram Stain (Respiratory) Rare budding yeast     Gram Stain (Respiratory) Rare Gram negative rods          Imaging:  I have personally reviewed.     ASSESSMENT/PLAN:     Patient Active Problem List   Diagnosis    Hypothyroidism    Essential hypertension    Paroxysmal atrial fibrillation    Hyperlipidemia    Embolic stroke involving left middle cerebral artery    Cytotoxic cerebral edema    Right flaccid hemiplegia    Pleural effusion, bilateral    Pulmonary hypertension    Brain compression       Neuro:  L MCA/GIRMA infarct  --repeat CT head with slight decrease in midline shift from prior study   --continue asa   --d/c HTS, continue q 6 hour Na checks, goal 140-150  --continue atorvastatin     Pulmonary:  --NC prn    Cardiac:  Atrial fibrillation  --increase dilt to 60 mg qid    HTN  ---160 in setting of hemorrhagic conversion   --begin lisinopril 20 mg daily     Renal:   --BUN, creat stable  --24 hour net I/O: +1.5L    ID:  --cefepime and vanc for pna, increase vanc dose to 1250 mg bid   --afebrile   --WBC 14    Hem/Onc:  --H/H decreased but stable  --platelets  169    Endocrine:  --A1C 5.9  --TSH 0.109, free T4 1.23  --continue levothyroxine 25 mcg daily     Fluids/Electrolytes/Nutrition/GI:  --d/c HTS  --continue to check Na q 6 hours  --TF  --general surgery consulted for PEG     Level III    Lissa Renner PA-C  Neuro Critical Care  [id number]

## 2017-03-15 NOTE — PLAN OF CARE
Problem: Patient Care Overview  Goal: Plan of Care Review  Outcome: Ongoing (interventions implemented as appropriate)  POC reviewed with pt and family at 1400. Questions and concerns addressed. No acute events today. Consent obtained for PEG tube placement tomorrow. Patient remains in Afib. Pt progressing toward goals. Will continue to monitor. See flowsheets for full assessment and VS info.

## 2017-03-15 NOTE — PT/OT/SLP PROGRESS
"Speech Language Pathology  Treatment    Zena Aguirre   MRN: [id number]   Admitting Diagnosis: Embolic stroke involving left middle cerebral artery    Diet recommendations: Solid Diet Level: NPO  Liquid Diet Level: NPO cont alternative means of nutrition/hydration via NG    SLP Treatment Date: 03/15/17  Speech Start Time: 1330     Speech Stop Time: 1354     Speech Total (min): 24 min       TREATMENT BILLABLE MINUTES:  Speech Therapy Individual 12 and Treatment Swallowing Dysfunction 12    Has the patient been evaluated by SLP for swallowing? : Yes  Keep patient NPO?: Yes   General Precautions: Standard, aphasia, aspiration, fall, NPO  Current Respiratory Status: other (comment) (room air)       Subjective:  "Do you think she could read?"  Pt's spouse asked SLP at onset of tx session    Pain Ratin/10              Pain Rating Post-Intervention: 0/10    Objective:   Patient found with: blood pressure cuff, telemetry, pulse ox (continuous), SCD, sesay catheter, peripheral IV    Pt was able to produce some voicing w/ singing in unison w/ SLP w/ common songs (happy birthday) w/ mouthing of words.  No true words produced by pt.  Spontaneous voicing produced x1 following oral care.  Pt answered no basic yes/no questions via any modality.  She followed no single directives despite max cues.    She was provided oral care w/ moistened oral swabs to gums, teeth, cheeks and tongue.  Pt attempted to suck oral swabs for moisture.  She was able to elicit swallow reflex w/ oral care and spontaneously.  Swallow reflex speed remains diminished.  Pt was provided single small ice chip x1 for ongoing swallow assessment.  Pt demonstrated fair oral bolus control and manipulation, but bolus transit was reduced.  Once pt had chewed and melted the ice she was no longer aware of the ice/thin liquid.  She did elicit swallow following significant delay given max cues.  Additional trials were deferred.      FIM:                             "     Assessment:  Zena Aguirre is a 81 y.o. female with a medical diagnosis of Embolic stroke involving left middle cerebral artery and presents with aphasia, apraxia, dysarthria and dysphagia.    Discharge recommendations: Discharge Facility/Level Of Care Needs: nursing facility, skilled     Goals:   SLP Goals        Problem: SLP Goal    Goal Priority Disciplines Outcome   SLP Goal     SLP Ongoing (interventions implemented as appropriate)   Description:  Updated Speech Language Pathology Goals  Goals expected to be met by 3/22  1. Pt will participate in ongoing swallowing assessment to determine if safe to begin PO intake.  2. Pt will vocalize x1 during session.  3. Pt will follow simple, one step commands given max cues with 70% acc to improve overall receptive language  4. Pt will answer simple y/n questions in any modality with 70% acc to improve overall receptive language skills  5. Pt will identify objects in FO2 via eye gaze with 60% acc to improve overall receptive language skills      Speech Language Pathology Goals  Goals expected to be met by 3/14:  1. Pt will participate in ongoing swallowing assessment to determine if safe to begin PO intake.  2. Pt will participate in speech/language evaluation to determine further goals.                     Plan:   Patient to be seen Therapy Frequency: 5 x/week   Plan of Care expires: 04/05/17  Plan of Care reviewed with: patient, spouse, daughter  SLP Follow-up?: Yes              Ann Brantley CCC-SLP  03/15/2017

## 2017-03-15 NOTE — CONSULTS
[facility]  General Surgery  Consult Note    Inpatient consult to General Surgery  Consult performed by: JOCY OBREGON  Consult ordered by: IRENE ROPER  Reason for consult: PEG        Subjective:     Chief Complaint/Reason for Admission: Embolic stroke involving left middle cerebral artery    History of Present Illness:   80 yo female w pmhx of HTN, HLD, hypothyroidism and recently diagnosed afib (about 1 month ago, not on oral anticoagulation), who presented with decreased responsiveness and R hemiparesis. Was given tPA. CTA MP revealed L M1 occlusion but with hypodensities already visualized on plain CT. [place] [facility] [place], deemed not a surgical candidate. She has been working with SLP who recommend NPO with alternative means of nutrition. General surgery has been consulted for PEG placement.    No current facility-administered medications on file prior to encounter.      No current outpatient prescriptions on file prior to encounter.       Review of patient's allergies indicates:   Allergen Reactions    Sulfa (sulfonamide antibiotics) Itching       Past Medical History:   Diagnosis Date    Essential hypertension 3/7/2017    Hyperlipidemia 3/7/2017    Hypothyroidism 3/7/2017    Paroxysmal atrial fibrillation 3/7/2017     History reviewed. No pertinent surgical history.  Family History     None        Social History Main Topics    Smoking status: Never Smoker    Smokeless tobacco: Never Used    Alcohol use No    Drug use: No    Sexual activity: Not Currently     Partners: Male     Birth control/ protection: None     Review of Systems   Unable to perform ROS: Patient nonverbal     Objective:     Vital Signs (Most Recent):  Temp: 98.6 °F (37 °C) (03/15/17 1105)  Pulse: (!) 119 (03/15/17 1305)  Resp: 20 (03/15/17 1305)  BP: (!) 149/70 (03/15/17 1305)  SpO2: 96 % (03/15/17 1305) Vital Signs (24h Range):  Temp:  [97.9 °F (36.6 °C)-98.6 °F (37 °C)] 98.6 °F  (37 °C)  Pulse:  [] 119  Resp:  [17-41] 20  SpO2:  [95 %-99 %] 96 %  BP: (132-179)/() 149/70     Weight: 67.1 kg (147 lb 14.9 oz)  Body mass index is 27.06 kg/(m^2).      Intake/Output Summary (Last 24 hours) at 03/15/17 1350  Last data filed at 03/15/17 1305   Gross per 24 hour   Intake          2744.17 ml   Output             1600 ml   Net          1144.17 ml       Physical Exam   Constitutional: She appears well-developed and well-nourished. No distress.   HENT:   Head: Normocephalic and atraumatic.   Eyes: EOM are normal.   Neck: Normal range of motion. Neck supple.   Cardiovascular: Normal rate and regular rhythm.    Pulmonary/Chest: Effort normal. No respiratory distress.   Abdominal:   Soft, NT, mild distension, possible hernia vs diastasis in upper epigastric area, no previous abdominal surgical scars noted   Musculoskeletal: She exhibits no edema or tenderness.   Neurological: She is alert.   Skin: Skin is warm and dry.       Significant Labs:  ABGs:   Recent Labs  Lab 03/15/17  1103   PH 7.481*   PCO2 26.3*   PO2 80   HCO3 19.6*   POCSATURATED 97   BE -4     BMP:   Recent Labs  Lab 03/15/17  0329  03/15/17  1103   *  --   --    *  < > 152*   K 3.8  --  4.4   *  --   --    CO2 21*  --   --    BUN 26*  --   --    CREATININE 0.7  --   --    CALCIUM 7.8*  --   --    MG 2.2  --   --    < > = values in this interval not displayed.  CBC:   Recent Labs  Lab 03/15/17  0329   WBC 14.43*   RBC 2.97*   HGB 9.7*   HCT 30.4*      *   MCH 32.7*   MCHC 31.9*     CMP:   Recent Labs  Lab 03/15/17  0329  03/15/17  1103   *  --   --    CALCIUM 7.8*  --   --    ALBUMIN 2.2*  --   --    PROT 5.9*  --   --    *  < > 152*   K 3.8  --  4.4   CO2 21*  --   --    *  --   --    BUN 26*  --   --    CREATININE 0.7  --   --    ALKPHOS 50*  --   --    ALT 29  --   --    AST 39  --   --    BILITOT 0.3  --   --    < > = values in this interval not displayed.  Coagulation:    Recent Labs  Lab 03/14/17  0232   INR 1.0     LFTs:   Recent Labs  Lab 03/15/17  0329   ALT 29   AST 39   ALKPHOS 50*   BILITOT 0.3   PROT 5.9*   ALBUMIN 2.2*     All pertinent labs from the last 24 hours have been reviewed.    Assessment/Plan:   82 yo female s/p embolic stroke of L MCA, with persistent dysphagia  -plan for PEG placement tomorrow at the bedside 3/16/17  -NPO/hold tube feeds p MN 3/16/17  -consents to be obtained  -discussed with staff    Thank you for your consult. I will follow-up with patient. Please contact us if you have any additional questions.    Dana Sal PA-C   [id number]  General Surgery  [facility]

## 2017-03-16 LAB
ALBUMIN SERPL BCP-MCNC: 2.1 G/DL
ALP SERPL-CCNC: 51 U/L
ALT SERPL W/O P-5'-P-CCNC: 34 U/L
ANION GAP SERPL CALC-SCNC: 9 MMOL/L
AST SERPL-CCNC: 41 U/L
BACTERIA SPEC AEROBE CULT: NORMAL
BASOPHILS # BLD AUTO: 0.02 K/UL
BASOPHILS NFR BLD: 0.1 %
BILIRUB SERPL-MCNC: 0.4 MG/DL
BUN SERPL-MCNC: 31 MG/DL
CALCIUM SERPL-MCNC: 8.3 MG/DL
CHLORIDE SERPL-SCNC: 122 MMOL/L
CO2 SERPL-SCNC: 19 MMOL/L
CREAT SERPL-MCNC: 0.7 MG/DL
DIFFERENTIAL METHOD: ABNORMAL
EOSINOPHIL # BLD AUTO: 0.1 K/UL
EOSINOPHIL NFR BLD: 0.7 %
ERYTHROCYTE [DISTWIDTH] IN BLOOD BY AUTOMATED COUNT: 14.4 %
EST. GFR  (AFRICAN AMERICAN): >60 ML/MIN/1.73 M^2
EST. GFR  (NON AFRICAN AMERICAN): >60 ML/MIN/1.73 M^2
GLUCOSE SERPL-MCNC: 119 MG/DL
GRAM STN SPEC: NORMAL
HCT VFR BLD AUTO: 29.1 %
HGB BLD-MCNC: 9.2 G/DL
LYMPHOCYTES # BLD AUTO: 1.9 K/UL
LYMPHOCYTES NFR BLD: 13.5 %
MAGNESIUM SERPL-MCNC: 1.9 MG/DL
MCH RBC QN AUTO: 32.7 PG
MCHC RBC AUTO-ENTMCNC: 31.6 %
MCV RBC AUTO: 104 FL
MONOCYTES # BLD AUTO: 1.9 K/UL
MONOCYTES NFR BLD: 13.8 %
NEUTROPHILS # BLD AUTO: 10 K/UL
NEUTROPHILS NFR BLD: 71.3 %
PHOSPHATE SERPL-MCNC: 2.8 MG/DL
PLATELET # BLD AUTO: 204 K/UL
PMV BLD AUTO: 10.3 FL
POCT GLUCOSE: 112 MG/DL (ref 70–110)
POCT GLUCOSE: 117 MG/DL (ref 70–110)
POCT GLUCOSE: 127 MG/DL (ref 70–110)
POCT GLUCOSE: 146 MG/DL (ref 70–110)
POCT GLUCOSE: 88 MG/DL (ref 70–110)
POCT GLUCOSE: 89 MG/DL (ref 70–110)
POTASSIUM SERPL-SCNC: 3.8 MMOL/L
PROT SERPL-MCNC: 5.8 G/DL
RBC # BLD AUTO: 2.81 M/UL
SODIUM SERPL-SCNC: 143 MMOL/L
SODIUM SERPL-SCNC: 145 MMOL/L
SODIUM SERPL-SCNC: 147 MMOL/L
SODIUM SERPL-SCNC: 148 MMOL/L
SODIUM SERPL-SCNC: 150 MMOL/L
VANCOMYCIN TROUGH SERPL-MCNC: 16.6 UG/ML
WBC # BLD AUTO: 14.07 K/UL

## 2017-03-16 PROCEDURE — 25000003 PHARM REV CODE 250: Performed by: PSYCHIATRY & NEUROLOGY

## 2017-03-16 PROCEDURE — 25000003 PHARM REV CODE 250: Performed by: PHYSICIAN ASSISTANT

## 2017-03-16 PROCEDURE — 20000000 HC ICU ROOM

## 2017-03-16 PROCEDURE — 27201018 HC KIT, PEG (ANY)

## 2017-03-16 PROCEDURE — 84295 ASSAY OF SERUM SODIUM: CPT | Mod: 91

## 2017-03-16 PROCEDURE — 94668 MNPJ CHEST WALL SBSQ: CPT

## 2017-03-16 PROCEDURE — 97803 MED NUTRITION INDIV SUBSEQ: CPT

## 2017-03-16 PROCEDURE — 80053 COMPREHEN METABOLIC PANEL: CPT

## 2017-03-16 PROCEDURE — 97110 THERAPEUTIC EXERCISES: CPT

## 2017-03-16 PROCEDURE — 63600175 PHARM REV CODE 636 W HCPCS: Performed by: PHYSICIAN ASSISTANT

## 2017-03-16 PROCEDURE — 25000003 PHARM REV CODE 250

## 2017-03-16 PROCEDURE — 0DH63UZ INSERTION OF FEEDING DEVICE INTO STOMACH, PERCUTANEOUS APPROACH: ICD-10-PCS | Performed by: SURGERY

## 2017-03-16 PROCEDURE — 83735 ASSAY OF MAGNESIUM: CPT

## 2017-03-16 PROCEDURE — 85025 COMPLETE CBC W/AUTO DIFF WBC: CPT

## 2017-03-16 PROCEDURE — 94640 AIRWAY INHALATION TREATMENT: CPT

## 2017-03-16 PROCEDURE — 80202 ASSAY OF VANCOMYCIN: CPT

## 2017-03-16 PROCEDURE — 99291 CRITICAL CARE FIRST HOUR: CPT | Mod: ,,, | Performed by: PSYCHIATRY & NEUROLOGY

## 2017-03-16 PROCEDURE — 84100 ASSAY OF PHOSPHORUS: CPT

## 2017-03-16 PROCEDURE — 25000242 PHARM REV CODE 250 ALT 637 W/ HCPCS: Performed by: PHYSICIAN ASSISTANT

## 2017-03-16 PROCEDURE — 63600175 PHARM REV CODE 636 W HCPCS

## 2017-03-16 PROCEDURE — 43246 EGD PLACE GASTROSTOMY TUBE: CPT | Mod: ,,, | Performed by: SURGERY

## 2017-03-16 RX ORDER — LIDOCAINE HYDROCHLORIDE 10 MG/ML
1 INJECTION INFILTRATION; PERINEURAL ONCE
Status: COMPLETED | OUTPATIENT
Start: 2017-03-16 | End: 2017-03-16

## 2017-03-16 RX ORDER — FENTANYL CITRATE 50 UG/ML
25 INJECTION, SOLUTION INTRAMUSCULAR; INTRAVENOUS ONCE
Status: COMPLETED | OUTPATIENT
Start: 2017-03-16 | End: 2017-03-16

## 2017-03-16 RX ORDER — METOPROLOL TARTRATE 1 MG/ML
5 INJECTION, SOLUTION INTRAVENOUS ONCE
Status: COMPLETED | OUTPATIENT
Start: 2017-03-16 | End: 2017-03-16

## 2017-03-16 RX ORDER — SODIUM CHLORIDE 9 MG/ML
INJECTION, SOLUTION INTRAVENOUS CONTINUOUS
Status: DISCONTINUED | OUTPATIENT
Start: 2017-03-16 | End: 2017-03-17

## 2017-03-16 RX ORDER — FENTANYL CITRATE 50 UG/ML
INJECTION, SOLUTION INTRAMUSCULAR; INTRAVENOUS
Status: COMPLETED
Start: 2017-03-16 | End: 2017-03-16

## 2017-03-16 RX ORDER — LIDOCAINE HYDROCHLORIDE 10 MG/ML
INJECTION INFILTRATION; PERINEURAL
Status: COMPLETED
Start: 2017-03-16 | End: 2017-03-16

## 2017-03-16 RX ORDER — ROCURONIUM BROMIDE 10 MG/ML
50 INJECTION, SOLUTION INTRAVENOUS
Status: DISCONTINUED | OUTPATIENT
Start: 2017-03-16 | End: 2017-03-21

## 2017-03-16 RX ORDER — MIDAZOLAM HYDROCHLORIDE 1 MG/ML
6 INJECTION INTRAMUSCULAR; INTRAVENOUS
Status: DISCONTINUED | OUTPATIENT
Start: 2017-03-16 | End: 2017-03-22 | Stop reason: HOSPADM

## 2017-03-16 RX ORDER — FENTANYL CITRATE 50 UG/ML
200 INJECTION, SOLUTION INTRAMUSCULAR; INTRAVENOUS
Status: DISCONTINUED | OUTPATIENT
Start: 2017-03-16 | End: 2017-03-16

## 2017-03-16 RX ORDER — METOPROLOL TARTRATE 1 MG/ML
INJECTION, SOLUTION INTRAVENOUS
Status: COMPLETED
Start: 2017-03-16 | End: 2017-03-16

## 2017-03-16 RX ORDER — METOPROLOL TARTRATE 1 MG/ML
5 INJECTION, SOLUTION INTRAVENOUS ONCE
Status: DISCONTINUED | OUTPATIENT
Start: 2017-03-16 | End: 2017-03-20

## 2017-03-16 RX ADMIN — Medication 3 ML: at 01:03

## 2017-03-16 RX ADMIN — Medication 3 ML: at 06:03

## 2017-03-16 RX ADMIN — HYDRALAZINE HYDROCHLORIDE 10 MG: 20 INJECTION INTRAMUSCULAR; INTRAVENOUS at 10:03

## 2017-03-16 RX ADMIN — DILTIAZEM HYDROCHLORIDE 60 MG: 60 TABLET, FILM COATED ORAL at 11:03

## 2017-03-16 RX ADMIN — LIDOCAINE HYDROCHLORIDE 1 ML: 10 INJECTION, SOLUTION INFILTRATION; PERINEURAL at 12:03

## 2017-03-16 RX ADMIN — METOPROLOL TARTRATE 5 MG: 1 INJECTION, SOLUTION INTRAVENOUS at 08:03

## 2017-03-16 RX ADMIN — CEFEPIME HYDROCHLORIDE 1 G: 1 INJECTION, POWDER, FOR SOLUTION INTRAMUSCULAR; INTRAVENOUS at 05:03

## 2017-03-16 RX ADMIN — VANCOMYCIN HYDROCHLORIDE 1250 MG: 1 INJECTION, POWDER, LYOPHILIZED, FOR SOLUTION INTRAVENOUS at 02:03

## 2017-03-16 RX ADMIN — METOPROLOL TARTRATE 5 MG: 5 INJECTION INTRAVENOUS at 04:03

## 2017-03-16 RX ADMIN — IPRATROPIUM BROMIDE AND ALBUTEROL SULFATE 3 ML: .5; 3 SOLUTION RESPIRATORY (INHALATION) at 07:03

## 2017-03-16 RX ADMIN — DILTIAZEM HYDROCHLORIDE 60 MG: 60 TABLET, FILM COATED ORAL at 06:03

## 2017-03-16 RX ADMIN — Medication 10 ML: at 11:03

## 2017-03-16 RX ADMIN — CEFEPIME HYDROCHLORIDE 1 G: 1 INJECTION, POWDER, FOR SOLUTION INTRAMUSCULAR; INTRAVENOUS at 01:03

## 2017-03-16 RX ADMIN — CALCIUM 500 MG: 500 TABLET ORAL at 06:03

## 2017-03-16 RX ADMIN — FENTANYL CITRATE 25 MCG: 50 INJECTION INTRAMUSCULAR; INTRAVENOUS at 05:03

## 2017-03-16 RX ADMIN — IPRATROPIUM BROMIDE AND ALBUTEROL SULFATE 3 ML: .5; 3 SOLUTION RESPIRATORY (INHALATION) at 04:03

## 2017-03-16 RX ADMIN — DILTIAZEM HYDROCHLORIDE 60 MG: 60 TABLET, FILM COATED ORAL at 05:03

## 2017-03-16 RX ADMIN — MIDAZOLAM HYDROCHLORIDE 1 MG: 1 INJECTION, SOLUTION INTRAMUSCULAR; INTRAVENOUS at 12:03

## 2017-03-16 RX ADMIN — LIDOCAINE HYDROCHLORIDE 1 ML: 10 INJECTION INFILTRATION; PERINEURAL at 12:03

## 2017-03-16 RX ADMIN — POTASSIUM CHLORIDE 40 MEQ: 400 INJECTION, SOLUTION INTRAVENOUS at 06:03

## 2017-03-16 RX ADMIN — CEFEPIME HYDROCHLORIDE 1 G: 1 INJECTION, POWDER, FOR SOLUTION INTRAMUSCULAR; INTRAVENOUS at 09:03

## 2017-03-16 RX ADMIN — HEPARIN SODIUM 5000 UNITS: 5000 INJECTION, SOLUTION INTRAVENOUS; SUBCUTANEOUS at 09:03

## 2017-03-16 RX ADMIN — STANDARDIZED SENNA CONCENTRATE AND DOCUSATE SODIUM 1 TABLET: 8.6; 5 TABLET, FILM COATED ORAL at 09:03

## 2017-03-16 RX ADMIN — HEPARIN SODIUM 5000 UNITS: 5000 INJECTION, SOLUTION INTRAVENOUS; SUBCUTANEOUS at 01:03

## 2017-03-16 RX ADMIN — METOPROLOL TARTRATE 5 MG: 1 INJECTION, SOLUTION INTRAVENOUS at 04:03

## 2017-03-16 RX ADMIN — METOPROLOL TARTRATE 5 MG: 5 INJECTION INTRAVENOUS at 08:03

## 2017-03-16 RX ADMIN — VANCOMYCIN HYDROCHLORIDE 1250 MG: 1 INJECTION, POWDER, LYOPHILIZED, FOR SOLUTION INTRAVENOUS at 03:03

## 2017-03-16 RX ADMIN — Medication 10 ML: at 06:03

## 2017-03-16 RX ADMIN — Medication 3 ML: at 09:03

## 2017-03-16 RX ADMIN — FENTANYL CITRATE 25 MCG: 50 INJECTION INTRAMUSCULAR; INTRAVENOUS at 12:03

## 2017-03-16 RX ADMIN — SODIUM CHLORIDE: 0.9 INJECTION, SOLUTION INTRAVENOUS at 10:03

## 2017-03-16 NOTE — PT/OT/SLP PROGRESS
Occupational Therapy  Treatment    Zena Aguirre   MRN: [id number]   Admitting Diagnosis: Embolic stroke involving left middle cerebral artery    OT Date of Treatment: 17   OT Start Time: 924  OT Stop Time: 945  OT Total Time (min): 21 min    Billable Minutes:  Therapeutic Exercise 21    General Precautions: Standard, aspiration, fall, NPO (DNR)    Do you have any cultural, spiritual, Judaism conflicts, given your current situation?: Sabianism    Subjective:  Communicated with RN prior to session.  Pt with no attempts to interact during session.    Pain Ratin/10  Pain Rating Post-Intervention: 0/10    Objective:  Patient found with: arterial line, blood pressure cuff, pulse ox (continuous), NG tube, telemetry, sesay catheter, SCD, pressure relief boots, peripheral IV     Functional Mobility:  Bed Mobility: NCC MD (Caty) requested continued in bed only activities with therapy at this time upon discussion with OT on this date.       Therapeutic Activities and Exercises:  Provided AAROM with LUE & LLE & PROM with RUE & RLE in all available planes x 10 reps each while supine.  Pt with noted tightness of RUE & right ankle therefore provided gentle stretch at end ranges.  Pt with tracking visually in (B) directions.  Pt followed 1/4 one step commands.  Pt with eyes open 90% of session.  Pt's family had no further questions & when asked whether there were any concerns pt's family reported none.      AM-PAC 6 CLICK ADL   How much help from another person does this patient currently need?   1 = Unable, Total/Dependent Assistance  2 = A lot, Maximum/Moderate Assistance  3 = A little, Minimum/Contact Guard/Supervision  4 = None, Modified Collier/Independent    Putting on and taking off regular lower body clothing? : 1  Bathing (including washing, rinsing, drying)?: 1  Toileting, which includes using toilet, bedpan, or urinal? : 1  Putting on and taking off regular upper body clothing?: 1  Taking care of  personal grooming such as brushing teeth?: 1  Eating meals?: 1  Total Score: 6     AM-PAC Raw Score CMS G-Code Modifier Level of Impairment Assistance   6 % Total / Unable   7 - 9 CM 80 - 100% Maximal Assist   10 - 14 CL 60 - 80% Moderate Assist   15 - 19 CK 40 - 60% Moderate Assist   20 - 22 CJ 20 - 40% Minimal Assist   23 CI 1-20% SBA / CGA   24 CH 0% Independent/ Mod I     Patient left supine with all lines intact, call button in reach, RN notified, family present and white board updated.    ASSESSMENT:  Zena Aguirre is a 81 y.o. female with a medical diagnosis of Embolic stroke involving left middle cerebral artery and presents with limited session due to pt restricted to in bed activities only at this time.  Pt with fair participation during activities on this date.    Rehab identified problem list/impairments: Rehab identified problem list/impairments: weakness, impaired endurance, impaired sensation, impaired self care skills, visual deficits, impaired balance, impaired functional mobilty, impaired cognition, decreased safety awareness, decreased coordination, decreased upper extremity function, decreased lower extremity function    Rehab potential is fair.    Activity tolerance: Fair    Discharge recommendations: Discharge Facility/Level Of Care Needs: nursing facility, skilled     GOALS:   Occupational Therapy Goals        Problem: Occupational Therapy Goal    Goal Priority Disciplines Outcome Interventions   Occupational Therapy Goal     OT, PT/OT Ongoing (interventions implemented as appropriate)    Description:  Goals to be met by: 3/23/17     Patient will increase functional independence with ADLs by performing:    UE Dressing with Moderate Assistance.  LE Dressing with Maximum Assistance.  Grooming while seated with Moderate Assistance.  Toileting from bedside commode with Maximum Assistance for hygiene and clothing management.   Rolling to Bilateral with Moderate Assistance.   Supine to sit  with Maximum Assistance.  Stand pivot transfers with Maximum Assistance.  Pt will follow 4/4 one step commands.                 Plan:  Patient to be seen 5 x/week to address the above listed problems via self-care/home management, neuromuscular re-education, cognitive retraining, sensory integration, therapeutic activities, therapeutic exercises  Plan of Care expires: 04/07/17  Plan of Care reviewed with: patient, spouse         VERONA Hidalgo  03/16/2017

## 2017-03-16 NOTE — PLAN OF CARE
Problem: SLP Goal  Goal: SLP Goal  Updated Speech Language Pathology Goals  Goals expected to be met by 3/22  1. Pt will participate in ongoing swallowing assessment to determine if safe to begin PO intake.  2. Pt will vocalize x1 during session.  3. Pt will follow simple, one step commands given max cues with 70% acc to improve overall receptive language  4. Pt will answer simple y/n questions in any modality with 70% acc to improve overall receptive language skills  5. Pt will identify objects in FO2 via eye gaze with 60% acc to improve overall receptive language skills      Speech Language Pathology Goals  Goals expected to be met by 3/14:  1. Pt will participate in ongoing swallowing assessment to determine if safe to begin PO intake.  2. Pt will participate in speech/language evaluation to determine further goals.      Outcome: Ongoing (interventions implemented as appropriate)  Pt too somnolent to participate in ST due to being sedated earlier for PEG tube placement.  Max efforts made to rouse pt and elicit responses, but SLP was unsuccessful. Will f/u on 3/17. SAMINA Guzman, CCC-SLP  Speech Language Pathologist  [phone]  3/16/2017

## 2017-03-16 NOTE — PLAN OF CARE
Problem: Patient Care Overview  Goal: Plan of Care Review  Outcome: Ongoing (interventions implemented as appropriate)  No acute events throughout the day, VS and assessment per flow sheet, patient progressing towards goal as tolerated. NCC consulted.  Peg tube placed.  Medications postponed until 1800.  Patient has NS at 50ml/h.  Patient has had episodic tachycardia and A Fib throughout shift.  Plan of care reviewed with Zena Aguirre and family, all concerns addressed. Will continue to monitor.

## 2017-03-16 NOTE — PLAN OF CARE
MASHA received call from Radha with [facility] ([phone]) regarding questions about Pt. SW left message to return call.    Gilma Mary LMSW  Neurocritical Care   [facility]  [id number]

## 2017-03-16 NOTE — PT/OT/SLP PROGRESS
Speech Language Pathology  Treatment    Zena Aguirre   MRN: [id number]   Admitting Diagnosis: Embolic stroke involving left middle cerebral artery    Diet recommendations: Solid Diet Level: NPO  Liquid Diet Level: NPO PEG tube    SLP Treatment Date: 03/16/17  Speech Start Time: 1447     Speech Stop Time: 1452     Speech Total (min): 5 min       TREATMENT BILLABLE MINUTES:  No charge posted today.    Has the patient been evaluated by SLP for swallowing? : Yes  Keep patient NPO?: Yes   General Precautions: Standard, aphasia, aspiration, fall, NPO  Current Respiratory Status: other (comment) (room air)       Subjective:  Pt was nonverbal/nonvocal.                        Objective:      Pt sedated earlier today for PEG placement.  Still very lethargic, but rousing to name and tactile stimulation, though unable to maintain alertness despite max efforts from SLP. Pt did not follow any commands, respond to any simple yes/no q's, or attempt to verbalize/vocalize despite max cues.        Assessment:  Zena Aguirre is a 81 y.o. female with a medical diagnosis of Embolic stroke involving left middle cerebral artery and presents with aphasia and dysphagia.     Discharge recommendations: Discharge Facility/Level Of Care Needs: nursing facility, skilled     Goals:   SLP Goals        Problem: SLP Goal    Goal Priority Disciplines Outcome   SLP Goal     SLP Ongoing (interventions implemented as appropriate)   Description:  Updated Speech Language Pathology Goals  Goals expected to be met by 3/22  1. Pt will participate in ongoing swallowing assessment to determine if safe to begin PO intake.  2. Pt will vocalize x1 during session.  3. Pt will follow simple, one step commands given max cues with 70% acc to improve overall receptive language  4. Pt will answer simple y/n questions in any modality with 70% acc to improve overall receptive language skills  5. Pt will identify objects in FO2 via eye gaze with 60% acc to improve overall  receptive language skills      Speech Language Pathology Goals  Goals expected to be met by 3/14:  1. Pt will participate in ongoing swallowing assessment to determine if safe to begin PO intake.  2. Pt will participate in speech/language evaluation to determine further goals.                     Plan:   Patient to be seen Therapy Frequency: 5 x/week   Plan of Care expires: 04/05/17  Plan of Care reviewed with: patient, spouse, family  SLP Follow-up?: Yes              SAMINA Guzman, CCC-SLP  03/16/2017     SAMINA Guzman, CCC-SLP  Speech Language Pathologist  [phone]  3/16/2017

## 2017-03-16 NOTE — PLAN OF CARE
MASHA spoke with Radha with [facility] ([phone]). They will follow Pt and will be able to accept her when ready. Report Pt received Pegg tube and may go to the floor over the weekend if stable. SW assigned will continue to keep them updated.     Sent 142 via [employer] to this facility and canceled the other referrals per family request at yesterday's meeting.    Gilma Mary LMSW  Neurocritical Care   [facility]  [id number]

## 2017-03-16 NOTE — PLAN OF CARE
Problem: Occupational Therapy Goal  Goal: Occupational Therapy Goal  Goals to be met by: 3/23/17     Patient will increase functional independence with ADLs by performing:    UE Dressing with Moderate Assistance.  LE Dressing with Maximum Assistance.  Grooming while seated with Moderate Assistance.  Toileting from bedside commode with Maximum Assistance for hygiene and clothing management.   Rolling to Bilateral with Moderate Assistance.   Supine to sit with Maximum Assistance.  Stand pivot transfers with Maximum Assistance.  Pt will follow 4/4 one step commands.   Outcome: Ongoing (interventions implemented as appropriate)  Goals updated.

## 2017-03-16 NOTE — PROGRESS NOTES
[facility]  General Surgery  Progress Note    Subjective:     Interval History:   Patient seen and examined, no acute events overnight, has been NPO in anticipation of PEG placement at the bedside today    Post-Op Info:  * No surgery found *          Medications:  Continuous Infusions:   Scheduled Meds:   albuterol-ipratropium 2.5mg-0.5mg/3mL  3 mL Nebulization Q8H    aspirin  81 mg Per NG tube Daily    atorvastatin  40 mg Per NG tube Daily    B-complex with vitamin C  1 tablet Per NG tube Daily    calcium carbonate  500 mg Per NG tube BID WM    ceFEPime (MAXIPIME) IVPB  1 g Intravenous Q8H    diltiaZEM  60 mg Per NG tube Q6H    heparin (porcine)  5,000 Units Subcutaneous Q8H    levothyroxine  25 mcg Per NG tube Before breakfast    lisinopril  20 mg Per NG tube Daily    senna-docusate 8.6-50 mg  1 tablet Per NG tube BID    sodium chloride 0.9%  10 mL Intravenous Q6H    sodium chloride 0.9%  3 mL Intravenous Q8H    sodium chloride 3%  250 mL Intravenous Once    vancomycin (VANCOCIN) IVPB  1,250 mg Intravenous Q12H    vitamin D  1,000 Units Per NG tube Daily     PRN Meds:acetaminophen, dextrose 50%, glucagon (human recombinant), hydrALAZINE, flu vacc [id number] 65yr up(PF), insulin aspart, magnesium sulfate IVPB, magnesium sulfate IVPB, pneumoc 13-evette conj-dip cr(PF), potassium chloride **AND** potassium chloride **AND** potassium chloride, Flushing PICC Protocol **AND** sodium chloride 0.9% **AND** sodium chloride 0.9%, sodium phosphate IVPB, sodium phosphate IVPB, sodium phosphate IVPB     Objective:     Vital Signs (Most Recent):  Temp: 97.9 °F (36.6 °C) (03/16/17 0305)  Pulse: 83 (03/16/17 0630)  Resp: 20 (03/16/17 0600)  BP: (!) 157/83 (03/16/17 0600)  SpO2: 97 % (03/16/17 0600) Vital Signs (24h Range):  Temp:  [97.9 °F (36.6 °C)-98.6 °F (37 °C)] 97.9 °F (36.6 °C)  Pulse:  [] 83  Resp:  [11-37] 20  SpO2:  [95 %-99 %] 97 %  BP: (113-176)/() 157/83       Intake/Output  Summary (Last 24 hours) at 03/16/17 0726  Last data filed at 03/16/17 0630   Gross per 24 hour   Intake             1790 ml   Output             1230 ml   Net              560 ml       Physical Exam   Constitutional: She appears well-developed and well-nourished. No distress.   HENT:   Head: Normocephalic and atraumatic.   Cardiovascular: Normal rate and regular rhythm.    Pulmonary/Chest: Effort normal. No respiratory distress.   Abdominal:   Soft, obese, non tender, mild distention, possible hernia vs diastasis in upper epigastric area, no previous surgical scars noted   Neurological: She is alert.       Significant Labs:  ABGs:   Recent Labs  Lab 03/15/17  1103   PH 7.481*   PCO2 26.3*   PO2 80   HCO3 19.6*   POCSATURATED 97   BE -4     BMP:   Recent Labs  Lab 03/16/17 0210 03/16/17  0601   *  --    * 147*   K 3.8  --    *  --    CO2 19*  --    BUN 31*  --    CREATININE 0.7  --    CALCIUM 8.3*  --    MG 1.9  --      CBC:   Recent Labs  Lab 03/16/17 0210   WBC 14.07*   RBC 2.81*   HGB 9.2*   HCT 29.1*      *   MCH 32.7*   MCHC 31.6*     CMP:   Recent Labs  Lab 03/16/17 0210 03/16/17  0601   *  --    CALCIUM 8.3*  --    ALBUMIN 2.1*  --    PROT 5.8*  --    * 147*   K 3.8  --    CO2 19*  --    *  --    BUN 31*  --    CREATININE 0.7  --    ALKPHOS 51*  --    ALT 34  --    AST 41*  --    BILITOT 0.4  --        Recent Labs  Lab 03/16/17 0210   ALT 34   AST 41*   ALKPHOS 51*   BILITOT 0.4   PROT 5.8*   ALBUMIN 2.1*     All pertinent labs from the last 24 hours have been reviewed.    Assessment/Plan:   80 yo female s/p embolic stroke of L MCA w persistent dysphagia  -plan for PEG placement at the bedside today  -has been NPO p MN  -consents obtained and in chart      Dana Sal PA-C   [id number]  General Surgery  [facility]

## 2017-03-16 NOTE — PROGRESS NOTES
Notified MD Ivan that pt's HR was increasing to 120's-140's, but not sustaining. Also informed him that pt had just been given a dose of cartizem. Stated to continue to monitor HR for now. No new orders. Will continue to monitor.

## 2017-03-16 NOTE — PROGRESS NOTES
[facility]  Adult Nutrition  Consult Note    SUMMARY     Recommendations    Recommendation/Intervention:   As medically able, s/p PEG, restart TF. Diabetisource @ goal rate 50mL/hr.   -Hold for residuals >500ml.     RD to monitor.    Goals: Some sort of nutrition within 48 hours  Nutrition Goal Status: goal met  Communication of RD Recs: reviewed with RN    Continuum of Care Plan     Discharge planning: optimal nutrition via PEG with tolerance    Reason for Assessment    Reason for Assessment: RD follow-up  Diagnosis: stroke/CVA  Relevent Medical History: HTN, HLD   Interdisciplinary Rounds: did not attend     General Information Comments: NPO with no TF running. PEG at bedside today scheduled.    Nutrition Prescription Ordered    Current Diet Order: NPO     Current Nutrition Support Formula Ordered: Other (Comment) (Diabetisource)  Current Nutrition Support Rate Ordered: 0 (ml)  Current Nutrition Support Frequency Ordered: mL/hr  Oral Nutrition Supplement: no goal rate ordered     Evaluation of Received Nutrients/Fluid Intake    Enteral Calories (kcal): 0  Enteral Protein (gm): 0  Enteral (Free Water) Fluid (mL): 0      Energy Calories Required: not meeting needs   Protein Required: not meeting needs        IV Fluid (mL): 0         Fluid Required: not meeting needs     Tolerance: other (see comments)     Nutrition Risk Screen     Nutrition Risk Screen: tube feeding or parenteral nutrition    Nutrition/Diet History    Patient Reported Diet/Restrictions/Preferences: other (see comments) (JUAN CARLOS)              Factors Affecting Nutritional Intake: NPO, difficulty/impaired swallowing                Labs/Tests/Procedures/Meds       Pertinent Labs Reviewed: reviewed, pertinent  Pertinent Labs Comments: Na 147, BUN 31, POCT Glu   Pertinent Medications Reviewed: reviewed, pertinent  Pertinent Medications Comments: asa, statin, heparin, B complex, insulin, Vitamin D    Physical Findings    Overall  Physical Appearance: underweight  Tubes: gastrostomy tube (to be placed today)  Oral/Mouth Cavity: WDL  Skin: intact    Anthropometrics       Height (inches): 62.01 in  Weight Method: Bed Scale  Weight (kg): 60 kg     Ideal Body Weight (IBW), Female: 110.05 lb     % Ideal Body Weight, Female (lb): 120.2 lb  BMI (kg/m2): 24.19  BMI Grade: 18.5-24.9 - normal     Estimated/Assessed Needs    Weight Used For Calorie Calculations: 60 kg (132 lb 4.4 oz)   Height (cm): 157.5 cm     Energy Need Method: Harding-St Jeor, other (see comments) (5486-3973 kcal/d)     RMR (Harding-St. Jeor Equation): 1024.26        Weight Used For Protein Calculations: 60 kg (132 lb 4.4 oz)  Protein Requirements: 66-78 g/d    Fluid Need Method: RDA Method, other (see comments) (1 mL/kcal or per MD)  CHO requirements: 50% of total kcals            Monitor and Evaluation    Food and Nutrient Intake: energy intake, food and beverage intake, enteral nutrition intake  Food and Nutrient Adminstration: diet order, enteral and parenteral nutrition administration     Physical Activity and Function: nutrition-related ADLs and IADLs  Anthropometric Measurements: weight, weight change, body mass index  Biochemical Data, Medical Tests and Procedures: electrolyte and renal panel, gastrointestinal profile, glucose/endocrine profile, inflammatory profile, lipid profile  Nutrition-Focused Physical Findings: overall appearance    Nutrition Risk    Level of Risk: moderate    Nutrition Follow-Up    RD Follow-up?: Yes      Assessment and Plan     Inadequate energy intake r/t inability to consume sufficient energy AEB NPO with no alternate means of nutrition.  Status: Continues

## 2017-03-16 NOTE — PLAN OF CARE
03/16/17 1232   Right Care Assessment   Can the patient answer the patient profile reliably? No, cognitively impaired   How often would a person be available to care for the patient? Whenever needed   Describe the patient's ability to walk at the present time. Does not walk or unable to take any steps at all   How does the patient rate their overall health at the present time? (anni)   Number of comorbid conditions (as recorded on the chart) Two   During the past month, has the patient often been bothered by feeling down, depressed or hopeless? (anni)   During the past month, has the patient often been bothered by little interest or pleasure in doing things? (anni)       Plan A:  [address]   [facility] [phone]  Fax [phone]  Cell [phone]    Plan B:  [facility]    Hortencia Gates RN, CCRN-K, [facility]  Neuro-Critical Care   [phone]

## 2017-03-16 NOTE — OP NOTE
DATE OF PROCEDURE: 03/16/2017    PREOPERATIVE DIAGNOSES:   1. Embolic stroke   2. Dysphagia.    POSTOPERATIVE DIAGNOSES:   1. Embolic stroke  2. Dysphagia.     PROCEDURES PERFORMED:   1. Esophagogastroduodenoscopy  2. Percutaneous endoscopic gastrostomy.    ATTENDING SURGEON: Hood Muhammad M.D.     HOUSESTAFF SURGEON: Nnamdi Coronel MD    ANESTHESIA: Local plus monitored anesthesia care    ESTIMATED BLOOD LOSS: 5 mL     FINDINGS: 20-Moldovan percutaneous gastrostomy @ 4cm placed without apparent complication.     SPECIMEN: None.     DRAINS: None.     COMPLICATIONS: None.     INDICATIONS: Zena Aguirre is a 81 y.o.female referred to [facility] for embolic stroke. The patient is expected to have prolonged dysphagia as a result and General Surgery was consulted for placement of a percutaneous gastrostomy tube. We did obtain informed consent from the patient's family who expressed understanding of the risks and benefits and gave consent to proceed.     OPERATIVE PROCEDURE:  A timeout procedure was performed and all team members present agreed this was the correct procedure on the correct patient. Light sedation was given in the patient's room with hemodynamic monitoring ongoing continuously.    We then directed our attention to the left upper quadrant for gastrostomy tube placement. The patient's abdomen was prepped and draped. An upper endoscope was introduced into the oropharynx and guided down into the esophagus and stomach. The stomach was insufflated with air. We intubated the duodenum and examined the first and second portions; no abnormalities were noted. We withdrew the endoscope into the stomach and identified an appropriate position for gastrostomy tube placement 2 finger-breadths below the left subcostal margin. Palpation of the anterior abdominal wall at this point was visualized endoscopically and transillumination from the endoscope was visualized through the anterior abdominal wall. We made a  1.5 cm transverse skin incision. At this point, the stomach was cannulated with a catheter loaded on a needle. This was grasped by a snare which had been passed through the endoscope. The needle was removed, and a guidewire was placed, and the snare was used to grasp the guidewire. The endoscope, snare, and guidewire were all withdrawn from the patient's mouth. A 20-Macanese gastrostomy tube was loaded onto the guidewire and pulled through the anterior abdominal wall via Seldinger technique. Repeat endoscopy was performed with the gastrostomy tube at the 4 cm verónica at the skin. There was no blanching of the gastric mucosa, and when the tube was twisted, the button did not grab the mucosa. The insufflation in the stomach was evacuated, and the endoscope was removed. The gastrostomy tube was secured in place using the supplied devices and connected to a bag for gravity drainage.    The patient tolerated the procedure without issue. I was present for and directed or performed the entire procedure. All sponge, instrument, and needle counts were correct at the termination of the PEG procedure.

## 2017-03-17 LAB
ALBUMIN SERPL BCP-MCNC: 2 G/DL
ALP SERPL-CCNC: 45 U/L
ALT SERPL W/O P-5'-P-CCNC: 32 U/L
ANION GAP SERPL CALC-SCNC: 6 MMOL/L
AST SERPL-CCNC: 46 U/L
BASOPHILS # BLD AUTO: 0.02 K/UL
BASOPHILS NFR BLD: 0.2 %
BILIRUB SERPL-MCNC: 0.5 MG/DL
BUN SERPL-MCNC: 27 MG/DL
CALCIUM SERPL-MCNC: 8 MG/DL
CHLORIDE SERPL-SCNC: 116 MMOL/L
CO2 SERPL-SCNC: 22 MMOL/L
CREAT SERPL-MCNC: 0.6 MG/DL
DIFFERENTIAL METHOD: ABNORMAL
EOSINOPHIL # BLD AUTO: 0.1 K/UL
EOSINOPHIL NFR BLD: 1.2 %
ERYTHROCYTE [DISTWIDTH] IN BLOOD BY AUTOMATED COUNT: 14 %
EST. GFR  (AFRICAN AMERICAN): >60 ML/MIN/1.73 M^2
EST. GFR  (NON AFRICAN AMERICAN): >60 ML/MIN/1.73 M^2
GLUCOSE SERPL-MCNC: 91 MG/DL
HCT VFR BLD AUTO: 28.1 %
HGB BLD-MCNC: 8.9 G/DL
LYMPHOCYTES # BLD AUTO: 2.1 K/UL
LYMPHOCYTES NFR BLD: 18.4 %
MAGNESIUM SERPL-MCNC: 1.3 MG/DL
MAGNESIUM SERPL-MCNC: 2.4 MG/DL
MCH RBC QN AUTO: 32.5 PG
MCHC RBC AUTO-ENTMCNC: 31.7 %
MCV RBC AUTO: 103 FL
MONOCYTES # BLD AUTO: 1.3 K/UL
MONOCYTES NFR BLD: 11.9 %
NEUTROPHILS # BLD AUTO: 7.6 K/UL
NEUTROPHILS NFR BLD: 67.8 %
PHOSPHATE SERPL-MCNC: 3.2 MG/DL
PLATELET # BLD AUTO: 209 K/UL
PMV BLD AUTO: 10.1 FL
POCT GLUCOSE: 100 MG/DL (ref 70–110)
POCT GLUCOSE: 105 MG/DL (ref 70–110)
POCT GLUCOSE: 108 MG/DL (ref 70–110)
POCT GLUCOSE: 116 MG/DL (ref 70–110)
POCT GLUCOSE: 133 MG/DL (ref 70–110)
POTASSIUM SERPL-SCNC: 3.6 MMOL/L
POTASSIUM SERPL-SCNC: 4.1 MMOL/L
PROT SERPL-MCNC: 5.5 G/DL
RBC # BLD AUTO: 2.74 M/UL
SODIUM SERPL-SCNC: 138 MMOL/L
SODIUM SERPL-SCNC: 141 MMOL/L
SODIUM SERPL-SCNC: 142 MMOL/L
SODIUM SERPL-SCNC: 144 MMOL/L
WBC # BLD AUTO: 11.24 K/UL

## 2017-03-17 PROCEDURE — 94668 MNPJ CHEST WALL SBSQ: CPT

## 2017-03-17 PROCEDURE — 25000003 PHARM REV CODE 250: Performed by: STUDENT IN AN ORGANIZED HEALTH CARE EDUCATION/TRAINING PROGRAM

## 2017-03-17 PROCEDURE — 85025 COMPLETE CBC W/AUTO DIFF WBC: CPT

## 2017-03-17 PROCEDURE — 84295 ASSAY OF SERUM SODIUM: CPT | Mod: 91

## 2017-03-17 PROCEDURE — 97110 THERAPEUTIC EXERCISES: CPT

## 2017-03-17 PROCEDURE — 83735 ASSAY OF MAGNESIUM: CPT

## 2017-03-17 PROCEDURE — 99233 SBSQ HOSP IP/OBS HIGH 50: CPT | Mod: ,,, | Performed by: PSYCHIATRY & NEUROLOGY

## 2017-03-17 PROCEDURE — 99900035 HC TECH TIME PER 15 MIN (STAT)

## 2017-03-17 PROCEDURE — 25000003 PHARM REV CODE 250: Performed by: PHYSICIAN ASSISTANT

## 2017-03-17 PROCEDURE — 94761 N-INVAS EAR/PLS OXIMETRY MLT: CPT

## 2017-03-17 PROCEDURE — 63600175 PHARM REV CODE 636 W HCPCS: Performed by: PHYSICIAN ASSISTANT

## 2017-03-17 PROCEDURE — 84100 ASSAY OF PHOSPHORUS: CPT

## 2017-03-17 PROCEDURE — 25000003 PHARM REV CODE 250: Performed by: PSYCHIATRY & NEUROLOGY

## 2017-03-17 PROCEDURE — 83735 ASSAY OF MAGNESIUM: CPT | Mod: 91

## 2017-03-17 PROCEDURE — 20000000 HC ICU ROOM

## 2017-03-17 PROCEDURE — 80053 COMPREHEN METABOLIC PANEL: CPT

## 2017-03-17 PROCEDURE — 84132 ASSAY OF SERUM POTASSIUM: CPT

## 2017-03-17 PROCEDURE — 94640 AIRWAY INHALATION TREATMENT: CPT

## 2017-03-17 PROCEDURE — 25000242 PHARM REV CODE 250 ALT 637 W/ HCPCS: Performed by: PHYSICIAN ASSISTANT

## 2017-03-17 RX ADMIN — Medication 3 ML: at 05:03

## 2017-03-17 RX ADMIN — DILTIAZEM HYDROCHLORIDE 60 MG: 60 TABLET, FILM COATED ORAL at 05:03

## 2017-03-17 RX ADMIN — DILTIAZEM HYDROCHLORIDE 60 MG: 60 TABLET, FILM COATED ORAL at 11:03

## 2017-03-17 RX ADMIN — DILTIAZEM HYDROCHLORIDE 60 MG: 60 TABLET, FILM COATED ORAL at 06:03

## 2017-03-17 RX ADMIN — HEPARIN SODIUM 5000 UNITS: 5000 INJECTION, SOLUTION INTRAVENOUS; SUBCUTANEOUS at 02:03

## 2017-03-17 RX ADMIN — CALCIUM 500 MG: 500 TABLET ORAL at 08:03

## 2017-03-17 RX ADMIN — MAGNESIUM SULFATE IN WATER 4 G: 40 INJECTION, SOLUTION INTRAVENOUS at 02:03

## 2017-03-17 RX ADMIN — ASPIRIN 81 MG CHEWABLE TABLET 81 MG: 81 TABLET CHEWABLE at 08:03

## 2017-03-17 RX ADMIN — CEFEPIME HYDROCHLORIDE 1 G: 1 INJECTION, POWDER, FOR SOLUTION INTRAMUSCULAR; INTRAVENOUS at 06:03

## 2017-03-17 RX ADMIN — STANDARDIZED SENNA CONCENTRATE AND DOCUSATE SODIUM 1 TABLET: 8.6; 5 TABLET, FILM COATED ORAL at 08:03

## 2017-03-17 RX ADMIN — Medication 10 ML: at 05:03

## 2017-03-17 RX ADMIN — VANCOMYCIN HYDROCHLORIDE 1250 MG: 1 INJECTION, POWDER, LYOPHILIZED, FOR SOLUTION INTRAVENOUS at 02:03

## 2017-03-17 RX ADMIN — IPRATROPIUM BROMIDE AND ALBUTEROL SULFATE 3 ML: .5; 3 SOLUTION RESPIRATORY (INHALATION) at 07:03

## 2017-03-17 RX ADMIN — HEPARIN SODIUM 5000 UNITS: 5000 INJECTION, SOLUTION INTRAVENOUS; SUBCUTANEOUS at 05:03

## 2017-03-17 RX ADMIN — POTASSIUM CHLORIDE 40 MEQ: 400 INJECTION, SOLUTION INTRAVENOUS at 04:03

## 2017-03-17 RX ADMIN — VITAMIN C 1 TABLET: TAB at 08:03

## 2017-03-17 RX ADMIN — Medication 3 ML: at 09:03

## 2017-03-17 RX ADMIN — VITAMIN D, TAB 1000IU (100/BT) 1000 UNITS: 25 TAB at 08:03

## 2017-03-17 RX ADMIN — HEPARIN SODIUM 5000 UNITS: 5000 INJECTION, SOLUTION INTRAVENOUS; SUBCUTANEOUS at 09:03

## 2017-03-17 RX ADMIN — ACETAMINOPHEN 650 MG: 325 TABLET ORAL at 09:03

## 2017-03-17 RX ADMIN — ATORVASTATIN CALCIUM 40 MG: 20 TABLET, FILM COATED ORAL at 08:03

## 2017-03-17 RX ADMIN — IPRATROPIUM BROMIDE AND ALBUTEROL SULFATE 3 ML: .5; 3 SOLUTION RESPIRATORY (INHALATION) at 12:03

## 2017-03-17 RX ADMIN — LISINOPRIL 20 MG: 20 TABLET ORAL at 08:03

## 2017-03-17 RX ADMIN — CALCIUM 500 MG: 500 TABLET ORAL at 04:03

## 2017-03-17 RX ADMIN — IPRATROPIUM BROMIDE AND ALBUTEROL SULFATE 3 ML: .5; 3 SOLUTION RESPIRATORY (INHALATION) at 03:03

## 2017-03-17 RX ADMIN — LEVOTHYROXINE SODIUM 25 MCG: 25 TABLET ORAL at 06:03

## 2017-03-17 NOTE — SUBJECTIVE & OBJECTIVE
Neurologic Chief Complaint: L MCA stroke     Subjective:     Interval History: Patient is seen for follow-up neurological assessment and treatment recommendations: NAEON, neurologically stable, following simple commands      HPI, Past Medical, Family, and Social History remains the same as documented in the initial encounter.     Review of Systems   Constitutional: Negative for fever.   HENT: Negative for drooling.    Eyes: Negative for redness.   Respiratory: Negative for cough and shortness of breath.    Cardiovascular: Negative for leg swelling.        (+) tachycardia   Gastrointestinal: Negative for vomiting.   Musculoskeletal: Negative for joint swelling.   Skin: Negative for rash.   Neurological: Positive for facial asymmetry, speech difficulty and weakness.   Psychiatric/Behavioral: Negative for agitation.     Scheduled Meds:   albuterol-ipratropium 2.5mg-0.5mg/3mL  3 mL Nebulization Q8H    aspirin  81 mg Per NG tube Daily    atorvastatin  40 mg Per NG tube Daily    B-complex with vitamin C  1 tablet Per NG tube Daily    calcium carbonate  500 mg Per NG tube BID WM    diltiaZEM  60 mg Per NG tube Q6H    heparin (porcine)  5,000 Units Subcutaneous Q8H    levothyroxine  25 mcg Per NG tube Before breakfast    lisinopril  20 mg Per NG tube Daily    metoprolol  5 mg Intravenous Once    senna-docusate 8.6-50 mg  1 tablet Per NG tube BID    sodium chloride 0.9%  10 mL Intravenous Q6H    sodium chloride 0.9%  3 mL Intravenous Q8H    sodium chloride 3%  250 mL Intravenous Once    vitamin D  1,000 Units Per NG tube Daily     Continuous Infusions:     PRN Meds:acetaminophen, dextrose 50%, glucagon (human recombinant), hydrALAZINE, flu vacc [id number] 65yr up(PF), insulin aspart, magnesium sulfate IVPB, magnesium sulfate IVPB, midazolam (PF), pneumoc 13-evette conj-dip cr(PF), potassium chloride **AND** potassium chloride **AND** potassium chloride, rocuronium, Flushing PICC Protocol **AND** sodium chloride  0.9% **AND** sodium chloride 0.9%, sodium phosphate IVPB, sodium phosphate IVPB, sodium phosphate IVPB    Objective:     Vital Signs (Most Recent):  Temp: 97.6 °F (36.4 °C) (17 1103)  Pulse: 71 (17 1300)  Resp: 19 (17 1300)  BP: 138/78 (17 1300)  SpO2: 98 % (17 1300)  BP Location: Right arm    Vital Signs Range (Last 24H):  Temp:  [97.5 °F (36.4 °C)-98.5 °F (36.9 °C)]   Pulse:  []   Resp:  [11-22]   BP: (107-163)/()   SpO2:  [96 %-100 %]   BP Location: Right arm    Physical Exam   Constitutional: She appears well-developed and well-nourished.   HENT:   Head: Normocephalic.   Eyes: Pupils are equal, round, and reactive to light.   Gaze left    Cardiovascular: Normal rate.    In and out of a fib     Pulmonary/Chest: Effort normal. No respiratory distress.   Abdominal: Soft. She exhibits no distension.   Neurological:   Drowsy   Skin: Skin is warm and dry.   Nursing note and vitals reviewed.      Neurological Exam:   LOC: drowsy and follows simple commands  Language: Expressive aphasia  Speech: Aphasic   Orientation: Aphasic  EOM (CN III, IV, VI): Gaze preference left  Facial Movement (CN VII): lower weakness right lower  Motor*: Arm Left:  Paretic:  4/5, Leg Left:   Paretic:  4/5, Arm Right:   Paretic:  2/5, Leg Right:   Paretic:  2/5  Sensation: intact to light touch, temperature and vibration    NIH Stroke Scale:    Level of Consciousness: 1 - drowsy  LOC Questions: 2 - answers none correctly  LOC Commands: 2 - performs neither correctly  Best Gaze: 1 - partial gaze palsy  Visual: 2 - complete hemianopia  Facial Palsy: 1 - minor  Motor Left Arm: 0 - no drift  Motor Right Arm: 3 - no effort against gravity  Motor Left Le - no drift  Motor Right Leg: 3 - no effort against gravity  Limb Ataxia: 0 - absent  Sensory: 1 - mild to moderate loss  Best Language: 3 - mute  Dysarthria: 0 - normal articulation  Extinction and Inattention: 0 - no neglect  NIH Stroke Scale Total:  19      Laboratory:  CMP:     Recent Labs  Lab 03/17/17  0145  03/17/17  1152   CALCIUM 8.0*  --   --    ALBUMIN 2.0*  --   --    PROT 5.5*  --   --      < > 141   K 3.6  --  4.1   CO2 22*  --   --    *  --   --    BUN 27*  --   --    CREATININE 0.6  --   --    ALKPHOS 45*  --   --    ALT 32  --   --    AST 46*  --   --    BILITOT 0.5  --   --    < > = values in this interval not displayed.  CBC:     Recent Labs  Lab 03/17/17  0145   WBC 11.24   RBC 2.74*   HGB 8.9*   HCT 28.1*      *   MCH 32.5*   MCHC 31.7*     Lipid Panel:   No results for input(s): CHOL, LDLCALC, HDL, TRIG in the last 168 hours.  Coagulation:     Recent Labs  Lab 03/14/17  0232   INR 1.0     Platelet Aggregation Study: No results for input(s): PLTAGG, PLTAGINTERP, PLTAGREGLACO, ADPPLTAGGREG in the last 168 hours.  Hgb A1C:   No results for input(s): HGBA1C in the last 168 hours.  TSH:   No results for input(s): TSH in the last 168 hours.    Diagnostic Results:  I have personally reviewed  CT head. Date: 3/7/17  Temporal evolution of large left MCA territory  acute infarction with mass effect on the underlying parenchyma, sulci and left lateral ventricle with a 0.3 cm rightward midline shift.  No evidence of hydrocephalus or intracranial hemorrhage in this patient status post tPA.  Continued followup is recommended.     CTA stroke multiphase. Date: 3/7/17  -L M1 stenosis  -R vert narrowing    Echo. Date: 3/7/17  Moderately enlarged LA  CONCLUSIONS     1 - Concentric remodeling.     2 - Normal left ventricular systolic function (EF 60-65%).     3 - Right ventricular enlargement with moderately depressed systolic function.     4 - Left ventricular diastolic dysfunction.     5 - Biatrial enlargement.     6 - Mild aortic stenosis, MATEO = 1.6 cm2, peak velocity = 2.0 m/s, mean gradient = 10 mmHg.     7 - Severe tricuspid regurgitation.     8 - Pulmonary hypertension. The estimated PA systolic pressure is 102 mmHg.     9 -  Increased central venous pressure.

## 2017-03-17 NOTE — PLAN OF CARE
Problem: Patient Care Overview  Goal: Plan of Care Review  Outcome: Ongoing (interventions implemented as appropriate)  POC reviewed with Zena Aguirre and Zena Timothy's family at bedside. Pt globally aphasic and unable to verbalize or demonstrate understanding. Pt's family verbalized understanding. Questions and concerns addressed. No acute events overnight. See flowsheets for assessments and VS. Pt progressing towards goals. Will continue to monitor.

## 2017-03-17 NOTE — ASSESSMENT & PLAN NOTE
Likely cardioembolic in etiology, given recent Dx of afib not on anticoagulation.  Patient now dnr /dni     Antithrombotics for secondary stroke prevention: asa 81    Statins for secondary stroke prevention and hyperlipidemia, if present: Atorvastatin- 40 mg oral daily.     Aggressive risk factor modification: Hypertension, High Cholesterol, Diet, Exercise and Obesity, Rehab Efforts: Physical Therapy, Occupational Therapy, Speech and Language Pathology and Physical Medicine and Rehabilitation    Diagnostics: Ordered/Pending  MRI head without contrast to assess brain parenchyma    VTE Prophylaxis: heparin sq    BP Parameters: SBP <180    Nursing Orders: Neuro checks- q1h, Antiembolic stockings, Swallowing evaluation by Nursing, Out of bed BID, Avoid Mace catheter, Pneumatic compression device, Stroke Education, Blood glucose target 100-130, Up with assistance and IV Fluids: Per primary team

## 2017-03-17 NOTE — PLAN OF CARE
Problem: Occupational Therapy Goal  Goal: Occupational Therapy Goal  Goals to be met by: 3/23/17     Patient will increase functional independence with ADLs by performing:    UE Dressing with Moderate Assistance.  LE Dressing with Maximum Assistance.  Grooming while seated with Moderate Assistance.  Toileting from bedside commode with Maximum Assistance for hygiene and clothing management.   Rolling to Bilateral with Moderate Assistance.   Supine to sit with Maximum Assistance.  Stand pivot transfers with Maximum Assistance.  Pt will follow 4/4 one step commands.   Goals remain appropriate.  VERONA Osorio  3/17/2017

## 2017-03-17 NOTE — PROGRESS NOTES
ICU Progress Note  Neurocritical Care    Admit Date: 3/7/2017  LOS: 10    CC: Embolic stroke involving left middle cerebral artery    SUBJECTIVE:     Interval History/Significant Events:   Had a PEG placed yesterday     Medications:  Continuous Infusions:     Scheduled Meds:   albuterol-ipratropium 2.5mg-0.5mg/3mL  3 mL Nebulization Q8H    aspirin  81 mg Per NG tube Daily    atorvastatin  40 mg Per NG tube Daily    B-complex with vitamin C  1 tablet Per NG tube Daily    calcium carbonate  500 mg Per NG tube BID WM    diltiaZEM  60 mg Per NG tube Q6H    heparin (porcine)  5,000 Units Subcutaneous Q8H    levothyroxine  25 mcg Per NG tube Before breakfast    lisinopril  20 mg Per NG tube Daily    metoprolol  5 mg Intravenous Once    senna-docusate 8.6-50 mg  1 tablet Per NG tube BID    sodium chloride 0.9%  10 mL Intravenous Q6H    sodium chloride 0.9%  3 mL Intravenous Q8H    sodium chloride 3%  250 mL Intravenous Once    vitamin D  1,000 Units Per NG tube Daily     PRN Meds:.acetaminophen, dextrose 50%, glucagon (human recombinant), hydrALAZINE, flu vacc [id number] 65yr up(PF), insulin aspart, magnesium sulfate IVPB, magnesium sulfate IVPB, midazolam (PF), pneumoc 13-evette conj-dip cr(PF), potassium chloride **AND** potassium chloride **AND** potassium chloride, rocuronium, Flushing PICC Protocol **AND** sodium chloride 0.9% **AND** sodium chloride 0.9%, sodium phosphate IVPB, sodium phosphate IVPB, sodium phosphate IVPB    OBJECTIVE:     I & O (24h):    Intake/Output Summary (Last 24 hours) at 03/17/17 1245  Last data filed at 03/17/17 1000   Gross per 24 hour   Intake             1950 ml   Output             1435 ml   Net              515 ml       Physical Exam:  Last Vitals:  Vitals:    03/17/17 1000   BP: 128/75   Pulse: (!) 59   Resp: 13   Temp:      Vital Signs (24h Range):   Temp:  [97.5 °F (36.4 °C)-98.5 °F (36.9 °C)] 97.5 °F (36.4 °C)  Pulse:  [] 59  Resp:  [10-26] 13  SpO2:  [96 %-100 %]  97 %  BP: (107-163)/() 128/75  GA: Alert, no acute distress.   HEENT: No scleral icterus or JVD.   Pulmonary: Clear to auscultation A/P/L. No wheezing, crackles, or rhonchi.  Cardiac: RRR S1 & S2 w/o rubs/murmurs/gallops.   Abdominal: Bowel sounds present x 4. No appreciable hepatosplenomegaly.  Skin: multiple areas of achymosis   Neuro:  --GCS: E4 V1 M6  --Mental Status:  Expressive aphasia.  Follows simple commands intermittently.   --CN II-XII grossly intact.   --Pupils 3mm, PERRL.   --Corneal reflex, gag, cough intact.  --moves left extremities spontaneously  --withdraws to pain in right extremities     Invasive Lines:  --Central Line:        PICC Double Lumen 03/08/17 1208 left brachial (Active)   Site Assessment Clean;Dry;Intact;No redness;No swelling 3/13/2017  7:01 AM   Lumen 1 Status Flushed;Blood return noted;Infusing 3/13/2017  7:01 AM   Lumen 2 Status Infusing 3/13/2017  7:01 AM   Length verónica (cm) 36 cm 3/8/2017 12:05 PM   Current Exposed Catheter (cm) 0 cm 3/8/2017 12:05 PM   Extremity Circumference (cm) 25 cm 3/8/2017 12:05 PM   Dressing Type Transparent 3/13/2017  7:01 AM   Dressing Status Biopatch in place;Clean;Dry;Intact 3/13/2017  7:01 AM   Dressing Intervention Dressing reinforced 3/13/2017  7:01 AM   Dressing Change Due 03/15/17 3/13/2017  7:01 AM   Daily Line Review Performed 3/13/2017  7:01 AM       --Arterial Line:      --Surgical Drains/Mace:        NG/OG Tube 03/08/17 0615 [place] sump 14 Fr. Right nostril (Active)   Placement Check placement verified by distal tube length measurement;placement verified by x-ray 3/13/2017  7:01 AM   Distal Tube Length (cm) 63 3/10/2017  3:05 AM   Tolerance no signs/symptoms of discomfort 3/13/2017  7:01 AM   Securement anchored to nostril center w/ adhesive device 3/13/2017  7:01 AM   Clamp Status/Tolerance unclamped;no abdominal discomfort;no abdominal distention;no emesis;no nausea;no residual;no restlessness 3/13/2017  7:01 AM   Insertion Site  "Appearance no redness, warmth, tenderness, skin breakdown, drainage 3/13/2017  7:01 AM   Flush/Irrigation flushed w/;water;no resistance met 3/13/2017  7:01 AM   Feeding Method continuous 3/13/2017  7:01 AM   Current Rate (mL/hr) 40 mL/hr 3/13/2017  7:01 AM   Goal Rate (mL/hr) 40 mL/hr 3/13/2017  7:01 AM   Intake (mL) 150 mL 3/13/2017  8:00 AM   Intake (mL) - Formula Tube Feeding 40 3/13/2017 12:00 PM   Residual Amount (ml) 10 ml 3/12/2017  7:01 AM            Urethral Catheter 03/12/17 1300 (Active)   Site Assessment Clean;Intact 3/13/2017  7:01 AM   Collection Container Urimeter 3/13/2017  7:01 AM   Securement Method secured to top of thigh w/ adhesive device 3/13/2017  7:01 AM   Catheter Care Performed yes 3/13/2017  7:01 AM   Reason for Continuing Urinary Catheterization Critically ill in ICU requiring intensive monitoring 3/13/2017  7:01 AM   CAUTI Prevention Bundle StatLock in place w 1" slack;Intact seal between catheter & drainage tubing;Drainage bag off the floor;Green sheeting clip in use;No dependent loops or kinks;Drainage bag not overfilled (<2/3 full);Drainage bag below bladder 3/13/2017  7:01 AM   Output (mL) 450 mL 3/13/2017 12:00 PM       Nutrition: TF held for PEG placement     Labs:  ABG:   No results for input(s): PH, PO2, PCO2, HCO3, POCSATURATED, BE in the last 24 hours.    BMP:    Recent Labs  Lab 03/17/17  0145  03/17/17  1152     < > 141   K 3.6  --  4.1   *  --   --    CO2 22*  --   --    BUN 27*  --   --    CREATININE 0.6  --   --    GLU 91  --   --    MG 1.3*  --  2.4   PHOS 3.2  --   --    < > = values in this interval not displayed.    LFT:   Lab Results   Component Value Date    AST 46 (H) 03/17/2017    ALT 32 03/17/2017    ALKPHOS 45 (L) 03/17/2017    BILITOT 0.5 03/17/2017    ALBUMIN 2.0 (L) 03/17/2017    PROT 5.5 (L) 03/17/2017       CBC:   Lab Results   Component Value Date    WBC 11.24 03/17/2017    HGB 8.9 (L) 03/17/2017    HCT 28.1 (L) 03/17/2017     (H) " 03/17/2017     03/17/2017       Microbiology x 7d:   Microbiology Results (last 7 days)     Procedure Component Value Units Date/Time    Culture, Respiratory [[id number]] Collected:  03/13/17 1608    Order Status:  Completed Specimen:  Respiratory from Sputum Updated:  03/16/17 0950     Respiratory Culture Normal respiratory richard     Gram Stain (Respiratory) <10 epithelial cells per low power field.     Gram Stain (Respiratory) Rare WBC's     Gram Stain (Respiratory) Few Gram positive cocci     Gram Stain (Respiratory) Rare budding yeast     Gram Stain (Respiratory) Rare Gram negative rods          Imaging:  I have personally reviewed.     ASSESSMENT/PLAN:     1. Malignant LMCA ischemic stroke  2. Cerebral edema  3. Subfalcine herniation  4. Aphasia   5. AF  6. Aspiration pneumonia   7. Constipation     A/P:  - Neurological exam is unchanged   - Trend NA to goal of normal Na  - Continue asa  - Rate is under better control today, continue current regimen   - Continue lisinopril   - finished course of abx.  - S/p PEG  - Start TF  - DC IVF   - Bowel regimen     Care level 3.

## 2017-03-17 NOTE — ASSESSMENT & PLAN NOTE
Stroke risk factor  Goal LDL <70  LDL 68 with patient on simvastatin at home  Switch to atorvastatin 40mg daily

## 2017-03-17 NOTE — PT/OT/SLP PROGRESS
"Occupational Therapy  Treatment    Zena Aguirre   MRN: [id number]   Admitting Diagnosis: Embolic stroke involving left middle cerebral artery    OT Date of Treatment: 17   OT Start Time: 450  OT Stop Time: 514  OT Total Time (min): 24 min    Billable Minutes:  Therapeutic Exercise 24    General Precautions: Standard, aspiration, aphasia, fall, NPO  Orthopedic Precautions: N/A  Braces: N/A    Do you have any cultural, spiritual, Religion conflicts, given your current situation?: [place]    Subjective:  Communicated with nurse prior to session.  Patient:  Nonverbal  Nurse:  "Her heart rate was a lot better overnight."   Pain Ratin/10   Pain Rating Post-Intervention: 0/10    Objective:  Patient found with: arterial line, blood pressure cuff, telemetry, SCD, peripheral IV, pressure relief boots, pulse ox (continuous), PICC line, sesay catheter   Son present.    Functional Mobility:  Patient limited to in bed activity.    Activities of Daily Living:  Feeding Level of Assistance:  (NPO)  Grooming Position:  (supine)  Grooming Level of Assistance: Total assistance      Additional Treatment:   Patient education provided on role of OT, daily orientation, ROM and positioning.  Daily orientation provided.  PROM performed left UE/LE and AAROM right UE/LEs one set x 10 rep in all planes of motion with stretches provided at end range; sustained stretch provided for right UE external rotation, wrist extension and ankle dorsiflexion.  Assistance and facilitation provided for upward rotation of the scapula during shoulder flexion and abduction.  Patient alert throughout the session; not following commands.  Minimal edema noted right hand; positioning provided in elevation.  Positioning provided for midline orientation with bilateral UEs elevated and heels lifted off mattress.  Gentle cervical rotation provided.   Son present during the session.   Patient verbalizing understanding via teach back method. Continued " education, patient/ family training recommended.  Patient's functional status and disposition recommendation discussed with stroke team in daily rounds.  White board updated in patient's room.  OT asked if there were any other questions; patient/ family had no further questions.      AM-PAC 6 CLICK ADL   How much help from another person does this patient currently need?   1 = Unable, Total/Dependent Assistance  2 = A lot, Maximum/Moderate Assistance  3 = A little, Minimum/Contact Guard/Supervision  4 = None, Modified [place]/Independent    Putting on and taking off regular lower body clothing? : 1  Bathing (including washing, rinsing, drying)?: 1  Toileting, which includes using toilet, bedpan, or urinal? : 1  Putting on and taking off regular upper body clothing?: 1  Taking care of personal grooming such as brushing teeth?: 1  Eating meals?: 1  Total Score: 6     AM-PAC Raw Score CMS G-Code Modifier Level of Impairment Assistance   6 % Total / Unable   7 - 9 CM 80 - 100% Maximal Assist   10 - 14 CL 60 - 80% Moderate Assist   15 - 19 CK 40 - 60% Moderate Assist   20 - 22 CJ 20 - 40% Minimal Assist   23 CI 1-20% SBA / CGA   24 CH 0% Independent/ Mod I     Patient left supine with all lines intact and call button in reach    ASSESSMENT:  Zena Aguirre is a 81 y.o. female with a medical diagnosis of Embolic stroke involving left middle cerebral artery and presents with performance deficits of physical skills including impaired  balance, mobility, strength, dexterity, fine motor coordination, gross motor coordination, sensation and endurance; demonstrating performance deficits of cognitive skills including impaired attention, perception, understanding, problem solving, sequencing, communication (aphasia) and memory all resulting in inability organizing occupational performance in a timely and safe manner; demonstrating performance deficits of psychosocial skills including impairments of interpersonal  interactions and coping strategies which are skills necessary to successfully and appropriately participate in everyday tasks and social situations.  These performance deficits have resulted in activity limitations including but not limited to:   bed mobility, transfers, ascending/ descending stairs, walking short and long distances, walking around obstacles, transitional movement patterns (kneeling, bending); eating, upper body dressing, lower body dressing, brushing teeth, flossing, washing hair, toileting, bathing, carrying objects, driving, shopping, meal preparation, and sewing.   Patient's role as wife, mother, grandmother and independent caretaker for self has been affected. Patient will benefit from skilled OT services to maximize level of independence with self-care skills and functional mobility.  Will benefit from SNF.    Rehab identified problem list/impairments: Rehab identified problem list/impairments: weakness, impaired sensation, impaired functional mobilty, impaired balance, impaired cognition, decreased upper extremity function, decreased safety awareness, impaired endurance, impaired self care skills, gait instability, visual deficits, decreased coordination, decreased lower extremity function, decreased ROM, impaired fine motor, impaired coordination    Rehab potential is good.    Activity tolerance: Good    Discharge recommendations: Discharge Facility/Level Of Care Needs: nursing facility, skilled     Barriers to discharge: Barriers to Discharge: Inaccessible home environment, Decreased caregiver support    Equipment recommendations: 3-in-1 commode, bath bench, wheelchair     GOALS:   Occupational Therapy Goals        Problem: Occupational Therapy Goal    Goal Priority Disciplines Outcome Interventions   Occupational Therapy Goal     OT, PT/OT Ongoing (interventions implemented as appropriate)    Description:  Goals to be met by: 3/23/17     Patient will increase functional independence with  ADLs by performing:    UE Dressing with Moderate Assistance.  LE Dressing with Maximum Assistance.  Grooming while seated with Moderate Assistance.  Toileting from bedside commode with Maximum Assistance for hygiene and clothing management.   Rolling to Bilateral with Moderate Assistance.   Supine to sit with Maximum Assistance.  Stand pivot transfers with Maximum Assistance.  Pt will follow 4/4 one step commands.                 Plan:  Patient to be seen 5 x/week to address the above listed problems via self-care/home management, therapeutic exercises, therapeutic activities, neuromuscular re-education, cognitive retraining, sensory integration  Plan of Care expires: 04/07/17  Plan of Care reviewed with: patient, son         Ama VERONA Miner  03/17/2017

## 2017-03-17 NOTE — PROGRESS NOTES
[facility]  Vascular Neurology  Comprehensive Stroke Center  Progress Note      Neurologic Chief Complaint: L MCA stroke     Subjective:     Interval History: Patient is seen for follow-up neurological assessment and treatment recommendations: NAEON, neurologically stable, following simple commands      HPI, Past Medical, Family, and Social History remains the same as documented in the initial encounter.     Review of Systems   Constitutional: Negative for fever.   HENT: Negative for drooling.    Eyes: Negative for redness.   Respiratory: Negative for cough and shortness of breath.    Cardiovascular: Negative for leg swelling.        (+) tachycardia   Gastrointestinal: Negative for vomiting.   Musculoskeletal: Negative for joint swelling.   Skin: Negative for rash.   Neurological: Positive for facial asymmetry, speech difficulty and weakness.   Psychiatric/Behavioral: Negative for agitation.     Scheduled Meds:   albuterol-ipratropium 2.5mg-0.5mg/3mL  3 mL Nebulization Q8H    aspirin  81 mg Per NG tube Daily    atorvastatin  40 mg Per NG tube Daily    B-complex with vitamin C  1 tablet Per NG tube Daily    calcium carbonate  500 mg Per NG tube BID WM    diltiaZEM  60 mg Per NG tube Q6H    heparin (porcine)  5,000 Units Subcutaneous Q8H    levothyroxine  25 mcg Per NG tube Before breakfast    lisinopril  20 mg Per NG tube Daily    metoprolol  5 mg Intravenous Once    senna-docusate 8.6-50 mg  1 tablet Per NG tube BID    sodium chloride 0.9%  10 mL Intravenous Q6H    sodium chloride 0.9%  3 mL Intravenous Q8H    sodium chloride 3%  250 mL Intravenous Once    vitamin D  1,000 Units Per NG tube Daily     Continuous Infusions:     PRN Meds:acetaminophen, dextrose 50%, glucagon (human recombinant), hydrALAZINE, flu vacc [id number] 65yr up(PF), insulin aspart, magnesium sulfate IVPB, magnesium sulfate IVPB, midazolam (PF), pneumoc 13-evette conj-dip cr(PF), potassium chloride **AND**  potassium chloride **AND** potassium chloride, rocuronium, Flushing PICC Protocol **AND** sodium chloride 0.9% **AND** sodium chloride 0.9%, sodium phosphate IVPB, sodium phosphate IVPB, sodium phosphate IVPB    Objective:     Vital Signs (Most Recent):  Temp: 97.6 °F (36.4 °C) (17 1103)  Pulse: 71 (17 1300)  Resp: 19 (17 1300)  BP: 138/78 (17 1300)  SpO2: 98 % (17 1300)  BP Location: Right arm    Vital Signs Range (Last 24H):  Temp:  [97.5 °F (36.4 °C)-98.5 °F (36.9 °C)]   Pulse:  []   Resp:  [11-22]   BP: (107-163)/()   SpO2:  [96 %-100 %]   BP Location: Right arm    Physical Exam   Constitutional: She appears well-developed and well-nourished.   HENT:   Head: Normocephalic.   Eyes: Pupils are equal, round, and reactive to light.   Gaze left    Cardiovascular: Normal rate.    In and out of a fib     Pulmonary/Chest: Effort normal. No respiratory distress.   Abdominal: Soft. She exhibits no distension.   Neurological:   Drowsy   Skin: Skin is warm and dry.   Nursing note and vitals reviewed.      Neurological Exam:   LOC: drowsy and follows simple commands  Language: Expressive aphasia  Speech: Aphasic   Orientation: Aphasic  EOM (CN III, IV, VI): Gaze preference left  Facial Movement (CN VII): lower weakness right lower  Motor*: Arm Left:  Paretic:  4/5, Leg Left:   Paretic:  4/5, Arm Right:   Paretic:  2/5, Leg Right:   Paretic:  2/5  Sensation: intact to light touch, temperature and vibration    NIH Stroke Scale:    Level of Consciousness: 1 - drowsy  LOC Questions: 2 - answers none correctly  LOC Commands: 2 - performs neither correctly  Best Gaze: 1 - partial gaze palsy  Visual: 2 - complete hemianopia  Facial Palsy: 1 - minor  Motor Left Arm: 0 - no drift  Motor Right Arm: 3 - no effort against gravity  Motor Left Le - no drift  Motor Right Leg: 3 - no effort against gravity  Limb Ataxia: 0 - absent  Sensory: 1 - mild to moderate loss  Best Language: 3 -  mute  Dysarthria: 0 - normal articulation  Extinction and Inattention: 0 - no neglect  NIH Stroke Scale Total: 19      Laboratory:  CMP:     Recent Labs  Lab 03/17/17  0145  03/17/17  1152   CALCIUM 8.0*  --   --    ALBUMIN 2.0*  --   --    PROT 5.5*  --   --      < > 141   K 3.6  --  4.1   CO2 22*  --   --    *  --   --    BUN 27*  --   --    CREATININE 0.6  --   --    ALKPHOS 45*  --   --    ALT 32  --   --    AST 46*  --   --    BILITOT 0.5  --   --    < > = values in this interval not displayed.  CBC:     Recent Labs  Lab 03/17/17  0145   WBC 11.24   RBC 2.74*   HGB 8.9*   HCT 28.1*      *   MCH 32.5*   MCHC 31.7*     Lipid Panel:   No results for input(s): CHOL, LDLCALC, HDL, TRIG in the last 168 hours.  Coagulation:     Recent Labs  Lab 03/14/17  0232   INR 1.0     Platelet Aggregation Study: No results for input(s): PLTAGG, PLTAGINTERP, PLTAGREGLACO, ADPPLTAGGREG in the last 168 hours.  Hgb A1C:   No results for input(s): HGBA1C in the last 168 hours.  TSH:   No results for input(s): TSH in the last 168 hours.    Diagnostic Results:  I have personally reviewed  CT head. Date: 3/7/17  Temporal evolution of large left MCA territory  acute infarction with mass effect on the underlying parenchyma, sulci and left lateral ventricle with a 0.3 cm rightward midline shift.  No evidence of hydrocephalus or intracranial hemorrhage in this patient status post tPA.  Continued followup is recommended.     CTA stroke multiphase. Date: 3/7/17  -L M1 stenosis  -R vert narrowing    Echo. Date: 3/7/17  Moderately enlarged LA  CONCLUSIONS     1 - Concentric remodeling.     2 - Normal left ventricular systolic function (EF 60-65%).     3 - Right ventricular enlargement with moderately depressed systolic function.     4 - Left ventricular diastolic dysfunction.     5 - Biatrial enlargement.     6 - Mild aortic stenosis, MATEO = 1.6 cm2, peak velocity = 2.0 m/s, mean gradient = 10 mmHg.     7 - Severe  tricuspid regurgitation.     8 - Pulmonary hypertension. The estimated PA systolic pressure is 102 mmHg.     9 - Increased central venous pressure.          Assessment/Plan:     81 year old female with L MCA stroke with right hemiparesis, and aphasia. History of afib. Received tpa on 3/6/17  Discussion with NCC team regarding goals of care noted.       3/10/17 Patient was made DNR/DNI, discussed prognosis with family, patient has some movement in R side and would like to give her more time to recover before determining long term prognosis. Neurologically stable    3/11/17 Neurologically stable, more alert today    03/17/17 Patient following simple commands and had PEG placed yesterday, continued a fib with RVR being treated with metoprolol and diltiazem, also patient has PNA which is being treated with cefepime and vancomycin      * Embolic stroke involving left middle cerebral artery  Likely cardioembolic in etiology, given recent Dx of afib not on anticoagulation.  Patient now dnr /dni     Antithrombotics for secondary stroke prevention: asa 81    Statins for secondary stroke prevention and hyperlipidemia, if present: Atorvastatin- 40 mg oral daily.     Aggressive risk factor modification: Hypertension, High Cholesterol, Diet, Exercise and Obesity, Rehab Efforts: Physical Therapy, Occupational Therapy, Speech and Language Pathology and Physical Medicine and Rehabilitation    Diagnostics: Ordered/Pending  MRI head without contrast to assess brain parenchyma    VTE Prophylaxis: heparin sq    BP Parameters: SBP <180    Nursing Orders: Neuro checks- q1h, Antiembolic stockings, Swallowing evaluation by Nursing, Out of bed BID, Avoid Mace catheter, Pneumatic compression device, Stroke Education, Blood glucose target 100-130, Up with assistance and IV Fluids: Per primary team    Cytotoxic cerebral edema  As seen on imaging from Acute ischemic stroke - L MCA     Hypothyroidism  Stroke RF  Continue synthroid    Essential  hypertension  Stroke RF  SBP goal <180  Management per primary team    Paroxysmal atrial fibrillation  Stroke RF.  Likely etiology of patient's stroke.  Plan to start AC this admission.  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

## 2017-03-17 NOTE — PROGRESS NOTES
SUBJECTIVE:  Pt seen and examined. Had PEG performed yesterday. Currently clamped for meds.       OBJECTIVE:  Temp:  [97.5 °F (36.4 °C)-98.5 °F (36.9 °C)]   Pulse:  []   Resp:  [10-32]   BP: (107-184)/()   SpO2:  [96 %-100 %]   Nad  rrr  cta bilaterally  abd- soft, non-distended, PEG in place, c/d/i.     I & O (Last 24H):  Intake/Output Summary (Last 24 hours) at 03/17/17 0737  Last data filed at 03/17/17 0700   Gross per 24 hour   Intake             1890 ml   Output             1750 ml   Net              140 ml       Lab Results   Component Value Date    WBC 11.24 03/17/2017    HGB 8.9 (L) 03/17/2017    HCT 28.1 (L) 03/17/2017     (H) 03/17/2017     03/17/2017     Lab Results   Component Value Date    CREATININE 0.6 03/17/2017    BUN 27 (H) 03/17/2017     03/17/2017    K 3.6 03/17/2017     (H) 03/17/2017    CO2 22 (L) 03/17/2017    CALCIUM 8.0 (L) 03/17/2017    PHOS 3.2 03/17/2017    MG 1.3 (L) 03/17/2017       ASSESSMENT/PLAN:   Zena Aguirre is a 81 y.o. female POD 1 from PEG placement.     -okay to use PEG for tube feeds starting today  -continue care per primary  -please contact for any further assistance    David Mendoza PGY5

## 2017-03-18 LAB
ALBUMIN SERPL BCP-MCNC: 1.9 G/DL
ALP SERPL-CCNC: 51 U/L
ALT SERPL W/O P-5'-P-CCNC: 33 U/L
ANION GAP SERPL CALC-SCNC: 7 MMOL/L
AST SERPL-CCNC: 47 U/L
BASOPHILS # BLD AUTO: 0.02 K/UL
BASOPHILS NFR BLD: 0.2 %
BILIRUB SERPL-MCNC: 0.4 MG/DL
BUN SERPL-MCNC: 29 MG/DL
CALCIUM SERPL-MCNC: 8 MG/DL
CHLORIDE SERPL-SCNC: 110 MMOL/L
CO2 SERPL-SCNC: 21 MMOL/L
CREAT SERPL-MCNC: 0.7 MG/DL
DIFFERENTIAL METHOD: ABNORMAL
EOSINOPHIL # BLD AUTO: 0.4 K/UL
EOSINOPHIL NFR BLD: 2.9 %
ERYTHROCYTE [DISTWIDTH] IN BLOOD BY AUTOMATED COUNT: 13.6 %
EST. GFR  (AFRICAN AMERICAN): >60 ML/MIN/1.73 M^2
EST. GFR  (NON AFRICAN AMERICAN): >60 ML/MIN/1.73 M^2
GLUCOSE SERPL-MCNC: 113 MG/DL
HCT VFR BLD AUTO: 29.1 %
HGB BLD-MCNC: 9.3 G/DL
LYMPHOCYTES # BLD AUTO: 2.2 K/UL
LYMPHOCYTES NFR BLD: 17 %
MAGNESIUM SERPL-MCNC: 1.7 MG/DL
MCH RBC QN AUTO: 31.7 PG
MCHC RBC AUTO-ENTMCNC: 32 %
MCV RBC AUTO: 99 FL
MONOCYTES # BLD AUTO: 1.1 K/UL
MONOCYTES NFR BLD: 8.8 %
NEUTROPHILS # BLD AUTO: 9 K/UL
NEUTROPHILS NFR BLD: 70.2 %
PHOSPHATE SERPL-MCNC: 3.5 MG/DL
PLATELET # BLD AUTO: 249 K/UL
PMV BLD AUTO: 10.4 FL
POCT GLUCOSE: 114 MG/DL (ref 70–110)
POCT GLUCOSE: 134 MG/DL (ref 70–110)
POCT GLUCOSE: 138 MG/DL (ref 70–110)
POCT GLUCOSE: 153 MG/DL (ref 70–110)
POTASSIUM SERPL-SCNC: 3.6 MMOL/L
PROT SERPL-MCNC: 5.5 G/DL
RBC # BLD AUTO: 2.93 M/UL
SODIUM SERPL-SCNC: 136 MMOL/L
SODIUM SERPL-SCNC: 136 MMOL/L
SODIUM SERPL-SCNC: 137 MMOL/L
SODIUM SERPL-SCNC: 138 MMOL/L
T3FREE SERPL-MCNC: 1.3 PG/ML
T4 SERPL-MCNC: 3.4 UG/DL
TSH SERPL DL<=0.005 MIU/L-ACNC: 2.08 UIU/ML
WBC # BLD AUTO: 12.76 K/UL

## 2017-03-18 PROCEDURE — 63600175 PHARM REV CODE 636 W HCPCS: Performed by: PHYSICIAN ASSISTANT

## 2017-03-18 PROCEDURE — 94761 N-INVAS EAR/PLS OXIMETRY MLT: CPT

## 2017-03-18 PROCEDURE — 99233 SBSQ HOSP IP/OBS HIGH 50: CPT | Mod: GC,,, | Performed by: PSYCHIATRY & NEUROLOGY

## 2017-03-18 PROCEDURE — 25000242 PHARM REV CODE 250 ALT 637 W/ HCPCS: Performed by: PHYSICIAN ASSISTANT

## 2017-03-18 PROCEDURE — 85025 COMPLETE CBC W/AUTO DIFF WBC: CPT

## 2017-03-18 PROCEDURE — 97110 THERAPEUTIC EXERCISES: CPT

## 2017-03-18 PROCEDURE — 25000003 PHARM REV CODE 250: Performed by: PHYSICIAN ASSISTANT

## 2017-03-18 PROCEDURE — 84100 ASSAY OF PHOSPHORUS: CPT

## 2017-03-18 PROCEDURE — 20000000 HC ICU ROOM

## 2017-03-18 PROCEDURE — 84481 FREE ASSAY (FT-3): CPT

## 2017-03-18 PROCEDURE — 25000003 PHARM REV CODE 250: Performed by: PSYCHIATRY & NEUROLOGY

## 2017-03-18 PROCEDURE — 94668 MNPJ CHEST WALL SBSQ: CPT

## 2017-03-18 PROCEDURE — 25000003 PHARM REV CODE 250: Performed by: STUDENT IN AN ORGANIZED HEALTH CARE EDUCATION/TRAINING PROGRAM

## 2017-03-18 PROCEDURE — 81001 URINALYSIS AUTO W/SCOPE: CPT

## 2017-03-18 PROCEDURE — 80053 COMPREHEN METABOLIC PANEL: CPT

## 2017-03-18 PROCEDURE — 99900035 HC TECH TIME PER 15 MIN (STAT)

## 2017-03-18 PROCEDURE — 51702 INSERT TEMP BLADDER CATH: CPT

## 2017-03-18 PROCEDURE — 94640 AIRWAY INHALATION TREATMENT: CPT

## 2017-03-18 PROCEDURE — 83735 ASSAY OF MAGNESIUM: CPT

## 2017-03-18 PROCEDURE — 84295 ASSAY OF SERUM SODIUM: CPT | Mod: 91

## 2017-03-18 PROCEDURE — 84443 ASSAY THYROID STIM HORMONE: CPT

## 2017-03-18 PROCEDURE — 87040 BLOOD CULTURE FOR BACTERIA: CPT

## 2017-03-18 PROCEDURE — 84436 ASSAY OF TOTAL THYROXINE: CPT

## 2017-03-18 RX ORDER — LISINOPRIL 20 MG/1
20 TABLET ORAL 2 TIMES DAILY
Status: DISCONTINUED | OUTPATIENT
Start: 2017-03-18 | End: 2017-03-22

## 2017-03-18 RX ORDER — POLYETHYLENE GLYCOL 3350 17 G/17G
17 POWDER, FOR SOLUTION ORAL DAILY
Status: DISCONTINUED | OUTPATIENT
Start: 2017-03-18 | End: 2017-03-21

## 2017-03-18 RX ADMIN — HEPARIN SODIUM 5000 UNITS: 5000 INJECTION, SOLUTION INTRAVENOUS; SUBCUTANEOUS at 02:03

## 2017-03-18 RX ADMIN — LISINOPRIL 20 MG: 20 TABLET ORAL at 09:03

## 2017-03-18 RX ADMIN — Medication 3 ML: at 05:03

## 2017-03-18 RX ADMIN — CALCIUM 500 MG: 500 TABLET ORAL at 08:03

## 2017-03-18 RX ADMIN — STANDARDIZED SENNA CONCENTRATE AND DOCUSATE SODIUM 1 TABLET: 8.6; 5 TABLET, FILM COATED ORAL at 08:03

## 2017-03-18 RX ADMIN — STANDARDIZED SENNA CONCENTRATE AND DOCUSATE SODIUM 1 TABLET: 8.6; 5 TABLET, FILM COATED ORAL at 09:03

## 2017-03-18 RX ADMIN — POTASSIUM CHLORIDE 40 MEQ: 400 INJECTION, SOLUTION INTRAVENOUS at 03:03

## 2017-03-18 RX ADMIN — POLYETHYLENE GLYCOL 3350 17 G: 17 POWDER, FOR SOLUTION ORAL at 11:03

## 2017-03-18 RX ADMIN — DILTIAZEM HYDROCHLORIDE 60 MG: 60 TABLET, FILM COATED ORAL at 05:03

## 2017-03-18 RX ADMIN — VITAMIN D, TAB 1000IU (100/BT) 1000 UNITS: 25 TAB at 08:03

## 2017-03-18 RX ADMIN — IPRATROPIUM BROMIDE AND ALBUTEROL SULFATE 3 ML: .5; 3 SOLUTION RESPIRATORY (INHALATION) at 12:03

## 2017-03-18 RX ADMIN — IPRATROPIUM BROMIDE AND ALBUTEROL SULFATE 3 ML: .5; 3 SOLUTION RESPIRATORY (INHALATION) at 03:03

## 2017-03-18 RX ADMIN — ATORVASTATIN CALCIUM 40 MG: 20 TABLET, FILM COATED ORAL at 08:03

## 2017-03-18 RX ADMIN — VITAMIN C 1 TABLET: TAB at 08:03

## 2017-03-18 RX ADMIN — INSULIN ASPART 2 UNITS: 100 INJECTION, SOLUTION INTRAVENOUS; SUBCUTANEOUS at 11:03

## 2017-03-18 RX ADMIN — Medication 3 ML: at 09:03

## 2017-03-18 RX ADMIN — CALCIUM 500 MG: 500 TABLET ORAL at 05:03

## 2017-03-18 RX ADMIN — HYDRALAZINE HYDROCHLORIDE 10 MG: 20 INJECTION INTRAMUSCULAR; INTRAVENOUS at 04:03

## 2017-03-18 RX ADMIN — IPRATROPIUM BROMIDE AND ALBUTEROL SULFATE 3 ML: .5; 3 SOLUTION RESPIRATORY (INHALATION) at 11:03

## 2017-03-18 RX ADMIN — LISINOPRIL 20 MG: 20 TABLET ORAL at 08:03

## 2017-03-18 RX ADMIN — HEPARIN SODIUM 5000 UNITS: 5000 INJECTION, SOLUTION INTRAVENOUS; SUBCUTANEOUS at 05:03

## 2017-03-18 RX ADMIN — DILTIAZEM HYDROCHLORIDE 60 MG: 60 TABLET, FILM COATED ORAL at 11:03

## 2017-03-18 RX ADMIN — ACETAMINOPHEN 650 MG: 325 TABLET ORAL at 09:03

## 2017-03-18 RX ADMIN — HEPARIN SODIUM 5000 UNITS: 5000 INJECTION, SOLUTION INTRAVENOUS; SUBCUTANEOUS at 09:03

## 2017-03-18 RX ADMIN — Medication 10 ML: at 05:03

## 2017-03-18 RX ADMIN — IPRATROPIUM BROMIDE AND ALBUTEROL SULFATE 3 ML: .5; 3 SOLUTION RESPIRATORY (INHALATION) at 07:03

## 2017-03-18 RX ADMIN — ASPIRIN 81 MG CHEWABLE TABLET 81 MG: 81 TABLET CHEWABLE at 08:03

## 2017-03-18 RX ADMIN — Medication 3 ML: at 02:03

## 2017-03-18 RX ADMIN — MAGNESIUM SULFATE IN WATER 2 G: 40 INJECTION, SOLUTION INTRAVENOUS at 03:03

## 2017-03-18 RX ADMIN — LEVOTHYROXINE SODIUM 25 MCG: 25 TABLET ORAL at 06:03

## 2017-03-18 RX ADMIN — HYDRALAZINE HYDROCHLORIDE 10 MG: 20 INJECTION INTRAMUSCULAR; INTRAVENOUS at 11:03

## 2017-03-18 NOTE — PT/OT/SLP PROGRESS
"Occupational Therapy  Treatment    Zena Aguirre   MRN: [id number]   Admitting Diagnosis: Embolic stroke involving left middle cerebral artery    OT Date of Treatment: 17   OT Start Time: 625  OT Stop Time: 650  OT Total Time (min): 25 min    Billable Minutes:  Therapeutic Exercise 25    General Precautions: Standard, aphasia, NPO, fall, aspiration  Orthopedic Precautions: N/A  Braces: N/A    Do you have any cultural, spiritual, Congregation conflicts, given your current situation?: [place]ae[place]    Subjective:  Communicated with nurse prior to session.  Patient:  Nonverbal  :  "I want her to come home and drive again."  "We know she understands because she smiles when family is around."  Pain Ratin/10   Pain Rating Post-Intervention: 0/10    Objective:  Patient found with: arterial line, blood pressure cuff, telemetry, SCD, peripheral IV, pulse ox (continuous), PICC line, sesay catheter (PEG)    present.    Functional Mobility:  Bed Mobility:  Rolling/Turning to Left: Total assistance  Rolling/Turning Right: Total assistance  Limited to in bed activity    Transfers:  Dependent drawsheet transfers  Limited to in bed activity    Activities of Daily Living:  Feeding Level of Assistance:  (NPO)  Grooming Position:  (supine)  Grooming Level of Assistance: Total assistance     Additional Treatment:   Patient education provided on role of OT, daily orientation, ROM and positioning. Daily orientation provided. PROM performed right UE/LE and AAROM left UE/LEs one set x 10 rep in all planes of motion with stretches provided at end range; sustained stretch provided for right UE external rotation, wrist extension and ankle dorsiflexion. Assistance and facilitation provided for upward rotation of the scapula during shoulder flexion and abduction. Patient alert throughout the session; not following commands.  Unable to model commands.  Unable to communicate with head nods. Minimal edema noted right hand; " positioning provided in elevation. Positioning provided for midline orientation with bilateral UEs elevated and heels lifted off mattress. Gentle cervical rotation provided.  present during the session.   Continued education, patient/ family training recommended. Patient's functional status and disposition recommendation discussed with patient's  and nurse. White board updated in patient's room. OT asked if there were any other questions; patient/ family had no further questions.       AM-PAC 6 CLICK ADL   How much help from another person does this patient currently need?   1 = Unable, Total/Dependent Assistance  2 = A lot, Maximum/Moderate Assistance  3 = A little, Minimum/Contact Guard/Supervision  4 = None, Modified [place]/Independent    Putting on and taking off regular lower body clothing? : 1  Bathing (including washing, rinsing, drying)?: 1  Toileting, which includes using toilet, bedpan, or urinal? : 1  Putting on and taking off regular upper body clothing?: 1  Taking care of personal grooming such as brushing teeth?: 1  Eating meals?: 1  Total Score: 6     AM-PAC Raw Score CMS G-Code Modifier Level of Impairment Assistance   6 % Total / Unable   7 - 9 CM 80 - 100% Maximal Assist   10 - 14 CL 60 - 80% Moderate Assist   15 - 19 CK 40 - 60% Moderate Assist   20 - 22 CJ 20 - 40% Minimal Assist   23 CI 1-20% SBA / CGA   24 CH 0% Independent/ Mod I     Patient left supine with all lines intact and call button in reach    ASSESSMENT:  Zena Aguirre is a 81 y.o. female with a medical diagnosis of Embolic stroke involving left middle cerebral artery and presents with performance deficits of physical skills including impaired balance, mobility, strength, dexterity, fine motor coordination, gross motor coordination, sensation and endurance; demonstrating performance deficits of cognitive skills including impaired attention, perception, understanding, problem solving, sequencing,  communication (aphasia) and memory all resulting in inability organizing occupational performance in a timely and safe manner; demonstrating performance deficits of psychosocial skills including impairments of interpersonal interactions and coping strategies which are skills necessary to successfully and appropriately participate in everyday tasks and social situations. These performance deficits have resulted in activity limitations including but not limited to: bed mobility, transfers, ascending/ descending stairs, walking short and long distances, walking around obstacles, transitional movement patterns (kneeling, bending); eating, upper body dressing, lower body dressing, brushing teeth, flossing, washing hair, toileting, bathing, carrying objects, driving, shopping, meal preparation, and sewing. Patient's role as wife, mother, grandmother and independent caretaker for self has been affected. Patient will benefit from skilled OT services to maximize level of independence with self-care skills and functional mobility. Will benefit from SNF.    Rehab identified problem list/impairments: Rehab identified problem list/impairments: weakness, impaired self care skills, impaired balance, decreased coordination, decreased safety awareness, decreased ROM, impaired endurance, impaired functional mobilty, impaired sensation, gait instability, impaired cognition, visual deficits, decreased upper extremity function, decreased lower extremity function, abnormal tone, impaired fine motor, impaired coordination    Rehab potential is good.    Activity tolerance: Good    Discharge recommendations: Discharge Facility/Level Of Care Needs: nursing facility, skilled 2* global aphasia    Barriers to discharge: Barriers to Discharge: Inaccessible home environment, Decreased caregiver support    Equipment recommendations: hospital bed     GOALS:   Occupational Therapy Goals        Problem: Occupational Therapy Goal    Goal Priority  Disciplines Outcome Interventions   Occupational Therapy Goal     OT, PT/OT Ongoing (interventions implemented as appropriate)    Description:  Goals set 3/18 to be addressed for 14 days with expiration date, 4/1:  Patient will increase functional independence with ADLs by performing:    Patient will demonstrate rolling to the right with min assist.  Not met   Patient will demonstrate rolling to the left with mod assist.   Not met  Patient will demonstrate supine -sit with mod assist.   Not met  Patient will demonstrate stand pivot transfers with mod assist.   Not met  Patient will demonstrate grooming while standing with mod assist.   Not met  Patient will demonstrate upper body dressing with mod assist while seated EOB.   Not met  Patient will demonstrate lower body dressing with max assist while seated EOB.   Not met  Patient will demonstrate toileting with max assist.   Not met  Patient will demonstrate bathing while seated EOB with max assist.   Not met  Patient will demonstrate ability to follow 3/4 commands.   Not met  atient's family / caregiver will demonstrate independence and safety with assisting patient with self-care skills and functional mobility.     Not met  Patient's family / caregiver will demonstrate independence with providing ROM and changes in bed positioning.   Not met  Patient and/or patient's family will verbalize understanding of stroke prevention guidelines, personal risk factors and stroke warning signs via teachback method.  Not met                       Plan:  Patient to be seen 5 x/week (Mon-Fri) to address the above listed problems via self-care/home management, therapeutic activities, therapeutic exercises, sensory integration, cognitive retraining, neuromuscular re-education  Plan of Care expires: 04/06/17  Plan of Care reviewed with: patient, spouse         Ama VERONA Miner  03/18/2017

## 2017-03-18 NOTE — PLAN OF CARE
Problem: Patient Care Overview  Goal: Plan of Care Review  Outcome: Ongoing (interventions implemented as appropriate)  No acute events throughout the night, VS and assessment per flow sheet, patient progressing towards goal as tolerated. Plan of care reviewed with Zena Aguirre and family, all concerns addressed. Will continue to monitor.

## 2017-03-18 NOTE — PLAN OF CARE
Problem: Occupational Therapy Goal  Goal: Occupational Therapy Goal  Goals to be met by: 3/23/17     Patient will increase functional independence with ADLs by performing:    UE Dressing with Moderate Assistance.  LE Dressing with Maximum Assistance.  Grooming while seated with Moderate Assistance.  Toileting from bedside commode with Maximum Assistance for hygiene and clothing management.   Rolling to Bilateral with Moderate Assistance.   Supine to sit with Maximum Assistance.  Stand pivot transfers with Maximum Assistance.  Pt will follow 4/4 one step commands.   Goals remain appropriate.  VERONA Osorio  3/18/2017

## 2017-03-18 NOTE — PROGRESS NOTES
ICU Progress Note  Neurocritical Care    Admit Date: 3/7/2017  LOS: 11    CC: Embolic stroke involving left middle cerebral artery    SUBJECTIVE:     Interval History/Significant Events:   No acute events overnight    Medications:  Continuous Infusions:     Scheduled Meds:   albuterol-ipratropium 2.5mg-0.5mg/3mL  3 mL Nebulization Q8H    aspirin  81 mg Per NG tube Daily    atorvastatin  40 mg Per NG tube Daily    B-complex with vitamin C  1 tablet Per NG tube Daily    calcium carbonate  500 mg Per NG tube BID WM    diltiaZEM  60 mg Per NG tube Q6H    heparin (porcine)  5,000 Units Subcutaneous Q8H    levothyroxine  25 mcg Per NG tube Before breakfast    lisinopril  20 mg Per NG tube BID    metoprolol  5 mg Intravenous Once    polyethylene glycol  17 g Oral Daily    senna-docusate 8.6-50 mg  1 tablet Per NG tube BID    sodium chloride 0.9%  10 mL Intravenous Q6H    sodium chloride 0.9%  3 mL Intravenous Q8H    sodium chloride 3%  250 mL Intravenous Once    vitamin D  1,000 Units Per NG tube Daily     PRN Meds:.acetaminophen, dextrose 50%, glucagon (human recombinant), hydrALAZINE, flu vacc [id number] 65yr up(PF), insulin aspart, magnesium sulfate IVPB, magnesium sulfate IVPB, midazolam (PF), pneumoc 13-evette conj-dip cr(PF), potassium chloride **AND** potassium chloride **AND** potassium chloride, rocuronium, Flushing PICC Protocol **AND** sodium chloride 0.9% **AND** sodium chloride 0.9%, sodium phosphate IVPB, sodium phosphate IVPB, sodium phosphate IVPB    OBJECTIVE:     I & O (24h):    Intake/Output Summary (Last 24 hours) at 03/18/17 1414  Last data filed at 03/18/17 1300   Gross per 24 hour   Intake             1330 ml   Output             1870 ml   Net             -540 ml       Physical Exam:  Last Vitals:  Vitals:    03/18/17 1300   BP: (!) 149/65   Pulse: 73   Resp: 15   Temp:      Vital Signs (24h Range):   Temp:  [97.7 °F (36.5 °C)-98.7 °F (37.1 °C)] 98.7 °F (37.1 °C)  Pulse:  []  73  Resp:  [9-28] 15  SpO2:  [94 %-100 %] 98 %  BP: (102-176)/(58-90) 149/65  GA: Alert, no acute distress.   HEENT: No scleral icterus or JVD.   Pulmonary: Clear to auscultation A/P/L. No wheezing, crackles, or rhonchi.  Cardiac: RRR S1 & S2 w/o rubs/murmurs/gallops.   Abdominal: Bowel sounds present x 4. No appreciable hepatosplenomegaly.  Skin: multiple areas of achymosis   Neuro:  --GCS: E4 V1 M6  --Mental Status:  Expressive aphasia.  Follows simple commands intermittently.   --CN II-XII grossly intact.   --Pupils 3mm, PERRL.   --Corneal reflex, gag, cough intact.  --moves left extremities spontaneously  --withdraws to pain in right extremities     Invasive Lines:  --Central Line:        PICC Double Lumen 03/08/17 1208 left brachial (Active)   Site Assessment Clean;Dry;Intact;No redness;No swelling 3/13/2017  7:01 AM   Lumen 1 Status Flushed;Blood return noted;Infusing 3/13/2017  7:01 AM   Lumen 2 Status Infusing 3/13/2017  7:01 AM   Length verónica (cm) 36 cm 3/8/2017 12:05 PM   Current Exposed Catheter (cm) 0 cm 3/8/2017 12:05 PM   Extremity Circumference (cm) 25 cm 3/8/2017 12:05 PM   Dressing Type Transparent 3/13/2017  7:01 AM   Dressing Status Biopatch in place;Clean;Dry;Intact 3/13/2017  7:01 AM   Dressing Intervention Dressing reinforced 3/13/2017  7:01 AM   Dressing Change Due 03/15/17 3/13/2017  7:01 AM   Daily Line Review Performed 3/13/2017  7:01 AM       --Arterial Line:      --Surgical Drains/Mace:        NG/OG Tube 03/08/17 0615 [place] sump 14 Fr. Right nostril (Active)   Placement Check placement verified by distal tube length measurement;placement verified by x-ray 3/13/2017  7:01 AM   Distal Tube Length (cm) 63 3/10/2017  3:05 AM   Tolerance no signs/symptoms of discomfort 3/13/2017  7:01 AM   Securement anchored to nostril center w/ adhesive device 3/13/2017  7:01 AM   Clamp Status/Tolerance unclamped;no abdominal discomfort;no abdominal distention;no emesis;no nausea;no residual;no restlessness  "3/13/2017  7:01 AM   Insertion Site Appearance no redness, warmth, tenderness, skin breakdown, drainage 3/13/2017  7:01 AM   Flush/Irrigation flushed w/;water;no resistance met 3/13/2017  7:01 AM   Feeding Method continuous 3/13/2017  7:01 AM   Current Rate (mL/hr) 40 mL/hr 3/13/2017  7:01 AM   Goal Rate (mL/hr) 40 mL/hr 3/13/2017  7:01 AM   Intake (mL) 150 mL 3/13/2017  8:00 AM   Intake (mL) - Formula Tube Feeding 40 3/13/2017 12:00 PM   Residual Amount (ml) 10 ml 3/12/2017  7:01 AM            Urethral Catheter 03/12/17 1300 (Active)   Site Assessment Clean;Intact 3/13/2017  7:01 AM   Collection Container Urimeter 3/13/2017  7:01 AM   Securement Method secured to top of thigh w/ adhesive device 3/13/2017  7:01 AM   Catheter Care Performed yes 3/13/2017  7:01 AM   Reason for Continuing Urinary Catheterization Critically ill in ICU requiring intensive monitoring 3/13/2017  7:01 AM   CAUTI Prevention Bundle StatLock in place w 1" slack;Intact seal between catheter & drainage tubing;Drainage bag off the floor;Green sheeting clip in use;No dependent loops or kinks;Drainage bag not overfilled (<2/3 full);Drainage bag below bladder 3/13/2017  7:01 AM   Output (mL) 450 mL 3/13/2017 12:00 PM       Nutrition: TF held for PEG placement     Labs:  ABG:   No results for input(s): PH, PO2, PCO2, HCO3, POCSATURATED, BE in the last 24 hours.    BMP:    Recent Labs  Lab 03/18/17  0204  03/18/17  1037     < > 136   K 3.6  --   --      --   --    CO2 21*  --   --    BUN 29*  --   --    CREATININE 0.7  --   --    *  --   --    MG 1.7  --   --    PHOS 3.5  --   --    < > = values in this interval not displayed.    LFT:   Lab Results   Component Value Date    AST 47 (H) 03/18/2017    ALT 33 03/18/2017    ALKPHOS 51 (L) 03/18/2017    BILITOT 0.4 03/18/2017    ALBUMIN 1.9 (L) 03/18/2017    PROT 5.5 (L) 03/18/2017       CBC:   Lab Results   Component Value Date    WBC 12.76 (H) 03/18/2017    HGB 9.3 (L) 03/18/2017    HCT " 29.1 (L) 03/18/2017    MCV 99 (H) 03/18/2017     03/18/2017       Microbiology x 7d:   Microbiology Results (last 7 days)     Procedure Component Value Units Date/Time    Culture, Respiratory [[id number]] Collected:  03/13/17 1608    Order Status:  Completed Specimen:  Respiratory from Sputum Updated:  03/16/17 0950     Respiratory Culture Normal respiratory richard     Gram Stain (Respiratory) <10 epithelial cells per low power field.     Gram Stain (Respiratory) Rare WBC's     Gram Stain (Respiratory) Few Gram positive cocci     Gram Stain (Respiratory) Rare budding yeast     Gram Stain (Respiratory) Rare Gram negative rods          Imaging:  I have personally reviewed.     ASSESSMENT/PLAN:     1. Malignant LMCA ischemic stroke  2. Cerebral edema  3. Subfalcine herniation  4. Aphasia   5. AF  6. Aspiration pneumonia   7. Constipation     A/P:  - Neurological exam is unchanged   - Currently on RA  - Continue asa  - Heart rate is under goodcontrol today, continue current regimen   - Increase lisinopril dose for better BP control   - S/p PEG  - TF at goal  - Add merlex for bowel regimen   - [facility]    Care level 3.

## 2017-03-19 LAB
ALBUMIN SERPL BCP-MCNC: 2 G/DL
ALP SERPL-CCNC: 53 U/L
ALT SERPL W/O P-5'-P-CCNC: 29 U/L
ANION GAP SERPL CALC-SCNC: 6 MMOL/L
AST SERPL-CCNC: 46 U/L
BACTERIA #/AREA URNS AUTO: ABNORMAL /HPF
BASOPHILS # BLD AUTO: 0.04 K/UL
BASOPHILS NFR BLD: 0.3 %
BILIRUB SERPL-MCNC: 0.5 MG/DL
BILIRUB UR QL STRIP: NEGATIVE
BUN SERPL-MCNC: 25 MG/DL
CALCIUM SERPL-MCNC: 8.3 MG/DL
CHLORIDE SERPL-SCNC: 106 MMOL/L
CLARITY UR REFRACT.AUTO: ABNORMAL
CO2 SERPL-SCNC: 24 MMOL/L
COLOR UR AUTO: YELLOW
CREAT SERPL-MCNC: 0.7 MG/DL
DIFFERENTIAL METHOD: ABNORMAL
EOSINOPHIL # BLD AUTO: 0.2 K/UL
EOSINOPHIL NFR BLD: 1.4 %
ERYTHROCYTE [DISTWIDTH] IN BLOOD BY AUTOMATED COUNT: 13.8 %
EST. GFR  (AFRICAN AMERICAN): >60 ML/MIN/1.73 M^2
EST. GFR  (NON AFRICAN AMERICAN): >60 ML/MIN/1.73 M^2
GLUCOSE SERPL-MCNC: 106 MG/DL
GLUCOSE UR QL STRIP: NEGATIVE
HCT VFR BLD AUTO: 29.1 %
HGB BLD-MCNC: 9.5 G/DL
HGB UR QL STRIP: NEGATIVE
HYALINE CASTS UR QL AUTO: 21 /LPF
KETONES UR QL STRIP: NEGATIVE
LEUKOCYTE ESTERASE UR QL STRIP: ABNORMAL
LYMPHOCYTES # BLD AUTO: 2.2 K/UL
LYMPHOCYTES NFR BLD: 14.2 %
MAGNESIUM SERPL-MCNC: 1.7 MG/DL
MCH RBC QN AUTO: 32.3 PG
MCHC RBC AUTO-ENTMCNC: 32.6 %
MCV RBC AUTO: 99 FL
MICROSCOPIC COMMENT: ABNORMAL
MONOCYTES # BLD AUTO: 1.6 K/UL
MONOCYTES NFR BLD: 10.5 %
NEUTROPHILS # BLD AUTO: 11.2 K/UL
NEUTROPHILS NFR BLD: 72.5 %
NITRITE UR QL STRIP: NEGATIVE
PH UR STRIP: 8 [PH] (ref 5–8)
PHOSPHATE SERPL-MCNC: 3 MG/DL
PLATELET # BLD AUTO: 294 K/UL
PMV BLD AUTO: 10.2 FL
POCT GLUCOSE: 125 MG/DL (ref 70–110)
POCT GLUCOSE: 129 MG/DL (ref 70–110)
POCT GLUCOSE: 132 MG/DL (ref 70–110)
POCT GLUCOSE: 133 MG/DL (ref 70–110)
POTASSIUM SERPL-SCNC: 3.9 MMOL/L
PROT SERPL-MCNC: 5.7 G/DL
PROT UR QL STRIP: NEGATIVE
RBC # BLD AUTO: 2.94 M/UL
RBC #/AREA URNS AUTO: 12 /HPF (ref 0–4)
SODIUM SERPL-SCNC: 136 MMOL/L
SP GR UR STRIP: 1.01 (ref 1–1.03)
URN SPEC COLLECT METH UR: ABNORMAL
UROBILINOGEN UR STRIP-ACNC: NEGATIVE EU/DL
VANCOMYCIN TROUGH SERPL-MCNC: 5.5 UG/ML
WBC # BLD AUTO: 15.37 K/UL
WBC #/AREA URNS AUTO: 19 /HPF (ref 0–5)

## 2017-03-19 PROCEDURE — 20000000 HC ICU ROOM

## 2017-03-19 PROCEDURE — 25000003 PHARM REV CODE 250: Performed by: PHYSICIAN ASSISTANT

## 2017-03-19 PROCEDURE — 25000003 PHARM REV CODE 250: Performed by: PSYCHIATRY & NEUROLOGY

## 2017-03-19 PROCEDURE — 51798 US URINE CAPACITY MEASURE: CPT

## 2017-03-19 PROCEDURE — 87205 SMEAR GRAM STAIN: CPT

## 2017-03-19 PROCEDURE — 94640 AIRWAY INHALATION TREATMENT: CPT

## 2017-03-19 PROCEDURE — 31720 CLEARANCE OF AIRWAYS: CPT

## 2017-03-19 PROCEDURE — 51702 INSERT TEMP BLADDER CATH: CPT

## 2017-03-19 PROCEDURE — 99291 CRITICAL CARE FIRST HOUR: CPT | Mod: GC,,, | Performed by: PSYCHIATRY & NEUROLOGY

## 2017-03-19 PROCEDURE — 84100 ASSAY OF PHOSPHORUS: CPT

## 2017-03-19 PROCEDURE — 85025 COMPLETE CBC W/AUTO DIFF WBC: CPT

## 2017-03-19 PROCEDURE — 25000003 PHARM REV CODE 250: Performed by: STUDENT IN AN ORGANIZED HEALTH CARE EDUCATION/TRAINING PROGRAM

## 2017-03-19 PROCEDURE — 94668 MNPJ CHEST WALL SBSQ: CPT

## 2017-03-19 PROCEDURE — 63600175 PHARM REV CODE 636 W HCPCS: Performed by: PHYSICIAN ASSISTANT

## 2017-03-19 PROCEDURE — 99900035 HC TECH TIME PER 15 MIN (STAT)

## 2017-03-19 PROCEDURE — 83735 ASSAY OF MAGNESIUM: CPT

## 2017-03-19 PROCEDURE — 80202 ASSAY OF VANCOMYCIN: CPT

## 2017-03-19 PROCEDURE — 63600175 PHARM REV CODE 636 W HCPCS: Performed by: PSYCHIATRY & NEUROLOGY

## 2017-03-19 PROCEDURE — 89220 SPUTUM SPECIMEN COLLECTION: CPT

## 2017-03-19 PROCEDURE — 87086 URINE CULTURE/COLONY COUNT: CPT

## 2017-03-19 PROCEDURE — 25000242 PHARM REV CODE 250 ALT 637 W/ HCPCS: Performed by: PHYSICIAN ASSISTANT

## 2017-03-19 PROCEDURE — 87070 CULTURE OTHR SPECIMN AEROBIC: CPT

## 2017-03-19 PROCEDURE — 80053 COMPREHEN METABOLIC PANEL: CPT

## 2017-03-19 PROCEDURE — 94761 N-INVAS EAR/PLS OXIMETRY MLT: CPT

## 2017-03-19 RX ORDER — METOPROLOL TARTRATE 1 MG/ML
5 INJECTION, SOLUTION INTRAVENOUS ONCE
Status: COMPLETED | OUTPATIENT
Start: 2017-03-19 | End: 2017-03-19

## 2017-03-19 RX ORDER — SYRING-NEEDL,DISP,INSUL,0.3 ML 29 G X1/2"
296 SYRINGE, EMPTY DISPOSABLE MISCELLANEOUS ONCE
Status: COMPLETED | OUTPATIENT
Start: 2017-03-19 | End: 2017-03-19

## 2017-03-19 RX ORDER — CEFEPIME HYDROCHLORIDE 1 G/50ML
1 INJECTION, SOLUTION INTRAVENOUS
Status: DISCONTINUED | OUTPATIENT
Start: 2017-03-19 | End: 2017-03-22

## 2017-03-19 RX ADMIN — Medication 3 ML: at 05:03

## 2017-03-19 RX ADMIN — DILTIAZEM HYDROCHLORIDE 60 MG: 60 TABLET, FILM COATED ORAL at 04:03

## 2017-03-19 RX ADMIN — POLYETHYLENE GLYCOL 3350 17 G: 17 POWDER, FOR SOLUTION ORAL at 08:03

## 2017-03-19 RX ADMIN — ASPIRIN 81 MG CHEWABLE TABLET 81 MG: 81 TABLET CHEWABLE at 08:03

## 2017-03-19 RX ADMIN — LISINOPRIL 20 MG: 20 TABLET ORAL at 08:03

## 2017-03-19 RX ADMIN — CEFEPIME HYDROCHLORIDE 1 G: 1 INJECTION, POWDER, FOR SOLUTION INTRAMUSCULAR; INTRAVENOUS at 09:03

## 2017-03-19 RX ADMIN — HYDRALAZINE HYDROCHLORIDE 10 MG: 20 INJECTION INTRAMUSCULAR; INTRAVENOUS at 11:03

## 2017-03-19 RX ADMIN — HEPARIN SODIUM 5000 UNITS: 5000 INJECTION, SOLUTION INTRAVENOUS; SUBCUTANEOUS at 09:03

## 2017-03-19 RX ADMIN — DILTIAZEM HYDROCHLORIDE 60 MG: 60 TABLET, FILM COATED ORAL at 05:03

## 2017-03-19 RX ADMIN — Medication 10 ML: at 05:03

## 2017-03-19 RX ADMIN — ACETAMINOPHEN 650 MG: 325 TABLET ORAL at 04:03

## 2017-03-19 RX ADMIN — IPRATROPIUM BROMIDE AND ALBUTEROL SULFATE 3 ML: .5; 3 SOLUTION RESPIRATORY (INHALATION) at 11:03

## 2017-03-19 RX ADMIN — HEPARIN SODIUM 5000 UNITS: 5000 INJECTION, SOLUTION INTRAVENOUS; SUBCUTANEOUS at 05:03

## 2017-03-19 RX ADMIN — STANDARDIZED SENNA CONCENTRATE AND DOCUSATE SODIUM 1 TABLET: 8.6; 5 TABLET, FILM COATED ORAL at 08:03

## 2017-03-19 RX ADMIN — CEFEPIME HYDROCHLORIDE 1 G: 1 INJECTION, POWDER, FOR SOLUTION INTRAMUSCULAR; INTRAVENOUS at 11:03

## 2017-03-19 RX ADMIN — CALCIUM 500 MG: 500 TABLET ORAL at 04:03

## 2017-03-19 RX ADMIN — HEPARIN SODIUM 5000 UNITS: 5000 INJECTION, SOLUTION INTRAVENOUS; SUBCUTANEOUS at 02:03

## 2017-03-19 RX ADMIN — IPRATROPIUM BROMIDE AND ALBUTEROL SULFATE 3 ML: .5; 3 SOLUTION RESPIRATORY (INHALATION) at 03:03

## 2017-03-19 RX ADMIN — Medication 3 ML: at 02:03

## 2017-03-19 RX ADMIN — VITAMIN C 1 TABLET: TAB at 08:03

## 2017-03-19 RX ADMIN — LISINOPRIL 20 MG: 20 TABLET ORAL at 09:03

## 2017-03-19 RX ADMIN — Medication 10 ML: at 12:03

## 2017-03-19 RX ADMIN — LEVOTHYROXINE SODIUM 25 MCG: 25 TABLET ORAL at 05:03

## 2017-03-19 RX ADMIN — DILTIAZEM HYDROCHLORIDE 60 MG: 60 TABLET, FILM COATED ORAL at 11:03

## 2017-03-19 RX ADMIN — MAGESIUM CITRATE 296 ML: 1.75 LIQUID ORAL at 09:03

## 2017-03-19 RX ADMIN — METOPROLOL TARTRATE 5 MG: 5 INJECTION, SOLUTION INTRAVENOUS at 04:03

## 2017-03-19 RX ADMIN — Medication 10 ML: at 11:03

## 2017-03-19 RX ADMIN — VITAMIN D, TAB 1000IU (100/BT) 1000 UNITS: 25 TAB at 09:03

## 2017-03-19 RX ADMIN — HYDRALAZINE HYDROCHLORIDE 10 MG: 20 INJECTION INTRAMUSCULAR; INTRAVENOUS at 03:03

## 2017-03-19 RX ADMIN — Medication 1000 MG: at 11:03

## 2017-03-19 RX ADMIN — CALCIUM 500 MG: 500 TABLET ORAL at 08:03

## 2017-03-19 RX ADMIN — IPRATROPIUM BROMIDE AND ALBUTEROL SULFATE 3 ML: .5; 3 SOLUTION RESPIRATORY (INHALATION) at 07:03

## 2017-03-19 RX ADMIN — Medication 3 ML: at 09:03

## 2017-03-19 RX ADMIN — ATORVASTATIN CALCIUM 40 MG: 20 TABLET, FILM COATED ORAL at 08:03

## 2017-03-19 NOTE — PROGRESS NOTES
ICU Progress Note  Neurocritical Care    Admit Date: 3/7/2017  LOS: 12    CC: Embolic stroke involving left middle cerebral artery    SUBJECTIVE:     Interval History/Significant Events:   No acute events overnight    Medications:  Continuous Infusions:     Scheduled Meds:   albuterol-ipratropium 2.5mg-0.5mg/3mL  3 mL Nebulization Q8H    aspirin  81 mg Per NG tube Daily    atorvastatin  40 mg Per NG tube Daily    B-complex with vitamin C  1 tablet Per NG tube Daily    calcium carbonate  500 mg Per NG tube BID WM    ceFEPime (MAXIPIME) IVPB  1 g Intravenous Q12H    diltiaZEM  60 mg Per NG tube Q6H    heparin (porcine)  5,000 Units Subcutaneous Q8H    levothyroxine  25 mcg Per NG tube Before breakfast    lisinopril  20 mg Per NG tube BID    metoprolol  5 mg Intravenous Once    polyethylene glycol  17 g Oral Daily    senna-docusate 8.6-50 mg  1 tablet Per NG tube BID    sodium chloride 0.9%  10 mL Intravenous Q6H    sodium chloride 0.9%  3 mL Intravenous Q8H    sodium chloride 3%  250 mL Intravenous Once    vancomycin (VANCOCIN) IVPB  15 mg/kg (Dosing Weight) Intravenous Q24H    vitamin D  1,000 Units Per NG tube Daily     PRN Meds:.acetaminophen, dextrose 50%, glucagon (human recombinant), hydrALAZINE, flu vacc [id number] 65yr up(PF), insulin aspart, magnesium sulfate IVPB, magnesium sulfate IVPB, midazolam (PF), pneumoc 13-evette conj-dip cr(PF), potassium chloride **AND** potassium chloride **AND** potassium chloride, rocuronium, Flushing PICC Protocol **AND** sodium chloride 0.9% **AND** sodium chloride 0.9%, sodium phosphate IVPB, sodium phosphate IVPB, sodium phosphate IVPB    OBJECTIVE:     I & O (24h):    Intake/Output Summary (Last 24 hours) at 03/19/17 1123  Last data filed at 03/19/17 1046   Gross per 24 hour   Intake             1765 ml   Output             2155 ml   Net             -390 ml       Physical Exam:  Last Vitals:  Vitals:    03/19/17 1102   BP: (!) 178/103   Pulse: 101   Resp: (!)  33   Temp:      Vital Signs (24h Range):   Temp:  [97.9 °F (36.6 °C)-101 °F (38.3 °C)] 98.1 °F (36.7 °C)  Pulse:  [] 101  Resp:  [10-38] 33  SpO2:  [96 %-100 %] 96 %  BP: (117-178)/() 178/103  GA: Alert, no acute distress.   HEENT: No scleral icterus or JVD.   Pulmonary: Clear to auscultation A/P/L. No wheezing, crackles, or rhonchi.  Cardiac: RRR S1 & S2 w/o rubs/murmurs/gallops.   Abdominal: Bowel sounds present x 4. No appreciable hepatosplenomegaly.  Skin: multiple areas of achymosis   Neuro:  --GCS: E4 V1 M6  --Mental Status:  Expressive aphasia.  Follows simple commands intermittently.   --CN II-XII grossly intact.   --Pupils 3mm, PERRL.   --Corneal reflex, gag, cough intact.  --moves left extremities spontaneously  --withdraws to pain in right extremities     Invasive Lines:  --Central Line:        PICC Double Lumen 03/08/17 1208 left brachial (Active)   Site Assessment Clean;Dry;Intact;No redness;No swelling 3/13/2017  7:01 AM   Lumen 1 Status Flushed;Blood return noted;Infusing 3/13/2017  7:01 AM   Lumen 2 Status Infusing 3/13/2017  7:01 AM   Length verónica (cm) 36 cm 3/8/2017 12:05 PM   Current Exposed Catheter (cm) 0 cm 3/8/2017 12:05 PM   Extremity Circumference (cm) 25 cm 3/8/2017 12:05 PM   Dressing Type Transparent 3/13/2017  7:01 AM   Dressing Status Biopatch in place;Clean;Dry;Intact 3/13/2017  7:01 AM   Dressing Intervention Dressing reinforced 3/13/2017  7:01 AM   Dressing Change Due 03/15/17 3/13/2017  7:01 AM   Daily Line Review Performed 3/13/2017  7:01 AM       --Arterial Line:      --Surgical Drains/Mace:        NG/OG Tube 03/08/17 0615 [place] sump 14 Fr. Right nostril (Active)   Placement Check placement verified by distal tube length measurement;placement verified by x-ray 3/13/2017  7:01 AM   Distal Tube Length (cm) 63 3/10/2017  3:05 AM   Tolerance no signs/symptoms of discomfort 3/13/2017  7:01 AM   Securement anchored to nostril center w/ adhesive device 3/13/2017  7:01 AM  "  Clamp Status/Tolerance unclamped;no abdominal discomfort;no abdominal distention;no emesis;no nausea;no residual;no restlessness 3/13/2017  7:01 AM   Insertion Site Appearance no redness, warmth, tenderness, skin breakdown, drainage 3/13/2017  7:01 AM   Flush/Irrigation flushed w/;water;no resistance met 3/13/2017  7:01 AM   Feeding Method continuous 3/13/2017  7:01 AM   Current Rate (mL/hr) 40 mL/hr 3/13/2017  7:01 AM   Goal Rate (mL/hr) 40 mL/hr 3/13/2017  7:01 AM   Intake (mL) 150 mL 3/13/2017  8:00 AM   Intake (mL) - Formula Tube Feeding 40 3/13/2017 12:00 PM   Residual Amount (ml) 10 ml 3/12/2017  7:01 AM            Urethral Catheter 03/12/17 1300 (Active)   Site Assessment Clean;Intact 3/13/2017  7:01 AM   Collection Container Urimeter 3/13/2017  7:01 AM   Securement Method secured to top of thigh w/ adhesive device 3/13/2017  7:01 AM   Catheter Care Performed yes 3/13/2017  7:01 AM   Reason for Continuing Urinary Catheterization Critically ill in ICU requiring intensive monitoring 3/13/2017  7:01 AM   CAUTI Prevention Bundle StatLock in place w 1" slack;Intact seal between catheter & drainage tubing;Drainage bag off the floor;Green sheeting clip in use;No dependent loops or kinks;Drainage bag not overfilled (<2/3 full);Drainage bag below bladder 3/13/2017  7:01 AM   Output (mL) 450 mL 3/13/2017 12:00 PM       Nutrition: TF held for PEG placement     Labs:  ABG:   No results for input(s): PH, PO2, PCO2, HCO3, POCSATURATED, BE in the last 24 hours.    BMP:    Recent Labs  Lab 03/19/17  0302      K 3.9      CO2 24   BUN 25*   CREATININE 0.7      MG 1.7   PHOS 3.0       LFT:   Lab Results   Component Value Date    AST 46 (H) 03/19/2017    ALT 29 03/19/2017    ALKPHOS 53 (L) 03/19/2017    BILITOT 0.5 03/19/2017    ALBUMIN 2.0 (L) 03/19/2017    PROT 5.7 (L) 03/19/2017       CBC:   Lab Results   Component Value Date    WBC 15.37 (H) 03/19/2017    HGB 9.5 (L) 03/19/2017    HCT 29.1 (L) 03/19/2017 "    MCV 99 (H) 03/19/2017     03/19/2017       Microbiology x 7d:   Microbiology Results (last 7 days)     Procedure Component Value Units Date/Time    Culture, Respiratory [[id number]] Collected:  03/19/17 1108    Order Status:  Sent Specimen:  Respiratory from Sputum Updated:  03/19/17 1109    Urine culture [[id number]] Collected:  03/19/17 1030    Order Status:  Sent Specimen:  Urine from Urine, Catheterized Updated:  03/19/17 1030    Blood culture [[id number]] Collected:  03/18/17 2211    Order Status:  Completed Specimen:  Blood from Peripheral, Hand, Right Updated:  03/19/17 0715     Blood Culture, Routine No Growth to date    Blood culture [[id number]] Collected:  03/18/17 2212    Order Status:  Completed Specimen:  Blood from Peripheral, Wrist, Right Updated:  03/19/17 0715     Blood Culture, Routine No Growth to date    Culture, Respiratory [[id number]] Collected:  03/13/17 1608    Order Status:  Completed Specimen:  Respiratory from Sputum Updated:  03/16/17 0950     Respiratory Culture Normal respiratory richard     Gram Stain (Respiratory) <10 epithelial cells per low power field.     Gram Stain (Respiratory) Rare WBC's     Gram Stain (Respiratory) Few Gram positive cocci     Gram Stain (Respiratory) Rare budding yeast     Gram Stain (Respiratory) Rare Gram negative rods              ASSESSMENT/PLAN:     1. Malignant LMCA ischemic stroke  2. Cerebral edema  3. Subfalcine herniation  4. Aphasia   5. AF  6. Sepsis   7. LUE DVT    A/P:  - Neurological exam is unchanged   - Currently on RA  - Continue asa  - Heart rate is under good control, continue current regimen   - BP is under better control after increasing lisinopril dose   - She had a fever and leukocytosis, no clear source of infection, will pancx and place on broad spectrum abx and re evaluate after the culture results   - Will hold on anticoagulation for the LUE DVT giving the lower risk of PE from UE DVTs in general and the high risk of bleeding  in the settings of malignant MCA stroke. Discussed that with family   - S/p PEG and TF at goal  - H    Uninterrupted Critical Care/Counseling Time (not including procedures):  35 minutes

## 2017-03-19 NOTE — PLAN OF CARE
Problem: Patient Care Overview  Goal: Plan of Care Review  Outcome: Ongoing (interventions implemented as appropriate)  No acute events throughout the day, VS and assessment per flow sheet, patient progressing towards goal as tolerated. Plan of care reviewed with Zena Aguirre and family, all concerns addressed. Will continue to monitor.      Pt given laxative medication, has had two BMs since administration.  Pt had period of HR >140 for a period >5min.  JOSE Holt notified, see chart for orders.  Pt required administration of PRN BP meds to maintain a SBP <160.  Will continue to monitor the pt.

## 2017-03-19 NOTE — PROGRESS NOTES
Pt hadn't voided since removal of sesay cath 6 hours prior, bladder scanned and read as >650ml.  MD Caty informed, ordered to straight cath pt and if she hasn't voided 2hrs after being straight cathed, then place a sesay cath.  Will continue to monitor the pt.

## 2017-03-19 NOTE — PROGRESS NOTES
Per the night nurse, the pt required straight cath due to urinary retention.  I scanned the pt this morning, and she was retaining >500ml.  Since was the second time she required a straight cath, I placed a sesay catheter and drained 550ml at once.  Team was notified during rounding.

## 2017-03-20 PROBLEM — R13.11 ORAL PHASE DYSPHAGIA: Status: ACTIVE | Noted: 2017-03-20

## 2017-03-20 LAB
ALBUMIN SERPL BCP-MCNC: 2.2 G/DL
ALP SERPL-CCNC: 59 U/L
ALT SERPL W/O P-5'-P-CCNC: 52 U/L
ANION GAP SERPL CALC-SCNC: 8 MMOL/L
ANISOCYTOSIS BLD QL SMEAR: SLIGHT
AST SERPL-CCNC: 80 U/L
BACTERIA UR CULT: NO GROWTH
BASOPHILS # BLD AUTO: 0.02 K/UL
BASOPHILS NFR BLD: 0.1 %
BILIRUB SERPL-MCNC: 0.6 MG/DL
BUN SERPL-MCNC: 22 MG/DL
CALCIUM SERPL-MCNC: 8.9 MG/DL
CHLORIDE SERPL-SCNC: 105 MMOL/L
CO2 SERPL-SCNC: 26 MMOL/L
CREAT SERPL-MCNC: 0.7 MG/DL
DIFFERENTIAL METHOD: ABNORMAL
EOSINOPHIL # BLD AUTO: 0.2 K/UL
EOSINOPHIL NFR BLD: 0.9 %
ERYTHROCYTE [DISTWIDTH] IN BLOOD BY AUTOMATED COUNT: 13.7 %
EST. GFR  (AFRICAN AMERICAN): >60 ML/MIN/1.73 M^2
EST. GFR  (NON AFRICAN AMERICAN): >60 ML/MIN/1.73 M^2
GLUCOSE SERPL-MCNC: 127 MG/DL
HCT VFR BLD AUTO: 30.7 %
HGB BLD-MCNC: 10.5 G/DL
LYMPHOCYTES # BLD AUTO: 1.6 K/UL
LYMPHOCYTES NFR BLD: 9.3 %
MAGNESIUM SERPL-MCNC: 1.7 MG/DL
MCH RBC QN AUTO: 33 PG
MCHC RBC AUTO-ENTMCNC: 34.2 %
MCV RBC AUTO: 97 FL
MONOCYTES # BLD AUTO: 1.4 K/UL
MONOCYTES NFR BLD: 8 %
NEUTROPHILS # BLD AUTO: 13.7 K/UL
NEUTROPHILS NFR BLD: 81.7 %
PHOSPHATE SERPL-MCNC: 2.9 MG/DL
PLATELET # BLD AUTO: 336 K/UL
PLATELET BLD QL SMEAR: ABNORMAL
PMV BLD AUTO: 9.6 FL
POCT GLUCOSE: 127 MG/DL (ref 70–110)
POCT GLUCOSE: 129 MG/DL (ref 70–110)
POCT GLUCOSE: 141 MG/DL (ref 70–110)
POLYCHROMASIA BLD QL SMEAR: ABNORMAL
POTASSIUM SERPL-SCNC: 3.7 MMOL/L
PROT SERPL-MCNC: 6.3 G/DL
RBC # BLD AUTO: 3.18 M/UL
SODIUM SERPL-SCNC: 139 MMOL/L
WBC # BLD AUTO: 16.96 K/UL

## 2017-03-20 PROCEDURE — 25000003 PHARM REV CODE 250: Performed by: PHYSICIAN ASSISTANT

## 2017-03-20 PROCEDURE — 92507 TX SP LANG VOICE COMM INDIV: CPT

## 2017-03-20 PROCEDURE — 63600175 PHARM REV CODE 636 W HCPCS: Performed by: PHYSICIAN ASSISTANT

## 2017-03-20 PROCEDURE — 63600175 PHARM REV CODE 636 W HCPCS: Performed by: PSYCHIATRY & NEUROLOGY

## 2017-03-20 PROCEDURE — 86580 TB INTRADERMAL TEST: CPT | Performed by: PSYCHIATRY & NEUROLOGY

## 2017-03-20 PROCEDURE — 85025 COMPLETE CBC W/AUTO DIFF WBC: CPT

## 2017-03-20 PROCEDURE — 25000003 PHARM REV CODE 250: Performed by: STUDENT IN AN ORGANIZED HEALTH CARE EDUCATION/TRAINING PROGRAM

## 2017-03-20 PROCEDURE — 94668 MNPJ CHEST WALL SBSQ: CPT

## 2017-03-20 PROCEDURE — 84100 ASSAY OF PHOSPHORUS: CPT

## 2017-03-20 PROCEDURE — 99233 SBSQ HOSP IP/OBS HIGH 50: CPT | Mod: ,,, | Performed by: PSYCHIATRY & NEUROLOGY

## 2017-03-20 PROCEDURE — 83735 ASSAY OF MAGNESIUM: CPT

## 2017-03-20 PROCEDURE — 80053 COMPREHEN METABOLIC PANEL: CPT

## 2017-03-20 PROCEDURE — 97110 THERAPEUTIC EXERCISES: CPT

## 2017-03-20 PROCEDURE — 25000242 PHARM REV CODE 250 ALT 637 W/ HCPCS: Performed by: PHYSICIAN ASSISTANT

## 2017-03-20 PROCEDURE — 20000000 HC ICU ROOM

## 2017-03-20 PROCEDURE — 25000003 PHARM REV CODE 250: Performed by: PSYCHIATRY & NEUROLOGY

## 2017-03-20 PROCEDURE — 92526 ORAL FUNCTION THERAPY: CPT

## 2017-03-20 PROCEDURE — 94640 AIRWAY INHALATION TREATMENT: CPT

## 2017-03-20 RX ORDER — FUROSEMIDE 40 MG/1
40 TABLET ORAL 2 TIMES DAILY
Status: DISCONTINUED | OUTPATIENT
Start: 2017-03-20 | End: 2017-03-22 | Stop reason: HOSPADM

## 2017-03-20 RX ADMIN — Medication 5 UNITS: at 02:03

## 2017-03-20 RX ADMIN — Medication 3 ML: at 09:03

## 2017-03-20 RX ADMIN — Medication 3 ML: at 05:03

## 2017-03-20 RX ADMIN — POLYETHYLENE GLYCOL 3350 17 G: 17 POWDER, FOR SOLUTION ORAL at 09:03

## 2017-03-20 RX ADMIN — ASPIRIN 81 MG CHEWABLE TABLET 81 MG: 81 TABLET CHEWABLE at 09:03

## 2017-03-20 RX ADMIN — HEPARIN SODIUM 5000 UNITS: 5000 INJECTION, SOLUTION INTRAVENOUS; SUBCUTANEOUS at 09:03

## 2017-03-20 RX ADMIN — LEVOTHYROXINE SODIUM 25 MCG: 25 TABLET ORAL at 05:03

## 2017-03-20 RX ADMIN — DILTIAZEM HYDROCHLORIDE 90 MG: 60 TABLET, FILM COATED ORAL at 11:03

## 2017-03-20 RX ADMIN — CALCIUM 500 MG: 500 TABLET ORAL at 08:03

## 2017-03-20 RX ADMIN — Medication 10 ML: at 05:03

## 2017-03-20 RX ADMIN — STANDARDIZED SENNA CONCENTRATE AND DOCUSATE SODIUM 1 TABLET: 8.6; 5 TABLET, FILM COATED ORAL at 09:03

## 2017-03-20 RX ADMIN — DILTIAZEM HYDROCHLORIDE 60 MG: 60 TABLET, FILM COATED ORAL at 12:03

## 2017-03-20 RX ADMIN — IPRATROPIUM BROMIDE AND ALBUTEROL SULFATE 3 ML: .5; 3 SOLUTION RESPIRATORY (INHALATION) at 03:03

## 2017-03-20 RX ADMIN — LISINOPRIL 20 MG: 20 TABLET ORAL at 09:03

## 2017-03-20 RX ADMIN — DILTIAZEM HYDROCHLORIDE 90 MG: 60 TABLET, FILM COATED ORAL at 06:03

## 2017-03-20 RX ADMIN — HEPARIN SODIUM 5000 UNITS: 5000 INJECTION, SOLUTION INTRAVENOUS; SUBCUTANEOUS at 02:03

## 2017-03-20 RX ADMIN — DILTIAZEM HYDROCHLORIDE 60 MG: 60 TABLET, FILM COATED ORAL at 05:03

## 2017-03-20 RX ADMIN — HYDRALAZINE HYDROCHLORIDE 10 MG: 20 INJECTION INTRAMUSCULAR; INTRAVENOUS at 03:03

## 2017-03-20 RX ADMIN — POTASSIUM CHLORIDE 40 MEQ: 400 INJECTION, SOLUTION INTRAVENOUS at 06:03

## 2017-03-20 RX ADMIN — ATORVASTATIN CALCIUM 40 MG: 20 TABLET, FILM COATED ORAL at 09:03

## 2017-03-20 RX ADMIN — CALCIUM 500 MG: 500 TABLET ORAL at 06:03

## 2017-03-20 RX ADMIN — HEPARIN SODIUM 5000 UNITS: 5000 INJECTION, SOLUTION INTRAVENOUS; SUBCUTANEOUS at 05:03

## 2017-03-20 RX ADMIN — Medication 1000 MG: at 11:03

## 2017-03-20 RX ADMIN — Medication 10 ML: at 12:03

## 2017-03-20 RX ADMIN — CEFEPIME HYDROCHLORIDE 1 G: 1 INJECTION, POWDER, FOR SOLUTION INTRAMUSCULAR; INTRAVENOUS at 09:03

## 2017-03-20 RX ADMIN — IPRATROPIUM BROMIDE AND ALBUTEROL SULFATE 3 ML: .5; 3 SOLUTION RESPIRATORY (INHALATION) at 07:03

## 2017-03-20 RX ADMIN — VITAMIN D, TAB 1000IU (100/BT) 1000 UNITS: 25 TAB at 09:03

## 2017-03-20 RX ADMIN — FUROSEMIDE 40 MG: 40 TABLET ORAL at 06:03

## 2017-03-20 RX ADMIN — VITAMIN C 1 TABLET: TAB at 09:03

## 2017-03-20 RX ADMIN — MAGNESIUM SULFATE IN WATER 2 G: 40 INJECTION, SOLUTION INTRAVENOUS at 06:03

## 2017-03-20 RX ADMIN — CEFEPIME HYDROCHLORIDE 1 G: 1 INJECTION, POWDER, FOR SOLUTION INTRAMUSCULAR; INTRAVENOUS at 11:03

## 2017-03-20 NOTE — PLAN OF CARE
Problem: SLP Goal  Goal: SLP Goal  Updated Speech Language Pathology Goals  Goals expected to be met by 3/22  1. Pt will participate in ongoing swallowing assessment to determine if safe to begin PO intake.  2. Pt will vocalize x1 during session.  3. Pt will follow simple, one step commands given max cues with 70% acc to improve overall receptive language  4. Pt will answer simple y/n questions in any modality with 70% acc to improve overall receptive language skills  5. Pt will identify objects in FO2 via eye gaze with 60% acc to improve overall receptive language skills      Speech Language Pathology Goals  Goals expected to be met by 3/14:  1. Pt will participate in ongoing swallowing assessment to determine if safe to begin PO intake.  2. Pt will participate in speech/language evaluation to determine further goals.      Outcome: Ongoing (interventions implemented as appropriate)  Pt awake/alert and more aware of PO presentations. Overt coughing/choking present with full tsp sip of thin water.  Vocal attempts, but no intelligible verbalizations elicited.  Cont POC. SAMINA Guzman, CCC-SLP  Speech Language Pathologist  [phone]  3/20/2017

## 2017-03-20 NOTE — PLAN OF CARE
Order for TB test per NCC Team. Notified NCC Team TB test done 3/13/17. Test read was not completed. Will complete order when sent from pharm. Will place nursing communication to read test after 48-72 hours.

## 2017-03-20 NOTE — PLAN OF CARE
03/20/17 1608   Right Care Assessment   Can the patient answer the patient profile reliably? No, cognitively impaired   How often would a person be available to care for the patient? Whenever needed   Describe the patient's ability to walk at the present time. Does not walk or unable to take any steps at all   How does the patient rate their overall health at the present time? (anni)   Number of comorbid conditions (as recorded on the chart) Two   During the past month, has the patient often been bothered by feeling down, depressed or hopeless? (anni)   During the past month, has the patient often been bothered by little interest or pleasure in doing things? (anni)       Plan A:  SNF  [facility] ([phone]).   Plan B:  retirement Facility    Hortencia Gates RN, CCRN-K, [facility]  Neuro-Critical Care   X [phone]

## 2017-03-20 NOTE — PLAN OF CARE
Orders to remove PICC placed by NCC Team. US completed on 3/19/17 reviewed. DVT noted LUE. NCC Team called PICC removed by MD John at bedside. Will continue to monitor.  Elana Casillas RN

## 2017-03-20 NOTE — PT/OT/SLP PROGRESS
Occupational Therapy  Treatment    Zena Aguirre   MRN: [id number]   Admitting Diagnosis: Embolic stroke involving left middle cerebral artery    OT Date of Treatment: 17   OT Start Time: 1213  OT Stop Time: 1236  OT Total Time (min): 23 min    Billable Minutes:  Therapeutic Exercise 23    General Precautions: Standard, aspiration, fall, NPO (cardiac, DNR)    Do you have any cultural, spiritual, Mu-ism conflicts, given your current situation?: Alevism    Subjective:  Communicated with RN prior to session.  Pt with no attempts to verbally  Interact during session.    Pain Ratin/10  Pain Addressed: Reposition, Distraction, Cessation of Activity, Nurse notified  Pain Rating Post-Intervention:  (Pt appeared to have pain during ROM to RUE however at rest no pain, pt unable to indicate level of pain)    Objective:  Patient found with: blood pressure cuff, sesay catheter, pulse ox (continuous), telemetry, SCD, peripheral IV (PEG tube)     Therapeutic Activities and Exercises:  Pt performed AAROM with LUE & LLE & PROM with RUE & RLE in all planes x 10 reps each while supine. Provided gentle stretch at end ranges due to increased tone.  Pt with noted pain response to ROM of RUE (pt with large amounts of bruising to underside of RUE forearm).  Pt with eyes open 100% of session. Pt followed no commands during session.  Pt with noted pursed lip breathing at times during session (family & RN aware).  Provided education to pt's family on POC.  Pt's family had no further questions & when asked whether there were any concerns pt's family reported none.      AM-PAC 6 CLICK ADL   How much help from another person does this patient currently need?   1 = Unable, Total/Dependent Assistance  2 = A lot, Maximum/Moderate Assistance  3 = A little, Minimum/Contact Guard/Supervision  4 = None, Modified [place]/Independent    Putting on and taking off regular lower body clothing? : 1  Bathing (including washing, rinsing,  drying)?: 1  Toileting, which includes using toilet, bedpan, or urinal? : 1  Putting on and taking off regular upper body clothing?: 1  Taking care of personal grooming such as brushing teeth?: 1  Eating meals?: 1  Total Score: 6     AM-PAC Raw Score CMS G-Code Modifier Level of Impairment Assistance   6 % Total / Unable   7 - 9 CM 80 - 100% Maximal Assist   10 - 14 CL 60 - 80% Moderate Assist   15 - 19 CK 40 - 60% Moderate Assist   20 - 22 CJ 20 - 40% Minimal Assist   23 CI 1-20% SBA / CGA   24 CH 0% Independent/ Mod I     Patient left supine with all lines intact, call button in reach, RN notified, spouse, daughter & son present and white board updated.    ASSESSMENT:  Zena Aguirre is a 81 y.o. female with a medical diagnosis of Embolic stroke involving left middle cerebral artery and presents with limited active participation in activities. Pt continues to require (A) with all activities.  Continue to have limited session to in bed activities due to MD restrictions still present.    Rehab identified problem list/impairments: Rehab identified problem list/impairments: weakness, impaired endurance, impaired sensation, impaired self care skills, visual deficits, impaired balance, impaired functional mobilty, impaired cognition, decreased safety awareness, decreased coordination, decreased upper extremity function, decreased lower extremity function    Rehab potential is fair.    Activity tolerance: Fair    Discharge recommendations: Discharge Facility/Level Of Care Needs: nursing facility, skilled     GOALS:   Occupational Therapy Goals        Problem: Occupational Therapy Goal    Goal Priority Disciplines Outcome Interventions   Occupational Therapy Goal     OT, PT/OT Ongoing (interventions implemented as appropriate)    Description:  Goals set 3/18 to be addressed for 14 days with expiration date, 4/1:  Patient will increase functional independence with ADLs by performing:    Patient will demonstrate  rolling to the right with min assist.  Not met   Patient will demonstrate rolling to the left with mod assist.   Not met  Patient will demonstrate supine -sit with mod assist.   Not met  Patient will demonstrate stand pivot transfers with mod assist.   Not met  Patient will demonstrate grooming while standing with mod assist.   Not met  Patient will demonstrate upper body dressing with mod assist while seated EOB.   Not met  Patient will demonstrate lower body dressing with max assist while seated EOB.   Not met  Patient will demonstrate toileting with max assist.   Not met  Patient will demonstrate bathing while seated EOB with max assist.   Not met  Patient will demonstrate ability to follow 3/4 commands.   Not met  atient's family / caregiver will demonstrate independence and safety with assisting patient with self-care skills and functional mobility.     Not met  Patient's family / caregiver will demonstrate independence with providing ROM and changes in bed positioning.   Not met  Patient and/or patient's family will verbalize understanding of stroke prevention guidelines, personal risk factors and stroke warning signs via teachback method.  Not met                       Plan:  Patient to be seen 5 x/week to address the above listed problems via self-care/home management, neuromuscular re-education, cognitive retraining, sensory integration, therapeutic activities, therapeutic exercises  Plan of Care expires: 04/06/17  Plan of Care reviewed with: patient, spouse, daughter, son         Ann VERONA Bolden  03/20/2017

## 2017-03-20 NOTE — PLAN OF CARE
Problem: Occupational Therapy Goal  Goal: Occupational Therapy Goal  Goals set 3/18 to be addressed for 14 days with expiration date, 4/1:  Patient will increase functional independence with ADLs by performing:    Patient will demonstrate rolling to the right with min assist. Not met   Patient will demonstrate rolling to the left with mod assist. Not met  Patient will demonstrate supine -sit with mod assist. Not met  Patient will demonstrate stand pivot transfers with mod assist. Not met  Patient will demonstrate grooming while standing with mod assist. Not met  Patient will demonstrate upper body dressing with mod assist while seated EOB. Not met  Patient will demonstrate lower body dressing with max assist while seated EOB. Not met  Patient will demonstrate toileting with max assist. Not met  Patient will demonstrate bathing while seated EOB with max assist. Not met  Patient will demonstrate ability to follow 3/4 commands. Not met  atients family / caregiver will demonstrate independence and safety with assisting patient with self-care skills and functional mobility. Not met  Patients family / caregiver will demonstrate independence with providing ROM and changes in bed positioning. Not met  Patient and/or patients family will verbalize understanding of stroke prevention guidelines, personal risk factors and stroke warning signs via teachback method. Not met        Outcome: Ongoing (interventions implemented as appropriate)  Goals remain appropriate

## 2017-03-20 NOTE — PT/OT/SLP PROGRESS
"Speech Language Pathology  Treatment    Zena Aguirre   MRN: [id number]   Admitting Diagnosis: Embolic stroke involving left middle cerebral artery    Diet recommendations: Solid Diet Level: NPO  Liquid Diet Level: NPO Cont PEG tube as primary source of nutrition/hydration/medication at this time.    SLP Treatment Date: 03/20/17  Speech Start Time: 0839     Speech Stop Time: 0858     Speech Total (min): 19 min       TREATMENT BILLABLE MINUTES:  Speech Therapy Individual 8 and Treatment Swallowing Dysfunction 11    Has the patient been evaluated by SLP for swallowing? : Yes  Keep patient NPO?: Yes   General Precautions: Standard, aphasia, aspiration, fall, NPO  Current Respiratory Status: other (comment) (room air)       Subjective:  Pt vocalizing, but not producing any intelligible verbalizations.    "If she sees a cup of water, she's gonna grab it." family members stated                        Objective:       Pt with fluctuating ability to sufficiently strip PO trials from spoon, but improved overall from previous sessions.  Pt observed with the following PO trials: ice chip x 1, 1/3 tsp thin water x 1, 1/2 tsp x 3, and full tsp x 1.  Pt exhibited delayed cough with 1 out of 3 1/2 tsp trials.  Overt coughing/choking episode present with full tsp trial of thin water.  Pt required extended time to fully recovery from coughing/choking.  No further PO trials given due to risk of aspiration.  Pt vocalizing and appearing to attempt to verbalize spontaneously and during automatic counting and singing tasks, but no intelligible utterances produced.  Pt did not follow any commands nor did she respond to simple yes/no q's.  Education provided to pt and family regarding ongoing ST POC. Family demonstrated understanding, but pt unable to 2/2 aphasia.    Assessment:  Zena Aguirre is a 81 y.o. female with a medical diagnosis of Embolic stroke involving left middle cerebral artery and presents with aphasia, apraxia, dysarthria, and " dysphagia.     Discharge recommendations: Discharge Facility/Level Of Care Needs: nursing facility, skilled     Goals:   SLP Goals        Problem: SLP Goal    Goal Priority Disciplines Outcome   SLP Goal     SLP Ongoing (interventions implemented as appropriate)   Description:  Updated Speech Language Pathology Goals  Goals expected to be met by 3/22  1. Pt will participate in ongoing swallowing assessment to determine if safe to begin PO intake.  2. Pt will vocalize x1 during session.  3. Pt will follow simple, one step commands given max cues with 70% acc to improve overall receptive language  4. Pt will answer simple y/n questions in any modality with 70% acc to improve overall receptive language skills  5. Pt will identify objects in FO2 via eye gaze with 60% acc to improve overall receptive language skills      Speech Language Pathology Goals  Goals expected to be met by 3/14:  1. Pt will participate in ongoing swallowing assessment to determine if safe to begin PO intake.  2. Pt will participate in speech/language evaluation to determine further goals.                     Plan:   Patient to be seen Therapy Frequency: 5 x/week   Plan of Care expires: 04/05/17  Plan of Care reviewed with: patient, spouse, daughter  SLP Follow-up?: Yes              SAMINA Guzman, CCC-SLP  03/20/2017     SAMINA Guzman, CCC-SLP  Speech Language Pathologist  [phone]  3/20/2017

## 2017-03-20 NOTE — PROGRESS NOTES
ICU Progress Note  Neurocritical Care    Admit Date: 3/7/2017  LOS: 13    CC: Embolic stroke involving left middle cerebral artery    SUBJECTIVE:     Interval History/Significant Events: Pt febrile with leukocytosis yesterday.  Pt was pancultured, awaiting results.  Continue broad spectrum antibiotics.  Left axillary and left brachial partially occlusive DVTs.     Review of Symptoms: anni, pt aphasic   Constitutional: Denies fevers or chills.  Pulmonary: Denies shortness of breath or cough.  Cardiology: Denies chest pain or palpitations.  GI: Denies abdominal pain or constipation.  Neurologic: Denies new weakness, headaches, or paresthesias.     Medications:  Continuous Infusions:     Scheduled Meds:   albuterol-ipratropium 2.5mg-0.5mg/3mL  3 mL Nebulization Q8H    aspirin  81 mg Per NG tube Daily    atorvastatin  40 mg Per NG tube Daily    B-complex with vitamin C  1 tablet Per NG tube Daily    calcium carbonate  500 mg Per NG tube BID WM    ceFEPime (MAXIPIME) IVPB  1 g Intravenous Q12H    diltiaZEM  90 mg Per NG tube Q6H    furosemide  40 mg Per G Tube BID    heparin (porcine)  5,000 Units Subcutaneous Q8H    levothyroxine  25 mcg Per NG tube Before breakfast    lisinopril  20 mg Per NG tube BID    metoprolol  5 mg Intravenous Once    polyethylene glycol  17 g Oral Daily    senna-docusate 8.6-50 mg  1 tablet Per NG tube BID    sodium chloride 0.9%  10 mL Intravenous Q6H    sodium chloride 0.9%  3 mL Intravenous Q8H    sodium chloride 3%  250 mL Intravenous Once    tuberculin  5 Units Intradermal Once    vancomycin (VANCOCIN) IVPB  15 mg/kg (Dosing Weight) Intravenous Q24H    vitamin D  1,000 Units Per NG tube Daily     PRN Meds:.acetaminophen, dextrose 50%, glucagon (human recombinant), hydrALAZINE, flu vacc [id number] 65yr up(PF), insulin aspart, magnesium sulfate IVPB, magnesium sulfate IVPB, midazolam (PF), pneumoc 13-evette conj-dip cr(PF), rocuronium, Flushing PICC Protocol **AND** sodium  chloride 0.9% **AND** sodium chloride 0.9%, sodium phosphate IVPB, sodium phosphate IVPB, sodium phosphate IVPB    OBJECTIVE:     I & O (24h):    Intake/Output Summary (Last 24 hours) at 03/20/17 1412  Last data filed at 03/20/17 0958   Gross per 24 hour   Intake             1150 ml   Output             2690 ml   Net            -1540 ml       Physical Exam:  Last Vitals:  Vitals:    03/20/17 1200   BP: (!) 160/70   Pulse: 87   Resp: 18   Temp:      Vital Signs (24h Range):   Temp:  [97.9 °F (36.6 °C)-98.1 °F (36.7 °C)] 97.9 °F (36.6 °C)  Pulse:  [] 87  Resp:  [10-25] 18  SpO2:  [96 %-100 %] 96 %  BP: (115-194)/(55-89) 160/70  GA: More alert on PE, no acute distress.   HEENT: No scleral icterus or JVD.   Pulmonary: Clear to auscultation A/P/L. No wheezing, crackles, or rhonchi.  Cardiac: RRR S1 & S2 w/o rubs/murmurs/gallops.   Abdominal: Bowel sounds present x 4. No appreciable hepatosplenomegaly.  Skin: No jaundice, rashes, or visible lesions.  Neuro:  --GCS: E4 V1 M6  --Mental Status:  Expressive aphasia.  Follows simple commands intermittently.   --CN II-XII grossly intact.   --Pupils 3mm, PERRL.   --Corneal reflex, gag, cough intact.  --moves left extremities spontaneously, moves RLE spontaneously   --withdraws to pain in RUE    Invasive Lines:  --Central Line:        PICC Double Lumen 03/08/17 1208 left brachial (Active)   Site Assessment Clean;Dry;Intact;No redness;No swelling 3/13/2017  7:01 AM   Lumen 1 Status Flushed;Blood return noted;Infusing 3/13/2017  7:01 AM   Lumen 2 Status Infusing 3/13/2017  7:01 AM   Length verónica (cm) 36 cm 3/8/2017 12:05 PM   Current Exposed Catheter (cm) 0 cm 3/8/2017 12:05 PM   Extremity Circumference (cm) 25 cm 3/8/2017 12:05 PM   Dressing Type Transparent 3/13/2017  7:01 AM   Dressing Status Biopatch in place;Clean;Dry;Intact 3/13/2017  7:01 AM   Dressing Intervention Dressing reinforced 3/13/2017  7:01 AM   Dressing Change Due 03/15/17 3/13/2017  7:01 AM   Daily Line  "Review Performed 3/13/2017  7:01 AM         --Surgical Drains/Mace:        NG/OG Tube 03/08/17 0615 [place] sump 14 Fr. Right nostril (Active)   Placement Check placement verified by distal tube length measurement;placement verified by x-ray 3/13/2017  7:01 AM   Distal Tube Length (cm) 63 3/10/2017  3:05 AM   Tolerance no signs/symptoms of discomfort 3/13/2017  7:01 AM   Securement anchored to nostril center w/ adhesive device 3/13/2017  7:01 AM   Clamp Status/Tolerance unclamped;no abdominal discomfort;no abdominal distention;no emesis;no nausea;no residual;no restlessness 3/13/2017  7:01 AM   Insertion Site Appearance no redness, warmth, tenderness, skin breakdown, drainage 3/13/2017  7:01 AM   Flush/Irrigation flushed w/;water;no resistance met 3/13/2017  7:01 AM   Feeding Method continuous 3/13/2017  7:01 AM   Current Rate (mL/hr) 40 mL/hr 3/13/2017  7:01 AM   Goal Rate (mL/hr) 40 mL/hr 3/13/2017  7:01 AM   Intake (mL) 150 mL 3/13/2017  8:00 AM   Intake (mL) - Formula Tube Feeding 40 3/13/2017 12:00 PM   Residual Amount (ml) 10 ml 3/12/2017  7:01 AM            Urethral Catheter 03/12/17 1300 (Active)   Site Assessment Clean;Intact 3/13/2017  7:01 AM   Collection Container Urimeter 3/13/2017  7:01 AM   Securement Method secured to top of thigh w/ adhesive device 3/13/2017  7:01 AM   Catheter Care Performed yes 3/13/2017  7:01 AM   Reason for Continuing Urinary Catheterization Critically ill in ICU requiring intensive monitoring 3/13/2017  7:01 AM   CAUTI Prevention Bundle StatLock in place w 1" slack;Intact seal between catheter & drainage tubing;Drainage bag off the floor;Green sheeting clip in use;No dependent loops or kinks;Drainage bag not overfilled (<2/3 full);Drainage bag below bladder 3/13/2017  7:01 AM   Output (mL) 450 mL 3/13/2017 12:00 PM       Nutrition: TF via PEG     Labs:  ABG:   No results for input(s): PH, PO2, PCO2, HCO3, POCSATURATED, BE in the last 24 hours.    BMP:    Recent Labs  Lab " 03/20/17  0304      K 3.7      CO2 26   BUN 22   CREATININE 0.7   *   MG 1.7   PHOS 2.9       LFT:   Lab Results   Component Value Date    AST 80 (H) 03/20/2017    ALT 52 (H) 03/20/2017    ALKPHOS 59 03/20/2017    BILITOT 0.6 03/20/2017    ALBUMIN 2.2 (L) 03/20/2017    PROT 6.3 03/20/2017       CBC:   Lab Results   Component Value Date    WBC 16.96 (H) 03/20/2017    HGB 10.5 (L) 03/20/2017    HCT 30.7 (L) 03/20/2017    MCV 97 03/20/2017     03/20/2017       Microbiology x 7d:   Microbiology Results (last 7 days)     Procedure Component Value Units Date/Time    Culture, Respiratory [[id number]] Collected:  03/19/17 1108    Order Status:  Completed Specimen:  Respiratory from Sputum Updated:  03/20/17 1219     Respiratory Culture Normal respiratory richard     Gram Stain (Respiratory) Few WBC's     Gram Stain (Respiratory) Rare Gram positive cocci     Gram Stain (Respiratory) Rare Gram positive rods     Gram Stain (Respiratory) Rare yeast    Blood culture [[id number]] Collected:  03/18/17 2211    Order Status:  Completed Specimen:  Blood from Peripheral, Hand, Right Updated:  03/20/17 0612     Blood Culture, Routine No Growth to date     Blood Culture, Routine No Growth to date    Blood culture [[id number]] Collected:  03/18/17 2212    Order Status:  Completed Specimen:  Blood from Peripheral, Wrist, Right Updated:  03/20/17 0612     Blood Culture, Routine No Growth to date     Blood Culture, Routine No Growth to date    Urine culture [[id number]] Collected:  03/19/17 1030    Order Status:  Sent Specimen:  Urine from Urine, Catheterized Updated:  03/19/17 1152    Culture, Respiratory [[id number]] Collected:  03/13/17 1608    Order Status:  Completed Specimen:  Respiratory from Sputum Updated:  03/16/17 0950     Respiratory Culture Normal respiratory richard     Gram Stain (Respiratory) <10 epithelial cells per low power field.     Gram Stain (Respiratory) Rare WBC's     Gram Stain (Respiratory) Few  Gram positive cocci     Gram Stain (Respiratory) Rare budding yeast     Gram Stain (Respiratory) Rare Gram negative rods          Imaging:  I have personally reviewed.     ASSESSMENT/PLAN:     Patient Active Problem List   Diagnosis    Hypothyroidism    Essential hypertension    Paroxysmal atrial fibrillation    Hyperlipidemia    Embolic stroke involving left middle cerebral artery    Cytotoxic cerebral edema    Right flaccid hemiplegia    Pleural effusion, bilateral    Pulmonary hypertension    Brain compression    Oral phase dysphagia       Neuro:  L MCA/GIRMA infarct  --continue atorvastatin, asa   --neuro exam stable  --PT/OT/ SLP    Pulmonary:  --NC prn    Cardiac:  Atrial fibrillation  --dilatiazem 60 mg qid    HTN  ---160 in setting of hemorrhagic conversion   --lisinopril 20 mg daily     Renal:   --BUN, creat stable  --24 hour net I/O: -500 cc    ID:  --WBC 16  --afebrile overnight  --pancultured 3/19  --continue broad spectrum antibiotics while awaiting culture results    Hem/Onc:  --H/H decreased but stable  --platelets 336    Endocrine:  --A1C 5.9  --TSH 0.109, free T4 1.23  --continue levothyroxine 25 mcg daily     Fluids/Electrolytes/Nutrition/GI:  --TF via PEG     Level III    Lissa Renner PA-C  Neuro Critical Care  [id number]

## 2017-03-21 PROBLEM — I63.512 ACUTE ISCHEMIC LEFT MIDDLE CEREBRAL ARTERY (MCA) STROKE: Status: ACTIVE | Noted: 2017-03-21

## 2017-03-21 LAB
ALBUMIN SERPL BCP-MCNC: 2 G/DL
ALP SERPL-CCNC: 55 U/L
ALT SERPL W/O P-5'-P-CCNC: 44 U/L
ANION GAP SERPL CALC-SCNC: 8 MMOL/L
AST SERPL-CCNC: 63 U/L
BACTERIA SPEC AEROBE CULT: NORMAL
BASOPHILS # BLD AUTO: 0.02 K/UL
BASOPHILS NFR BLD: 0.1 %
BILIRUB SERPL-MCNC: 0.6 MG/DL
BUN SERPL-MCNC: 23 MG/DL
CALCIUM SERPL-MCNC: 8.6 MG/DL
CHLORIDE SERPL-SCNC: 100 MMOL/L
CO2 SERPL-SCNC: 27 MMOL/L
CREAT SERPL-MCNC: 0.7 MG/DL
DIFFERENTIAL METHOD: ABNORMAL
EOSINOPHIL # BLD AUTO: 0.2 K/UL
EOSINOPHIL NFR BLD: 1.3 %
ERYTHROCYTE [DISTWIDTH] IN BLOOD BY AUTOMATED COUNT: 13.8 %
EST. GFR  (AFRICAN AMERICAN): >60 ML/MIN/1.73 M^2
EST. GFR  (NON AFRICAN AMERICAN): >60 ML/MIN/1.73 M^2
GLUCOSE SERPL-MCNC: 122 MG/DL
GRAM STN SPEC: NORMAL
HCT VFR BLD AUTO: 27.3 %
HGB BLD-MCNC: 9.2 G/DL
LYMPHOCYTES # BLD AUTO: 2.3 K/UL
LYMPHOCYTES NFR BLD: 14.4 %
MAGNESIUM SERPL-MCNC: 1.7 MG/DL
MAGNESIUM SERPL-MCNC: 2.4 MG/DL
MCH RBC QN AUTO: 32.2 PG
MCHC RBC AUTO-ENTMCNC: 33.7 %
MCV RBC AUTO: 96 FL
MONOCYTES # BLD AUTO: 1.4 K/UL
MONOCYTES NFR BLD: 8.6 %
NEUTROPHILS # BLD AUTO: 11.9 K/UL
NEUTROPHILS NFR BLD: 74.8 %
PHOSPHATE SERPL-MCNC: 3.5 MG/DL
PLATELET # BLD AUTO: 318 K/UL
PMV BLD AUTO: 9.6 FL
POCT GLUCOSE: 105 MG/DL (ref 70–110)
POCT GLUCOSE: 116 MG/DL (ref 70–110)
POCT GLUCOSE: 158 MG/DL (ref 70–110)
POTASSIUM SERPL-SCNC: 3.6 MMOL/L
POTASSIUM SERPL-SCNC: 4.2 MMOL/L
PROT SERPL-MCNC: 6 G/DL
RBC # BLD AUTO: 2.86 M/UL
SODIUM SERPL-SCNC: 135 MMOL/L
VANCOMYCIN TROUGH SERPL-MCNC: 9 UG/ML
WBC # BLD AUTO: 15.85 K/UL

## 2017-03-21 PROCEDURE — 94668 MNPJ CHEST WALL SBSQ: CPT

## 2017-03-21 PROCEDURE — 99232 SBSQ HOSP IP/OBS MODERATE 35: CPT | Mod: ,,, | Performed by: PSYCHIATRY & NEUROLOGY

## 2017-03-21 PROCEDURE — 84132 ASSAY OF SERUM POTASSIUM: CPT

## 2017-03-21 PROCEDURE — 80053 COMPREHEN METABOLIC PANEL: CPT

## 2017-03-21 PROCEDURE — 97110 THERAPEUTIC EXERCISES: CPT

## 2017-03-21 PROCEDURE — 25000003 PHARM REV CODE 250: Performed by: PSYCHIATRY & NEUROLOGY

## 2017-03-21 PROCEDURE — 25000003 PHARM REV CODE 250: Performed by: PHYSICIAN ASSISTANT

## 2017-03-21 PROCEDURE — 80202 ASSAY OF VANCOMYCIN: CPT

## 2017-03-21 PROCEDURE — 83735 ASSAY OF MAGNESIUM: CPT | Mod: 91

## 2017-03-21 PROCEDURE — 84100 ASSAY OF PHOSPHORUS: CPT

## 2017-03-21 PROCEDURE — 92507 TX SP LANG VOICE COMM INDIV: CPT

## 2017-03-21 PROCEDURE — 97530 THERAPEUTIC ACTIVITIES: CPT

## 2017-03-21 PROCEDURE — 63600175 PHARM REV CODE 636 W HCPCS: Performed by: PSYCHIATRY & NEUROLOGY

## 2017-03-21 PROCEDURE — 99233 SBSQ HOSP IP/OBS HIGH 50: CPT | Mod: ,,, | Performed by: PSYCHIATRY & NEUROLOGY

## 2017-03-21 PROCEDURE — 97535 SELF CARE MNGMENT TRAINING: CPT

## 2017-03-21 PROCEDURE — 94640 AIRWAY INHALATION TREATMENT: CPT

## 2017-03-21 PROCEDURE — 25000242 PHARM REV CODE 250 ALT 637 W/ HCPCS: Performed by: PHYSICIAN ASSISTANT

## 2017-03-21 PROCEDURE — 20600001 HC STEP DOWN PRIVATE ROOM

## 2017-03-21 PROCEDURE — 92526 ORAL FUNCTION THERAPY: CPT

## 2017-03-21 PROCEDURE — 63600175 PHARM REV CODE 636 W HCPCS: Performed by: PHYSICIAN ASSISTANT

## 2017-03-21 PROCEDURE — 85025 COMPLETE CBC W/AUTO DIFF WBC: CPT

## 2017-03-21 RX ORDER — NAPROXEN SODIUM 220 MG/1
81 TABLET, FILM COATED ORAL DAILY
Status: DISCONTINUED | OUTPATIENT
Start: 2017-03-22 | End: 2017-03-22 | Stop reason: HOSPADM

## 2017-03-21 RX ORDER — ACETAMINOPHEN 325 MG/1
650 TABLET ORAL EVERY 6 HOURS PRN
Status: DISCONTINUED | OUTPATIENT
Start: 2017-03-21 | End: 2017-03-22 | Stop reason: HOSPADM

## 2017-03-21 RX ORDER — ATORVASTATIN CALCIUM 20 MG/1
40 TABLET, FILM COATED ORAL DAILY
Status: DISCONTINUED | OUTPATIENT
Start: 2017-03-22 | End: 2017-03-22 | Stop reason: HOSPADM

## 2017-03-21 RX ORDER — POLYETHYLENE GLYCOL 3350 17 G/17G
17 POWDER, FOR SOLUTION ORAL DAILY
Status: DISCONTINUED | OUTPATIENT
Start: 2017-03-22 | End: 2017-03-22 | Stop reason: HOSPADM

## 2017-03-21 RX ORDER — POTASSIUM CHLORIDE 20 MEQ/15ML
40 SOLUTION ORAL
Status: DISCONTINUED | OUTPATIENT
Start: 2017-03-21 | End: 2017-03-21

## 2017-03-21 RX ORDER — B-COMPLEX WITH VITAMIN C
1 TABLET ORAL DAILY
Status: DISCONTINUED | OUTPATIENT
Start: 2017-03-22 | End: 2017-03-22 | Stop reason: HOSPADM

## 2017-03-21 RX ORDER — POTASSIUM CHLORIDE 20 MEQ/15ML
60 SOLUTION ORAL
Status: DISCONTINUED | OUTPATIENT
Start: 2017-03-21 | End: 2017-03-21

## 2017-03-21 RX ORDER — CHOLECALCIFEROL (VITAMIN D3) 25 MCG
1000 TABLET ORAL DAILY
Status: DISCONTINUED | OUTPATIENT
Start: 2017-03-22 | End: 2017-03-22 | Stop reason: HOSPADM

## 2017-03-21 RX ORDER — LEVOTHYROXINE SODIUM 25 UG/1
25 TABLET ORAL
Status: DISCONTINUED | OUTPATIENT
Start: 2017-03-22 | End: 2017-03-22 | Stop reason: HOSPADM

## 2017-03-21 RX ORDER — AMOXICILLIN 250 MG
1 CAPSULE ORAL 2 TIMES DAILY
Status: DISCONTINUED | OUTPATIENT
Start: 2017-03-21 | End: 2017-03-22 | Stop reason: HOSPADM

## 2017-03-21 RX ORDER — CALCIUM CARBONATE 500(1250)
500 TABLET ORAL 2 TIMES DAILY WITH MEALS
Status: DISCONTINUED | OUTPATIENT
Start: 2017-03-21 | End: 2017-03-22 | Stop reason: HOSPADM

## 2017-03-21 RX ADMIN — LISINOPRIL 20 MG: 20 TABLET ORAL at 09:03

## 2017-03-21 RX ADMIN — VITAMIN C 1 TABLET: TAB at 08:03

## 2017-03-21 RX ADMIN — CEFEPIME HYDROCHLORIDE 1 G: 1 INJECTION, POWDER, FOR SOLUTION INTRAMUSCULAR; INTRAVENOUS at 09:03

## 2017-03-21 RX ADMIN — CEFEPIME HYDROCHLORIDE 1 G: 1 INJECTION, POWDER, FOR SOLUTION INTRAMUSCULAR; INTRAVENOUS at 10:03

## 2017-03-21 RX ADMIN — CALCIUM 500 MG: 500 TABLET ORAL at 08:03

## 2017-03-21 RX ADMIN — CALCIUM 500 MG: 500 TABLET ORAL at 04:03

## 2017-03-21 RX ADMIN — HEPARIN SODIUM 5000 UNITS: 5000 INJECTION, SOLUTION INTRAVENOUS; SUBCUTANEOUS at 05:03

## 2017-03-21 RX ADMIN — IPRATROPIUM BROMIDE AND ALBUTEROL SULFATE 3 ML: .5; 3 SOLUTION RESPIRATORY (INHALATION) at 12:03

## 2017-03-21 RX ADMIN — DILTIAZEM HYDROCHLORIDE 90 MG: 60 TABLET, FILM COATED ORAL at 06:03

## 2017-03-21 RX ADMIN — IPRATROPIUM BROMIDE AND ALBUTEROL SULFATE 3 ML: .5; 3 SOLUTION RESPIRATORY (INHALATION) at 08:03

## 2017-03-21 RX ADMIN — POLYETHYLENE GLYCOL 3350 17 G: 17 POWDER, FOR SOLUTION ORAL at 08:03

## 2017-03-21 RX ADMIN — HEPARIN SODIUM 5000 UNITS: 5000 INJECTION, SOLUTION INTRAVENOUS; SUBCUTANEOUS at 01:03

## 2017-03-21 RX ADMIN — LISINOPRIL 20 MG: 20 TABLET ORAL at 08:03

## 2017-03-21 RX ADMIN — ACETAMINOPHEN 650 MG: 325 TABLET ORAL at 05:03

## 2017-03-21 RX ADMIN — DILTIAZEM HYDROCHLORIDE 90 MG: 60 TABLET, FILM COATED ORAL at 05:03

## 2017-03-21 RX ADMIN — MAGNESIUM SULFATE IN WATER 2 G: 40 INJECTION, SOLUTION INTRAVENOUS at 05:03

## 2017-03-21 RX ADMIN — IPRATROPIUM BROMIDE AND ALBUTEROL SULFATE 3 ML: .5; 3 SOLUTION RESPIRATORY (INHALATION) at 03:03

## 2017-03-21 RX ADMIN — HEPARIN SODIUM 5000 UNITS: 5000 INJECTION, SOLUTION INTRAVENOUS; SUBCUTANEOUS at 09:03

## 2017-03-21 RX ADMIN — DILTIAZEM HYDROCHLORIDE 90 MG: 60 TABLET, FILM COATED ORAL at 11:03

## 2017-03-21 RX ADMIN — INSULIN ASPART 2 UNITS: 100 INJECTION, SOLUTION INTRAVENOUS; SUBCUTANEOUS at 11:03

## 2017-03-21 RX ADMIN — ASPIRIN 81 MG CHEWABLE TABLET 81 MG: 81 TABLET CHEWABLE at 08:03

## 2017-03-21 RX ADMIN — VITAMIN D, TAB 1000IU (100/BT) 1000 UNITS: 25 TAB at 09:03

## 2017-03-21 RX ADMIN — Medication 10 ML: at 09:03

## 2017-03-21 RX ADMIN — Medication 1000 MG: at 11:03

## 2017-03-21 RX ADMIN — Medication 3 ML: at 05:03

## 2017-03-21 RX ADMIN — FUROSEMIDE 40 MG: 40 TABLET ORAL at 08:03

## 2017-03-21 RX ADMIN — FUROSEMIDE 40 MG: 40 TABLET ORAL at 06:03

## 2017-03-21 RX ADMIN — STANDARDIZED SENNA CONCENTRATE AND DOCUSATE SODIUM 1 TABLET: 8.6; 5 TABLET, FILM COATED ORAL at 09:03

## 2017-03-21 RX ADMIN — ATORVASTATIN CALCIUM 40 MG: 20 TABLET, FILM COATED ORAL at 08:03

## 2017-03-21 RX ADMIN — STANDARDIZED SENNA CONCENTRATE AND DOCUSATE SODIUM 1 TABLET: 8.6; 5 TABLET, FILM COATED ORAL at 08:03

## 2017-03-21 RX ADMIN — Medication 3 ML: at 01:03

## 2017-03-21 RX ADMIN — LEVOTHYROXINE SODIUM 25 MCG: 25 TABLET ORAL at 05:03

## 2017-03-21 RX ADMIN — POTASSIUM CHLORIDE 40 MEQ: 20 SOLUTION ORAL at 05:03

## 2017-03-21 NOTE — NURSING TRANSFER
Nursing Transfer Note      3/21/2017     Transfer To: 742A  Transfer From:[id number]    Transfer via bed    Transfer with cardiac monitoring    Transported by RN and PCT    Medicines sent: Insulin Pen, B complex with Vitamin C tablet    Chart send with patient: Yes    Notified: spouse, son    Patient reassessed at: 3/21/17, 1755    Upon arrival to floor: cardiac monitor applied, patient oriented to room, call bell in reach and bed in lowest position    Notified RAVI Guillory regarding TB skin test to be read tomorrow.

## 2017-03-21 NOTE — PROGRESS NOTES
[facility]  Vascular Neurology  Comprehensive Stroke Center  Progress Note      Neurologic Chief Complaint: L MCA stroke     Subjective:     Interval History: Patient is seen for follow-up neurological assessment and treatment recommendations: DVT in left arm , picc removed. Doing well otherwise, (+) neuro stable     HPI, Past Medical, Family, and Social History remains the same as documented in the initial encounter.     Review of Systems   Constitutional: Negative for fever.   Gastrointestinal: Negative for vomiting.   Neurological: Positive for facial asymmetry, speech difficulty and weakness.   Psychiatric/Behavioral: Negative for agitation.     Scheduled Meds:   albuterol-ipratropium 2.5mg-0.5mg/3mL  3 mL Nebulization Q8H    aspirin  81 mg Per NG tube Daily    atorvastatin  40 mg Per NG tube Daily    B-complex with vitamin C  1 tablet Per NG tube Daily    calcium carbonate  500 mg Per NG tube BID WM    ceFEPime (MAXIPIME) IVPB  1 g Intravenous Q12H    diltiaZEM  90 mg Per NG tube Q6H    furosemide  40 mg Per G Tube BID    heparin (porcine)  5,000 Units Subcutaneous Q8H    levothyroxine  25 mcg Per NG tube Before breakfast    lisinopril  20 mg Per NG tube BID    polyethylene glycol  17 g Oral Daily    senna-docusate 8.6-50 mg  1 tablet Per NG tube BID    sodium chloride 0.9%  3 mL Intravenous Q8H    sodium chloride 3%  250 mL Intravenous Once    vancomycin (VANCOCIN) IVPB  15 mg/kg (Dosing Weight) Intravenous Q24H    vitamin D  1,000 Units Per NG tube Daily     Continuous Infusions:     PRN Meds:acetaminophen, dextrose 50%, glucagon (human recombinant), hydrALAZINE, flu vacc [id number] 65yr up(PF), insulin aspart, magnesium sulfate IVPB, magnesium sulfate IVPB, midazolam (PF), pneumoc 13-evette conj-dip cr(PF), rocuronium, sodium phosphate IVPB, sodium phosphate IVPB, sodium phosphate IVPB    Objective:     Vital Signs (Most Recent):  Temp: 99.1 °F (37.3 °C) (03/20/17  1500)  Pulse: 87 (17 1800)  Resp: 17 (17 1800)  BP: (!) 162/73 (17 1800)  SpO2: 96 % (17 1800)  BP Location: Right arm    Vital Signs Range (Last 24H):  Temp:  [97.9 °F (36.6 °C)-99.1 °F (37.3 °C)]   Pulse:  []   Resp:  [10-24]   BP: (130-173)/(58-84)   SpO2:  [96 %-100 %]   BP Location: Right arm    Physical Exam   Constitutional: She appears well-developed and well-nourished.   HENT:   Head: Normocephalic.   Eyes: Pupils are equal, round, and reactive to light.   Cardiovascular: Normal rate.    afib   Pulmonary/Chest: Effort normal. No respiratory distress.   Abdominal: Soft. She exhibits no distension.   Neurological:   Drowsy   Skin: Skin is warm and dry.   Nursing note and vitals reviewed.      Neurological Exam:   LOC: drowsy and follows simple commands  Language: Expressive aphasia  Speech: Aphasic   Orientation: Aphasic  EOM (CN III, IV, VI): Gaze preference left  Facial Movement (CN VII): lower weakness right lower  Motor*: Arm Left:  Paretic:  4/5, Leg Left:   Paretic:  4/5, Arm Right:   Paretic:  2/5, Leg Right:   Paretic:  2/5  Sensation: intact to light touch, temperature and vibration    NIH Stroke Scale:    Level of Consciousness: 1 - drowsy  LOC Questions: 2 - answers none correctly  LOC Commands: 0 - performs both correctly  Best Gaze: 1 - partial gaze palsy  Visual: 2 - complete hemianopia  Facial Palsy: 1 - minor  Motor Left Arm: 0 - no drift  Motor Right Arm: 2 - can't resist gravity  Motor Left Le - no drift  Motor Right Le - can't resist gravity  Limb Ataxia: 0 - absent  Sensory: 1 - mild to moderate loss  Best Language: 2 - severe aphasia  Dysarthria: 0 - normal articulation  Extinction and Inattention: 0 - no neglect  NIH Stroke Scale Total: 14      Laboratory:  CMP:     Recent Labs  Lab 17  0304   CALCIUM 8.9   ALBUMIN 2.2*   PROT 6.3      K 3.7   CO2 26      BUN 22   CREATININE 0.7   ALKPHOS 59   ALT 52*   AST 80*   BILITOT 0.6      CBC:     Recent Labs  Lab 03/20/17  0304   WBC 16.96*   RBC 3.18*   HGB 10.5*   HCT 30.7*      MCV 97   MCH 33.0*   MCHC 34.2     Lipid Panel:   No results for input(s): CHOL, LDLCALC, HDL, TRIG in the last 168 hours.  Coagulation:     Recent Labs  Lab 03/14/17  0232   INR 1.0     Platelet Aggregation Study: No results for input(s): PLTAGG, PLTAGINTERP, PLTAGREGLACO, ADPPLTAGGREG in the last 168 hours.  Hgb A1C:   No results for input(s): HGBA1C in the last 168 hours.  TSH:     Recent Labs  Lab 03/18/17  0204   TSH 2.079       Diagnostic Results:  I have personally reviewed  CT head. Date: 3/7/17  Temporal evolution of large left MCA territory  acute infarction with mass effect on the underlying parenchyma, sulci and left lateral ventricle with a 0.3 cm rightward midline shift.  No evidence of hydrocephalus or intracranial hemorrhage in this patient status post tPA.  Continued followup is recommended.     CTA stroke multiphase. Date: 3/7/17  -L M1 stenosis  -R vert narrowing    Echo. Date: 3/7/17  Moderately enlarged LA  CONCLUSIONS     1 - Concentric remodeling.     2 - Normal left ventricular systolic function (EF 60-65%).     3 - Right ventricular enlargement with moderately depressed systolic function.     4 - Left ventricular diastolic dysfunction.     5 - Biatrial enlargement.     6 - Mild aortic stenosis, MATEO = 1.6 cm2, peak velocity = 2.0 m/s, mean gradient = 10 mmHg.     7 - Severe tricuspid regurgitation.     8 - Pulmonary hypertension. The estimated PA systolic pressure is 102 mmHg.     9 - Increased central venous pressure.          Assessment/Plan:     81 year old female with L MCA stroke with right hemiparesis, and aphasia. History of afib. Received tpa on 3/6/17  Discussion with NCC team regarding goals of care noted.       3/10/17 Patient was made DNR/DNI, discussed prognosis with family, patient has some movement in R side and would like to give her more time to recover before determining  long term prognosis. Neurologically stable    3/11/17 Neurologically stable, more alert today    03/17/17 Patient following simple commands and had PEG placed yesterday, continued a fib with RVR being treated with metoprolol and diltiazem, also patient has PNA which is being treated with cefepime and vancomycin      * Embolic stroke involving left middle cerebral artery  Likely cardioembolic in etiology, given recent Dx of afib not on anticoagulation.  Patient now dnr /dni     Antithrombotics for secondary stroke prevention: asa 81    Statins for secondary stroke prevention and hyperlipidemia, if present: Atorvastatin- 40 mg oral daily.     Aggressive risk factor modification: Hypertension, High Cholesterol, Diet, Exercise and Obesity, Rehab Efforts: Physical Therapy, Occupational Therapy, Speech and Language Pathology and Physical Medicine and Rehabilitation    Diagnostics: Ordered/Pending  MRI head without contrast to assess brain parenchyma    VTE Prophylaxis: heparin sq    BP Parameters: SBP <180    Nursing Orders: Neuro checks- q1h, Antiembolic stockings, Swallowing evaluation by Nursing, Out of bed BID, Avoid Mace catheter, Pneumatic compression device, Stroke Education, Blood glucose target 100-130, Up with assistance and IV Fluids: Per primary team    Family and CM working towards snf placement     Hypothyroidism  Stroke RF  Continue synthroid    Essential hypertension  Stroke RF  SBP goal <180  Management per primary team    Paroxysmal atrial fibrillation  Stroke RF.  Likely etiology of patient's stroke.  Plan to start AC this admission.  Hold for now, given tPA and potential size of stroke and decision for aggressive therapy   Treatment per primary team    Hyperlipidemia  Stroke risk factor  Goal LDL <70  LDL 68 with patient on simvastatin at home  Switch to atorvastatin 40mg daily    Cytotoxic cerebral edema  As seen on imaging from Acute ischemic stroke - L MCA     Right flaccid hemiplegia  Due to  infarct  Therapy following  Improving     Oral phase dysphagia  PEG placed 03/16/17      JOSE Gerber  Comprehensive Stroke Center  Department of Vascular Neurology   [facility]

## 2017-03-21 NOTE — PLAN OF CARE
Problem: SLP Goal  Goal: SLP Goal  Updated Speech Language Pathology Goals  Goals expected to be met by 3/22  1. Pt will participate in ongoing swallowing assessment to determine if safe to begin PO intake.  2. Pt will vocalize x1 during session.  3. Pt will follow simple, one step commands given max cues with 70% acc to improve overall receptive language  4. Pt will answer simple y/n questions in any modality with 70% acc to improve overall receptive language skills  5. Pt will identify objects in FO2 via eye gaze with 60% acc to improve overall receptive language skills      Speech Language Pathology Goals  Goals expected to be met by 3/14:  1. Pt will participate in ongoing swallowing assessment to determine if safe to begin PO intake.  2. Pt will participate in speech/language evaluation to determine further goals.         Pt with good alertness during session and attempts to verbalize noted.      SAMINA Muse, CCC-SLP  3/21/2017

## 2017-03-21 NOTE — PROGRESS NOTES
ICU Progress/Stepdown Note  Neurocritical Care    Admit Date: 3/7/2017  LOS: 14    CC: Embolic stroke involving left middle cerebral artery    SUBJECTIVE:     Interval History/Significant Events: Leukocytosis persistent, but now afebrile.  Awaiting final results of cultures from 3/19.  Plan to transfer to vascular neurology today.  Discussed with team.     Review of Symptoms: anni, pt aphasic   Constitutional: Denies fevers or chills.  Pulmonary: Denies shortness of breath or cough.  Cardiology: Denies chest pain or palpitations.  GI: Denies abdominal pain or constipation.  Neurologic: Denies new weakness, headaches, or paresthesias.     Medications:  Continuous Infusions:     Scheduled Meds:   albuterol-ipratropium 2.5mg-0.5mg/3mL  3 mL Nebulization Q8H    aspirin  81 mg Per NG tube Daily    atorvastatin  40 mg Per NG tube Daily    B-complex with vitamin C  1 tablet Per NG tube Daily    calcium carbonate  500 mg Per NG tube BID WM    ceFEPime (MAXIPIME) IVPB  1 g Intravenous Q12H    diltiaZEM  90 mg Per NG tube Q6H    furosemide  40 mg Per G Tube BID    heparin (porcine)  5,000 Units Subcutaneous Q8H    levothyroxine  25 mcg Per NG tube Before breakfast    lisinopril  20 mg Per NG tube BID    polyethylene glycol  17 g Oral Daily    senna-docusate 8.6-50 mg  1 tablet Per NG tube BID    sodium chloride 0.9%  3 mL Intravenous Q8H    sodium chloride 3%  250 mL Intravenous Once    vancomycin (VANCOCIN) IVPB  15 mg/kg (Dosing Weight) Intravenous Q24H    vitamin D  1,000 Units Per NG tube Daily     PRN Meds:.acetaminophen, dextrose 50%, glucagon (human recombinant), hydrALAZINE, flu vacc [id number] 65yr up(PF), insulin aspart, magnesium sulfate IVPB, magnesium sulfate IVPB, midazolam (PF), pneumoc 13-evette conj-dip cr(PF), potassium chloride 10%, potassium chloride 10%, potassium chloride 10%, rocuronium, sodium phosphate IVPB, sodium phosphate IVPB, sodium phosphate IVPB    OBJECTIVE:     I & O  (24h):    Intake/Output Summary (Last 24 hours) at 03/21/17 1043  Last data filed at 03/21/17 1000   Gross per 24 hour   Intake             1770 ml   Output             3295 ml   Net            -1525 ml       Physical Exam:  Last Vitals:  Vitals:    03/21/17 0900   BP: 139/67   Pulse: 65   Resp: 13   Temp:      Vital Signs (24h Range):   Temp:  [97.8 °F (36.6 °C)-99.1 °F (37.3 °C)] 97.8 °F (36.6 °C)  Pulse:  [60-97] 65  Resp:  [10-24] 13  SpO2:  [94 %-98 %] 98 %  BP: (107-173)/(52-77) 139/67  GA: More alert on PE, no acute distress.   HEENT: No scleral icterus or JVD.   Pulmonary: Clear to auscultation A/P/L. No wheezing, crackles, or rhonchi.  Cardiac: RRR S1 & S2 w/o rubs/murmurs/gallops.   Abdominal: Bowel sounds present x 4. No appreciable hepatosplenomegaly.  Skin: No jaundice, rashes, or visible lesions.  Neuro:  --GCS: E4 V1 M6  --Mental Status:  Expressive aphasia.  Follows simple commands intermittently.   --CN II-XII grossly intact.   --Pupils 3mm, PERRL.   --Corneal reflex, gag, cough intact.  --moves left extremities spontaneously, moves RLE spontaneously   --withdraws to pain in RUE    Invasive Lines:  --Central Line:        PICC Double Lumen 03/08/17 1208 left brachial (Active)   Site Assessment Clean;Dry;Intact;No redness;No swelling 3/13/2017  7:01 AM   Lumen 1 Status Flushed;Blood return noted;Infusing 3/13/2017  7:01 AM   Lumen 2 Status Infusing 3/13/2017  7:01 AM   Length verónica (cm) 36 cm 3/8/2017 12:05 PM   Current Exposed Catheter (cm) 0 cm 3/8/2017 12:05 PM   Extremity Circumference (cm) 25 cm 3/8/2017 12:05 PM   Dressing Type Transparent 3/13/2017  7:01 AM   Dressing Status Biopatch in place;Clean;Dry;Intact 3/13/2017  7:01 AM   Dressing Intervention Dressing reinforced 3/13/2017  7:01 AM   Dressing Change Due 03/15/17 3/13/2017  7:01 AM   Daily Line Review Performed 3/13/2017  7:01 AM         --Surgical Drains/Mace:        NG/OG Tube 03/08/17 0615 Rodney arroyo 14 Fr. Right nostril (Active)  "  Placement Check placement verified by distal tube length measurement;placement verified by x-ray 3/13/2017  7:01 AM   Distal Tube Length (cm) 63 3/10/2017  3:05 AM   Tolerance no signs/symptoms of discomfort 3/13/2017  7:01 AM   Securement anchored to nostril center w/ adhesive device 3/13/2017  7:01 AM   Clamp Status/Tolerance unclamped;no abdominal discomfort;no abdominal distention;no emesis;no nausea;no residual;no restlessness 3/13/2017  7:01 AM   Insertion Site Appearance no redness, warmth, tenderness, skin breakdown, drainage 3/13/2017  7:01 AM   Flush/Irrigation flushed w/;water;no resistance met 3/13/2017  7:01 AM   Feeding Method continuous 3/13/2017  7:01 AM   Current Rate (mL/hr) 40 mL/hr 3/13/2017  7:01 AM   Goal Rate (mL/hr) 40 mL/hr 3/13/2017  7:01 AM   Intake (mL) 150 mL 3/13/2017  8:00 AM   Intake (mL) - Formula Tube Feeding 40 3/13/2017 12:00 PM   Residual Amount (ml) 10 ml 3/12/2017  7:01 AM            Urethral Catheter 03/12/17 1300 (Active)   Site Assessment Clean;Intact 3/13/2017  7:01 AM   Collection Container Urimeter 3/13/2017  7:01 AM   Securement Method secured to top of thigh w/ adhesive device 3/13/2017  7:01 AM   Catheter Care Performed yes 3/13/2017  7:01 AM   Reason for Continuing Urinary Catheterization Critically ill in ICU requiring intensive monitoring 3/13/2017  7:01 AM   CAUTI Prevention Bundle StatLock in place w 1" slack;Intact seal between catheter & drainage tubing;Drainage bag off the floor;Green sheeting clip in use;No dependent loops or kinks;Drainage bag not overfilled (<2/3 full);Drainage bag below bladder 3/13/2017  7:01 AM   Output (mL) 450 mL 3/13/2017 12:00 PM       Nutrition: TF via PEG     Labs:  ABG:   No results for input(s): PH, PO2, PCO2, HCO3, POCSATURATED, BE in the last 24 hours.    BMP:    Recent Labs  Lab 03/21/17  0219   *   K 3.6      CO2 27   BUN 23   CREATININE 0.7   *   MG 1.7   PHOS 3.5       LFT:   Lab Results   Component " Value Date    AST 63 (H) 03/21/2017    ALT 44 03/21/2017    ALKPHOS 55 03/21/2017    BILITOT 0.6 03/21/2017    ALBUMIN 2.0 (L) 03/21/2017    PROT 6.0 03/21/2017       CBC:   Lab Results   Component Value Date    WBC 15.85 (H) 03/21/2017    HGB 9.2 (L) 03/21/2017    HCT 27.3 (L) 03/21/2017    MCV 96 03/21/2017     03/21/2017       Microbiology x 7d:   Microbiology Results (last 7 days)     Procedure Component Value Units Date/Time    Blood culture [[id number]] Collected:  03/18/17 2212    Order Status:  Completed Specimen:  Blood from Peripheral, Wrist, Right Updated:  03/21/17 0612     Blood Culture, Routine No Growth to date     Blood Culture, Routine No Growth to date     Blood Culture, Routine No Growth to date    Blood culture [[id number]] Collected:  03/18/17 2211    Order Status:  Completed Specimen:  Blood from Peripheral, Hand, Right Updated:  03/21/17 0612     Blood Culture, Routine No Growth to date     Blood Culture, Routine No Growth to date     Blood Culture, Routine No Growth to date    Urine culture [[id number]] Collected:  03/19/17 1030    Order Status:  Completed Specimen:  Urine from Urine, Catheterized Updated:  03/20/17 1420     Urine Culture, Routine No growth    Culture, Respiratory [[id number]] Collected:  03/19/17 1108    Order Status:  Completed Specimen:  Respiratory from Sputum Updated:  03/20/17 1219     Respiratory Culture Normal respiratory richard     Gram Stain (Respiratory) Few WBC's     Gram Stain (Respiratory) Rare Gram positive cocci     Gram Stain (Respiratory) Rare Gram positive rods     Gram Stain (Respiratory) Rare yeast    Culture, Respiratory [[id number]] Collected:  03/13/17 1608    Order Status:  Completed Specimen:  Respiratory from Sputum Updated:  03/16/17 0950     Respiratory Culture Normal respiratory richard     Gram Stain (Respiratory) <10 epithelial cells per low power field.     Gram Stain (Respiratory) Rare WBC's     Gram Stain (Respiratory) Few Gram positive cocci      Gram Stain (Respiratory) Rare budding yeast     Gram Stain (Respiratory) Rare Gram negative rods          Imaging:  I have personally reviewed.     ASSESSMENT/PLAN:     Patient Active Problem List   Diagnosis    Hypothyroidism    Essential hypertension    Paroxysmal atrial fibrillation    Hyperlipidemia    Embolic stroke involving left middle cerebral artery    Cytotoxic cerebral edema    Right flaccid hemiplegia    Pleural effusion, bilateral    Pulmonary hypertension    Brain compression    Oral phase dysphagia       Neuro:  L MCA/GIRMA infarct  --continue atorvastatin, asa   --neuro exam stable  --PT/OT/ SLP    Pulmonary:  --NC prn    Cardiac:  Atrial fibrillation  --dilatiazem 60 mg qid    HTN  ---160 in setting of hemorrhagic conversion   --lisinopril 20 mg daily     Renal:   --BUN, creat stable  --24 hour net I/O: -1.6 L    ID:  --WBC 15  --afebrile overnight  --pancultured 3/19  --continue broad spectrum antibiotics while awaiting culture results    Hem/Onc:  --H/H decreased but stable  --platelets 318    Endocrine:  --A1C 5.9  --TSH 0.109, free T4 1.23  --continue levothyroxine 25 mcg daily     Fluids/Electrolytes/Nutrition/GI:  --TF via PEG     Level II    Lissa Renner PA-C  Neuro Critical Care  [id number]

## 2017-03-21 NOTE — ASSESSMENT & PLAN NOTE
Likely cardioembolic in etiology, given recent Dx of afib not on anticoagulation.  Patient now dnr /dni     Antithrombotics for secondary stroke prevention: asa 81    Statins for secondary stroke prevention and hyperlipidemia, if present: Atorvastatin- 40 mg oral daily.     Aggressive risk factor modification: Hypertension, High Cholesterol, Diet, Exercise and Obesity, Rehab Efforts: Physical Therapy, Occupational Therapy, Speech and Language Pathology and Physical Medicine and Rehabilitation    Diagnostics: Ordered/Pending  MRI head without contrast to assess brain parenchyma    VTE Prophylaxis: heparin sq    BP Parameters: SBP <180    Nursing Orders: Neuro checks- q1h, Antiembolic stockings, Swallowing evaluation by Nursing, Out of bed BID, Avoid Mace catheter, Pneumatic compression device, Stroke Education, Blood glucose target 100-130, Up with assistance and IV Fluids: Per primary team    Family and CM working towards snf placement

## 2017-03-21 NOTE — PLAN OF CARE
Problem: Patient Care Overview  Goal: Plan of Care Review  Outcome: Ongoing (interventions implemented as appropriate)  No acute events throughout the day, VS and assessment per flow sheet, patient progressing towards goal as tolerated. Plan of care reviewed with Zena Aguirre, , and son, all concerns addressed. Will continue to monitor.

## 2017-03-21 NOTE — PLAN OF CARE
03/21/17 1041   Medicare Message   Important Message from Medicare regarding Discharge Appeal Rights Given to patient/caregiver;Explained to patient/caregiver;Signed/date by patient/caregiver   Date IMM was signed 03/21/17   Time IMM was signed 1025     IMM signed in anticipation of dishcarge to SNF soon.       Hortencia Gates RN, CCRN-K, [facility]  Neuro-Critical Care   [phone]

## 2017-03-21 NOTE — PT/OT/SLP PROGRESS
Occupational Therapy  Treatment    Zena Aguirre   MRN: [id number]   Admitting Diagnosis: Embolic stroke involving left middle cerebral artery    OT Date of Treatment: 17   OT Start Time: 1202  OT Stop Time: 1240  OT Total Time (min): 38 min    Billable Minutes:  Self Care/Home Management 13, Therapeutic Activity 15 and Therapeutic Exercise 15    General Precautions: Standard, aspiration, fall, NPO (DNR)    Do you have any cultural, spiritual, Mosque conflicts, given your current situation?: Mormon    Subjective:  Communicated with RN prior to session.  Pt with 1 attempt to verbalize during session.    Pain Ratin/10  Pain Rating Post-Intervention: 0/10    Objective:  Patient found with: blood pressure cuff, pulse ox (continuous), telemetry, sesay catheter, peripheral IV (PEG)     Functional Mobility:  Bed Mobility:  Rolling/Turning to Left: Total assistance  Rolling/Turning Right: Maximum assistance  Scooting/Bridging: Total Assistance (scooting forward on EOB; Total (A) with scooting up HOB while supine)  Supine to Sit: Total Assistance  Sit to Supine: Total Assistance      Activities of Daily Living:     Grooming Position: Seated, EOB  Grooming Level of Assistance: Total assistance (hand over hand (A) required)     Therapeutic Activities and Exercises:  PA for [facility] approved EOB on this date.  Pt required Min-Max (A) with postural control while seated EOB due to posterior & rightward leaning.  Provided (A) to decrease pushing with LUE while seated EOB.  Provided activities to facilitate visual attention to the right while seated EOB. Provided RUE weight bearing while seated EOB. Provided PROM to RUE & RLE in all available planes x 10 reps each while supine.  Pt's family had no further questions & when asked whether there were any concerns pt's family reported none.      AM-PAC 6 CLICK ADL   How much help from another person does this patient currently need?   1 = Unable, Total/Dependent Assistance  2 = A  lot, Maximum/Moderate Assistance  3 = A little, Minimum/Contact Guard/Supervision  4 = None, Modified [place]/Independent    Putting on and taking off regular lower body clothing? : 1  Bathing (including washing, rinsing, drying)?: 1  Toileting, which includes using toilet, bedpan, or urinal? : 1  Putting on and taking off regular upper body clothing?: 1  Taking care of personal grooming such as brushing teeth?: 1  Eating meals?: 1  Total Score: 6     AM-PAC Raw Score CMS G-Code Modifier Level of Impairment Assistance   6 % Total / Unable   7 - 9 CM 80 - 100% Maximal Assist   10 - 14 CL 60 - 80% Moderate Assist   15 - 19 CK 40 - 60% Moderate Assist   20 - 22 CJ 20 - 40% Minimal Assist   23 CI 1-20% SBA / CGA   24 CH 0% Independent/ Mod I     Patient left supine with all lines intact, call button in reach, RN notified, family present and white board updated.    ASSESSMENT:  Zena Aguirre is a 81 y.o. female with a medical diagnosis of Embolic stroke involving left middle cerebral artery and presents with fair participation and motivation.  Pt with fair endurance for EOB on this date.  Pt  Continues to require (A) with all activities.    Rehab identified problem list/impairments: Rehab identified problem list/impairments: weakness, impaired endurance, impaired sensation, impaired balance, impaired self care skills, visual deficits, impaired functional mobilty, impaired cognition, decreased coordination, decreased upper extremity function, decreased lower extremity function, decreased safety awareness    Rehab potential is fair.    Activity tolerance: Fair    Discharge recommendations: Discharge Facility/Level Of Care Needs: nursing facility, skilled     GOALS:   Occupational Therapy Goals        Problem: Occupational Therapy Goal    Goal Priority Disciplines Outcome Interventions   Occupational Therapy Goal     OT, PT/OT Ongoing (interventions implemented as appropriate)    Description:  Goals set 3/18 to  be addressed for 14 days with expiration date, 4/1:  Patient will increase functional independence with ADLs by performing:    Patient will demonstrate rolling to the right with min assist.  Not met   Patient will demonstrate rolling to the left with mod assist.   Not met  Patient will demonstrate supine -sit with mod assist.   Not met  Patient will demonstrate stand pivot transfers with mod assist.   Not met  Patient will demonstrate grooming while standing with mod assist.   Not met  Patient will demonstrate upper body dressing with mod assist while seated EOB.   Not met  Patient will demonstrate lower body dressing with max assist while seated EOB.   Not met  Patient will demonstrate toileting with max assist.   Not met  Patient will demonstrate bathing while seated EOB with max assist.   Not met  Patient will demonstrate ability to follow 3/4 commands.   Not met  atient's family / caregiver will demonstrate independence and safety with assisting patient with self-care skills and functional mobility.     Not met  Patient's family / caregiver will demonstrate independence with providing ROM and changes in bed positioning.   Not met  Patient and/or patient's family will verbalize understanding of stroke prevention guidelines, personal risk factors and stroke warning signs via teachback method.  Not met                       Plan:  Patient to be seen 5 x/week to address the above listed problems via self-care/home management, neuromuscular re-education, cognitive retraining, sensory integration, therapeutic activities, therapeutic exercises  Plan of Care expires: 04/06/17  Plan of Care reviewed with: patient, spouse         VERONA Hidalgo  03/21/2017

## 2017-03-21 NOTE — SUBJECTIVE & OBJECTIVE
Neurologic Chief Complaint: L MCA stroke     Subjective:     Interval History: Patient is seen for follow-up neurological assessment and treatment recommendations: DVT in left arm , picc removed. Doing well otherwise, (+) neuro stable     HPI, Past Medical, Family, and Social History remains the same as documented in the initial encounter.     Review of Systems   Constitutional: Negative for fever.   Gastrointestinal: Negative for vomiting.   Neurological: Positive for facial asymmetry, speech difficulty and weakness.   Psychiatric/Behavioral: Negative for agitation.     Scheduled Meds:   albuterol-ipratropium 2.5mg-0.5mg/3mL  3 mL Nebulization Q8H    aspirin  81 mg Per NG tube Daily    atorvastatin  40 mg Per NG tube Daily    B-complex with vitamin C  1 tablet Per NG tube Daily    calcium carbonate  500 mg Per NG tube BID WM    ceFEPime (MAXIPIME) IVPB  1 g Intravenous Q12H    diltiaZEM  90 mg Per NG tube Q6H    furosemide  40 mg Per G Tube BID    heparin (porcine)  5,000 Units Subcutaneous Q8H    levothyroxine  25 mcg Per NG tube Before breakfast    lisinopril  20 mg Per NG tube BID    polyethylene glycol  17 g Oral Daily    senna-docusate 8.6-50 mg  1 tablet Per NG tube BID    sodium chloride 0.9%  3 mL Intravenous Q8H    sodium chloride 3%  250 mL Intravenous Once    vancomycin (VANCOCIN) IVPB  15 mg/kg (Dosing Weight) Intravenous Q24H    vitamin D  1,000 Units Per NG tube Daily     Continuous Infusions:     PRN Meds:acetaminophen, dextrose 50%, glucagon (human recombinant), hydrALAZINE, flu vacc [id number] 65yr up(PF), insulin aspart, magnesium sulfate IVPB, magnesium sulfate IVPB, midazolam (PF), pneumoc 13-evette conj-dip cr(PF), rocuronium, sodium phosphate IVPB, sodium phosphate IVPB, sodium phosphate IVPB    Objective:     Vital Signs (Most Recent):  Temp: 99.1 °F (37.3 °C) (03/20/17 1500)  Pulse: 87 (03/20/17 1800)  Resp: 17 (03/20/17 1800)  BP: (!) 162/73 (03/20/17 1800)  SpO2: 96 %  (17 1800)  BP Location: Right arm    Vital Signs Range (Last 24H):  Temp:  [97.9 °F (36.6 °C)-99.1 °F (37.3 °C)]   Pulse:  []   Resp:  [10-24]   BP: (130-173)/(58-84)   SpO2:  [96 %-100 %]   BP Location: Right arm    Physical Exam   Constitutional: She appears well-developed and well-nourished.   HENT:   Head: Normocephalic.   Eyes: Pupils are equal, round, and reactive to light.   Cardiovascular: Normal rate.    afib   Pulmonary/Chest: Effort normal. No respiratory distress.   Abdominal: Soft. She exhibits no distension.   Neurological:   Drowsy   Skin: Skin is warm and dry.   Nursing note and vitals reviewed.      Neurological Exam:   LOC: drowsy and follows simple commands  Language: Expressive aphasia  Speech: Aphasic   Orientation: Aphasic  EOM (CN III, IV, VI): Gaze preference left  Facial Movement (CN VII): lower weakness right lower  Motor*: Arm Left:  Paretic:  4/5, Leg Left:   Paretic:  4/5, Arm Right:   Paretic:  2/5, Leg Right:   Paretic:  2/5  Sensation: intact to light touch, temperature and vibration    NIH Stroke Scale:    Level of Consciousness: 1 - drowsy  LOC Questions: 2 - answers none correctly  LOC Commands: 0 - performs both correctly  Best Gaze: 1 - partial gaze palsy  Visual: 2 - complete hemianopia  Facial Palsy: 1 - minor  Motor Left Arm: 0 - no drift  Motor Right Arm: 2 - can't resist gravity  Motor Left Le - no drift  Motor Right Le - can't resist gravity  Limb Ataxia: 0 - absent  Sensory: 1 - mild to moderate loss  Best Language: 2 - severe aphasia  Dysarthria: 0 - normal articulation  Extinction and Inattention: 0 - no neglect  NIH Stroke Scale Total: 14      Laboratory:  CMP:     Recent Labs  Lab 17  0304   CALCIUM 8.9   ALBUMIN 2.2*   PROT 6.3      K 3.7   CO2 26      BUN 22   CREATININE 0.7   ALKPHOS 59   ALT 52*   AST 80*   BILITOT 0.6     CBC:     Recent Labs  Lab 17  0304   WBC 16.96*   RBC 3.18*   HGB 10.5*   HCT 30.7*      MCV  97   MCH 33.0*   MCHC 34.2     Lipid Panel:   No results for input(s): CHOL, LDLCALC, HDL, TRIG in the last 168 hours.  Coagulation:     Recent Labs  Lab 03/14/17  0232   INR 1.0     Platelet Aggregation Study: No results for input(s): PLTAGG, PLTAGINTERP, PLTAGREGLACO, ADPPLTAGGREG in the last 168 hours.  Hgb A1C:   No results for input(s): HGBA1C in the last 168 hours.  TSH:     Recent Labs  Lab 03/18/17  0204   TSH 2.079       Diagnostic Results:  I have personally reviewed  CT head. Date: 3/7/17  Temporal evolution of large left MCA territory  acute infarction with mass effect on the underlying parenchyma, sulci and left lateral ventricle with a 0.3 cm rightward midline shift.  No evidence of hydrocephalus or intracranial hemorrhage in this patient status post tPA.  Continued followup is recommended.     CTA stroke multiphase. Date: 3/7/17  -L M1 stenosis  -R vert narrowing    Echo. Date: 3/7/17  Moderately enlarged LA  CONCLUSIONS     1 - Concentric remodeling.     2 - Normal left ventricular systolic function (EF 60-65%).     3 - Right ventricular enlargement with moderately depressed systolic function.     4 - Left ventricular diastolic dysfunction.     5 - Biatrial enlargement.     6 - Mild aortic stenosis, MATEO = 1.6 cm2, peak velocity = 2.0 m/s, mean gradient = 10 mmHg.     7 - Severe tricuspid regurgitation.     8 - Pulmonary hypertension. The estimated PA systolic pressure is 102 mmHg.     9 - Increased central venous pressure.

## 2017-03-21 NOTE — SUBJECTIVE & OBJECTIVE
Neurologic Chief Complaint: L MCA stroke     Subjective:     Interval History: Patient is seen for follow-up neurological assessment and treatment recommendations: NAEON, patient sitting on edge of bed with therapy today    HPI, Past Medical, Family, and Social History remains the same as documented in the initial encounter.     Review of Systems   Constitutional: Negative for fever.   HENT: Negative for drooling.    Respiratory: Negative for cough and shortness of breath.    Cardiovascular: Negative for leg swelling.   Gastrointestinal: Negative for vomiting.   Musculoskeletal: Negative for joint swelling.   Skin: Negative for rash.   Neurological: Positive for facial asymmetry, speech difficulty and weakness.   Psychiatric/Behavioral: Negative for agitation.     Scheduled Meds:   albuterol-ipratropium 2.5mg-0.5mg/3mL  3 mL Nebulization Q8H    [START ON 3/22/2017] aspirin  81 mg Per G Tube Daily    [START ON 3/22/2017] atorvastatin  40 mg Per G Tube Daily    [START ON 3/22/2017] B-complex with vitamin C  1 tablet Per G Tube Daily    calcium carbonate  500 mg Per G Tube BID WM    ceFEPime (MAXIPIME) IVPB  1 g Intravenous Q12H    diltiaZEM  90 mg Per G Tube Q6H    furosemide  40 mg Per G Tube BID    heparin (porcine)  5,000 Units Subcutaneous Q8H    [START ON 3/22/2017] levothyroxine  25 mcg Per G Tube Before breakfast    lisinopril  20 mg Per NG tube BID    [START ON 3/22/2017] polyethylene glycol  17 g Per G Tube Daily    senna-docusate 8.6-50 mg  1 tablet Per G Tube BID    sodium chloride 0.9%  3 mL Intravenous Q8H    sodium chloride 3%  250 mL Intravenous Once    [START ON 3/22/2017] vancomycin (VANCOCIN) IVPB  1,250 mg Intravenous Q24H    [START ON 3/22/2017] vitamin D  1,000 Units Per G Tube Daily     Continuous Infusions:     PRN Meds:acetaminophen, dextrose 50%, glucagon (human recombinant), flu vacc [id number] 65yr up(PF), insulin aspart, midazolam (PF), pneumoc 13-evette conj-dip  cr(PF)    Objective:     Vital Signs (Most Recent):  Temp: 98 °F (36.7 °C) (17 1500)  Pulse: 65 (17 1540)  Resp: 12 (17 1540)  BP: 138/65 (17 1500)  SpO2: 97 % (17 1500)  BP Location: Right arm    Vital Signs Range (Last 24H):  Temp:  [97.7 °F (36.5 °C)-99.1 °F (37.3 °C)]   Pulse:  [54-97]   Resp:  [10-24]   BP: (107-173)/(52-77)   SpO2:  [94 %-98 %]   BP Location: Right arm    Physical Exam   Constitutional: She appears well-developed and well-nourished.   HENT:   Head: Normocephalic.   Eyes: Pupils are equal, round, and reactive to light.   Cardiovascular: Normal rate.    afib   Pulmonary/Chest: Effort normal. No respiratory distress.   Abdominal: Soft. She exhibits no distension.   Neurological: She is alert.   Drowsy   Skin: Skin is warm and dry.   Nursing note and vitals reviewed.      Neurological Exam:   LOC: drowsy and follows simple commands  Language: Expressive aphasia  Speech: Aphasic   Orientation: Aphasic  EOM (CN III, IV, VI): Gaze preference left  Facial Movement (CN VII): lower weakness right lower  Motor*: Arm Left:  Paretic:  4/5, Leg Left:   Paretic:  4/5, Arm Right:   Paretic:  2/5, Leg Right:   Paretic:  2/5  Sensation: intact to light touch, temperature and vibration    NIH Stroke Scale:    Level of Consciousness: 0 - alert  LOC Questions: 2 - answers none correctly  LOC Commands: 1 - performs one correctly  Best Gaze: 1 - partial gaze palsy  Visual: 2 - complete hemianopia  Facial Palsy: 1 - minor  Motor Left Arm: 0 - no drift  Motor Right Arm: 2 - can't resist gravity  Motor Left Le - no drift  Motor Right Le - can't resist gravity  Limb Ataxia: 0 - absent  Sensory: 0 - normal  Best Language: 2 - severe aphasia  Dysarthria: 0 - normal articulation  Extinction and Inattention: 0 - no neglect  NIH Stroke Scale Total: 13      Laboratory:  CMP:     Recent Labs  Lab 17  0219 17  1424   CALCIUM 8.6*  --    ALBUMIN 2.0*  --    PROT 6.0  --    NA  135*  --    K 3.6 4.2   CO2 27  --      --    BUN 23  --    CREATININE 0.7  --    ALKPHOS 55  --    ALT 44  --    AST 63*  --    BILITOT 0.6  --      CBC:     Recent Labs  Lab 03/21/17  0219   WBC 15.85*   RBC 2.86*   HGB 9.2*   HCT 27.3*      MCV 96   MCH 32.2*   MCHC 33.7     Lipid Panel:   No results for input(s): CHOL, LDLCALC, HDL, TRIG in the last 168 hours.  Coagulation:   No results for input(s): INR, APTT in the last 168 hours.    Invalid input(s): PT  Platelet Aggregation Study: No results for input(s): PLTAGG, PLTAGINTERP, PLTAGREGLACO, ADPPLTAGGREG in the last 168 hours.  Hgb A1C:   No results for input(s): HGBA1C in the last 168 hours.  TSH:     Recent Labs  Lab 03/18/17  0204   TSH 2.079       Diagnostic Results:  I have personally reviewed  CT head. Date: 3/7/17  Temporal evolution of large left MCA territory  acute infarction with mass effect on the underlying parenchyma, sulci and left lateral ventricle with a 0.3 cm rightward midline shift.  No evidence of hydrocephalus or intracranial hemorrhage in this patient status post tPA.  Continued followup is recommended.     CTA stroke multiphase. Date: 3/7/17  -L M1 stenosis  -R vert narrowing    Echo. Date: 3/7/17  Moderately enlarged LA  CONCLUSIONS     1 - Concentric remodeling.     2 - Normal left ventricular systolic function (EF 60-65%).     3 - Right ventricular enlargement with moderately depressed systolic function.     4 - Left ventricular diastolic dysfunction.     5 - Biatrial enlargement.     6 - Mild aortic stenosis, MATEO = 1.6 cm2, peak velocity = 2.0 m/s, mean gradient = 10 mmHg.     7 - Severe tricuspid regurgitation.     8 - Pulmonary hypertension. The estimated PA systolic pressure is 102 mmHg.     9 - Increased central venous pressure.

## 2017-03-21 NOTE — PT/OT/SLP PROGRESS
Speech Language Pathology  Treatment    Zena Aguirre   MRN: [id number]   Admitting Diagnosis: Embolic stroke involving left middle cerebral artery    Diet recommendations: Solid Diet Level: NPO  Liquid Diet Level: NPO     SLP Treatment Date: 17  Speech Start Time: 911     Speech Stop Time: 928     Speech Total (min): 17 min       TREATMENT BILLABLE MINUTES:  Speech Therapy Individual 9 and Treatment Swallowing Dysfunction 8    Has the patient been evaluated by SLP for swallowing? : Yes  Keep patient NPO?: Yes   General Precautions: Standard, aspiration, aphasia, fall  Current Respiratory Status: other (comment) (room air)       Subjective:  Pt attempted verbalizations but unintelligible    Pain Ratin/10              Pain Rating Post-Intervention: 0/10    Objective:      Pt seen bedside with  and daughter present. Pt awake/alert for entire session. Pt attempted to count and sing but no intelligible words noted. She did not complete automatic phrases/sentences. Vocalizations were low volume. She did not attempt to Id objects in a f=2 and did not response to any y/n questions or model any commands. Pt given an ice chip and teaspoons of nectar thick liquids. Pt did accept the bolus. Pt with delayed swallow response and reduced hyolaryngeal elevation. Pt also noted to have double swallow following all po trials. No overt coughing or choking noted. Pt did not consistently vocalize following the swallows but slight wet voice noted x2. White board updated and education provided to pt and family re: poc; aphasia and npo. All questions answered  FIM:              Comprehension: 1 Total Assistance--The patient understands direction and conversation about basic daily needs less than 25% of the time, or does no understand simple, commonly used spoken expressions (e.g hello, how are you) or gestures (e.g. waving good-bye, thank   Expression: 1 Total Assistance--The patient expresses basic daily needs and ideas  less than 25% of the time, or does not express basic needs appropriately or consistently despite prompting.          Assessment:  Zena Aguirre is a 81 y.o. female with a medical diagnosis of Embolic stroke involving left middle cerebral artery and presents with oral pharyngeal dysphagia and aphasia.Progressing towards goals with good alertness noted today.     Discharge recommendations: Discharge Facility/Level Of Care Needs: nursing facility, skilled     Goals:   SLP Goals        Problem: SLP Goal    Goal Priority Disciplines Outcome   SLP Goal     SLP Ongoing (interventions implemented as appropriate)   Description:  Updated Speech Language Pathology Goals  Goals expected to be met by 3/22  1. Pt will participate in ongoing swallowing assessment to determine if safe to begin PO intake.  2. Pt will vocalize x1 during session.  3. Pt will follow simple, one step commands given max cues with 70% acc to improve overall receptive language  4. Pt will answer simple y/n questions in any modality with 70% acc to improve overall receptive language skills  5. Pt will identify objects in FO2 via eye gaze with 60% acc to improve overall receptive language skills      Speech Language Pathology Goals  Goals expected to be met by 3/14:  1. Pt will participate in ongoing swallowing assessment to determine if safe to begin PO intake.  2. Pt will participate in speech/language evaluation to determine further goals.                     Plan:   Patient to be seen Therapy Frequency: 5 x/week   Plan of Care expires: 04/05/17  Plan of Care reviewed with: patient, spouse, daughter  SLP Follow-up?: Yes              SAMINA Muse, CCC-SLP  03/21/2017

## 2017-03-21 NOTE — PROGRESS NOTES
[facility]  Vascular Neurology  Comprehensive Stroke Center  Progress Note      Neurologic Chief Complaint: L MCA stroke     Subjective:     Interval History: Patient is seen for follow-up neurological assessment and treatment recommendations: NAEON, patient sitting on edge of bed with therapy today    HPI, Past Medical, Family, and Social History remains the same as documented in the initial encounter.     Review of Systems   Constitutional: Negative for fever.   HENT: Negative for drooling.    Respiratory: Negative for cough and shortness of breath.    Cardiovascular: Negative for leg swelling.   Gastrointestinal: Negative for vomiting.   Musculoskeletal: Negative for joint swelling.   Skin: Negative for rash.   Neurological: Positive for facial asymmetry, speech difficulty and weakness.   Psychiatric/Behavioral: Negative for agitation.     Scheduled Meds:   albuterol-ipratropium 2.5mg-0.5mg/3mL  3 mL Nebulization Q8H    [START ON 3/22/2017] aspirin  81 mg Per G Tube Daily    [START ON 3/22/2017] atorvastatin  40 mg Per G Tube Daily    [START ON 3/22/2017] B-complex with vitamin C  1 tablet Per G Tube Daily    calcium carbonate  500 mg Per G Tube BID WM    ceFEPime (MAXIPIME) IVPB  1 g Intravenous Q12H    diltiaZEM  90 mg Per G Tube Q6H    furosemide  40 mg Per G Tube BID    heparin (porcine)  5,000 Units Subcutaneous Q8H    [START ON 3/22/2017] levothyroxine  25 mcg Per G Tube Before breakfast    lisinopril  20 mg Per NG tube BID    [START ON 3/22/2017] polyethylene glycol  17 g Per G Tube Daily    senna-docusate 8.6-50 mg  1 tablet Per G Tube BID    sodium chloride 0.9%  3 mL Intravenous Q8H    sodium chloride 3%  250 mL Intravenous Once    [START ON 3/22/2017] vancomycin (VANCOCIN) IVPB  1,250 mg Intravenous Q24H    [START ON 3/22/2017] vitamin D  1,000 Units Per G Tube Daily     Continuous Infusions:     PRN Meds:acetaminophen, dextrose 50%, glucagon (human recombinant),  flu vacc [id number] 65yr up(PF), insulin aspart, midazolam (PF), pneumoc 13-evette conj-dip cr(PF)    Objective:     Vital Signs (Most Recent):  Temp: 98 °F (36.7 °C) (17 1500)  Pulse: 65 (17 1540)  Resp: 12 (17 1540)  BP: 138/65 (17 1500)  SpO2: 97 % (17 1500)  BP Location: Right arm    Vital Signs Range (Last 24H):  Temp:  [97.7 °F (36.5 °C)-99.1 °F (37.3 °C)]   Pulse:  [54-97]   Resp:  [10-24]   BP: (107-173)/(52-77)   SpO2:  [94 %-98 %]   BP Location: Right arm    Physical Exam   Constitutional: She appears well-developed and well-nourished.   HENT:   Head: Normocephalic.   Eyes: Pupils are equal, round, and reactive to light.   Cardiovascular: Normal rate.    afib   Pulmonary/Chest: Effort normal. No respiratory distress.   Abdominal: Soft. She exhibits no distension.   Neurological: She is alert.   Drowsy   Skin: Skin is warm and dry.   Nursing note and vitals reviewed.      Neurological Exam:   LOC: drowsy and follows simple commands  Language: Expressive aphasia  Speech: Aphasic   Orientation: Aphasic  EOM (CN III, IV, VI): Gaze preference left  Facial Movement (CN VII): lower weakness right lower  Motor*: Arm Left:  Paretic:  4/5, Leg Left:   Paretic:  4/5, Arm Right:   Paretic:  2/5, Leg Right:   Paretic:  2/5  Sensation: intact to light touch, temperature and vibration    NIH Stroke Scale:    Level of Consciousness: 0 - alert  LOC Questions: 2 - answers none correctly  LOC Commands: 1 - performs one correctly  Best Gaze: 1 - partial gaze palsy  Visual: 2 - complete hemianopia  Facial Palsy: 1 - minor  Motor Left Arm: 0 - no drift  Motor Right Arm: 2 - can't resist gravity  Motor Left Le - no drift  Motor Right Le - can't resist gravity  Limb Ataxia: 0 - absent  Sensory: 0 - normal  Best Language: 2 - severe aphasia  Dysarthria: 0 - normal articulation  Extinction and Inattention: 0 - no neglect  NIH Stroke Scale Total: 13      Laboratory:  CMP:     Recent Labs  Lab  03/21/17 0219 03/21/17  1424   CALCIUM 8.6*  --    ALBUMIN 2.0*  --    PROT 6.0  --    *  --    K 3.6 4.2   CO2 27  --      --    BUN 23  --    CREATININE 0.7  --    ALKPHOS 55  --    ALT 44  --    AST 63*  --    BILITOT 0.6  --      CBC:     Recent Labs  Lab 03/21/17  0219   WBC 15.85*   RBC 2.86*   HGB 9.2*   HCT 27.3*      MCV 96   MCH 32.2*   MCHC 33.7     Lipid Panel:   No results for input(s): CHOL, LDLCALC, HDL, TRIG in the last 168 hours.  Coagulation:   No results for input(s): INR, APTT in the last 168 hours.    Invalid input(s): PT  Platelet Aggregation Study: No results for input(s): PLTAGG, PLTAGINTERP, PLTAGREGLACO, ADPPLTAGGREG in the last 168 hours.  Hgb A1C:   No results for input(s): HGBA1C in the last 168 hours.  TSH:     Recent Labs  Lab 03/18/17  0204   TSH 2.079       Diagnostic Results:  I have personally reviewed  CT head. Date: 3/7/17  Temporal evolution of large left MCA territory  acute infarction with mass effect on the underlying parenchyma, sulci and left lateral ventricle with a 0.3 cm rightward midline shift.  No evidence of hydrocephalus or intracranial hemorrhage in this patient status post tPA.  Continued followup is recommended.     CTA stroke multiphase. Date: 3/7/17  -L M1 stenosis  -R vert narrowing    Echo. Date: 3/7/17  Moderately enlarged LA  CONCLUSIONS     1 - Concentric remodeling.     2 - Normal left ventricular systolic function (EF 60-65%).     3 - Right ventricular enlargement with moderately depressed systolic function.     4 - Left ventricular diastolic dysfunction.     5 - Biatrial enlargement.     6 - Mild aortic stenosis, MATEO = 1.6 cm2, peak velocity = 2.0 m/s, mean gradient = 10 mmHg.     7 - Severe tricuspid regurgitation.     8 - Pulmonary hypertension. The estimated PA systolic pressure is 102 mmHg.     9 - Increased central venous pressure.          Assessment/Plan:     81 year old female with L MCA stroke with right hemiparesis, and  aphasia. History of afib. Received tpa on 3/6/17  Discussion with NCC team regarding goals of care noted.       3/10/17 Patient was made DNR/DNI, discussed prognosis with family, patient has some movement in R side and would like to give her more time to recover before determining long term prognosis. Neurologically stable    3/11/17 Neurologically stable, more alert today    03/17/17 Patient following simple commands and had PEG placed yesterday, continued a fib with RVR being treated with metoprolol and diltiazem, also patient has PNA which is being treated with cefepime and vancomycin    03/21/17 Patient neurologically stable, plans for stepdown; DVT in left arm , picc removed      * Embolic stroke involving left middle cerebral artery  Likely cardioembolic in etiology, given recent Dx of afib not on anticoagulation.  Patient now dnr /dni     Antithrombotics for secondary stroke prevention: asa 81    Statins for secondary stroke prevention and hyperlipidemia, if present: Atorvastatin- 40 mg oral daily.     Aggressive risk factor modification: Hypertension, High Cholesterol, Diet, Exercise and Obesity, Rehab Efforts: Physical Therapy, Occupational Therapy, Speech and Language Pathology and Physical Medicine and Rehabilitation    Diagnostics: Ordered/Pending  MRI head without contrast to assess brain parenchyma    VTE Prophylaxis: heparin sq    BP Parameters: SBP <180    Nursing Orders: Neuro checks- q1h, Antiembolic stockings, Swallowing evaluation by Nursing, Out of bed BID, Avoid Mace catheter, Pneumatic compression device, Stroke Education, Blood glucose target 100-130, Up with assistance and IV Fluids: Per primary team    Family and CM working towards snf placement     Cytotoxic cerebral edema  As seen on imaging from Acute ischemic stroke - L MCA     Hypothyroidism  Stroke RF  Continue synthroid    Essential hypertension  Stroke RF  SBP goal <180  Management per primary team    Paroxysmal atrial  fibrillation  Stroke RF.  Likely etiology of patient's stroke.  Plan to start AC this admission.  Hold for now, given tPA and potential size of stroke and decision for aggressive therapy   Treatment per primary team    Hyperlipidemia  Stroke risk factor  Goal LDL <70  LDL 68 with patient on simvastatin at home  Switch to atorvastatin 40mg daily    Right flaccid hemiplegia  Due to infarct  Therapy following  Improving     Oral phase dysphagia  PEG placed 03/16/17          Elana Chi PA-C  Comprehensive Stroke Center  Department of Vascular Neurology   [facility]

## 2017-03-22 VITALS
TEMPERATURE: 98 F | HEART RATE: 62 BPM | RESPIRATION RATE: 165 BRPM | SYSTOLIC BLOOD PRESSURE: 116 MMHG | WEIGHT: 146.19 LBS | BODY MASS INDEX: 26.9 KG/M2 | DIASTOLIC BLOOD PRESSURE: 70 MMHG | HEIGHT: 62 IN | OXYGEN SATURATION: 95 %

## 2017-03-22 PROBLEM — R33.9 URINARY RETENTION: Status: ACTIVE | Noted: 2017-03-22

## 2017-03-22 PROBLEM — J18.9 PNEUMONIA: Status: ACTIVE | Noted: 2017-03-22

## 2017-03-22 LAB
ALBUMIN SERPL BCP-MCNC: 2.1 G/DL
ALP SERPL-CCNC: 60 U/L
ALT SERPL W/O P-5'-P-CCNC: 50 U/L
AMORPH CRY UR QL COMP ASSIST: ABNORMAL
ANION GAP SERPL CALC-SCNC: 7 MMOL/L
AST SERPL-CCNC: 71 U/L
BACTERIA #/AREA URNS AUTO: ABNORMAL /HPF
BASOPHILS # BLD AUTO: 0.03 K/UL
BASOPHILS NFR BLD: 0.3 %
BILIRUB SERPL-MCNC: 0.5 MG/DL
BILIRUB UR QL STRIP: NEGATIVE
BUN SERPL-MCNC: 32 MG/DL
CALCIUM SERPL-MCNC: 8.9 MG/DL
CHLORIDE SERPL-SCNC: 95 MMOL/L
CLARITY UR REFRACT.AUTO: ABNORMAL
CO2 SERPL-SCNC: 31 MMOL/L
COLOR UR AUTO: YELLOW
CREAT SERPL-MCNC: 0.8 MG/DL
DIFFERENTIAL METHOD: ABNORMAL
EOSINOPHIL # BLD AUTO: 0.3 K/UL
EOSINOPHIL NFR BLD: 2.6 %
ERYTHROCYTE [DISTWIDTH] IN BLOOD BY AUTOMATED COUNT: 13.7 %
EST. GFR  (AFRICAN AMERICAN): >60 ML/MIN/1.73 M^2
EST. GFR  (NON AFRICAN AMERICAN): >60 ML/MIN/1.73 M^2
GLUCOSE SERPL-MCNC: 107 MG/DL
GLUCOSE UR QL STRIP: NEGATIVE
HCT VFR BLD AUTO: 30 %
HGB BLD-MCNC: 9.9 G/DL
HGB UR QL STRIP: ABNORMAL
HYALINE CASTS UR QL AUTO: 1 /LPF
KETONES UR QL STRIP: NEGATIVE
LEUKOCYTE ESTERASE UR QL STRIP: ABNORMAL
LYMPHOCYTES # BLD AUTO: 1.8 K/UL
LYMPHOCYTES NFR BLD: 15.1 %
MAGNESIUM SERPL-MCNC: 2 MG/DL
MCH RBC QN AUTO: 32 PG
MCHC RBC AUTO-ENTMCNC: 33 %
MCV RBC AUTO: 97 FL
MICROSCOPIC COMMENT: ABNORMAL
MONOCYTES # BLD AUTO: 1.4 K/UL
MONOCYTES NFR BLD: 12.3 %
NEUTROPHILS # BLD AUTO: 8 K/UL
NEUTROPHILS NFR BLD: 68.8 %
NITRITE UR QL STRIP: NEGATIVE
PH UR STRIP: 7 [PH] (ref 5–8)
PHOSPHATE SERPL-MCNC: 4.2 MG/DL
PLATELET # BLD AUTO: 382 K/UL
PMV BLD AUTO: 9.8 FL
POCT GLUCOSE: 118 MG/DL (ref 70–110)
POCT GLUCOSE: 143 MG/DL (ref 70–110)
POCT GLUCOSE: 151 MG/DL (ref 70–110)
POTASSIUM SERPL-SCNC: 4.2 MMOL/L
POTASSIUM SERPL-SCNC: 4.2 MMOL/L
PROT SERPL-MCNC: 6.4 G/DL
PROT UR QL STRIP: NEGATIVE
RBC # BLD AUTO: 3.09 M/UL
RBC #/AREA URNS AUTO: 27 /HPF (ref 0–4)
SODIUM SERPL-SCNC: 133 MMOL/L
SP GR UR STRIP: 1.01 (ref 1–1.03)
URN SPEC COLLECT METH UR: ABNORMAL
UROBILINOGEN UR STRIP-ACNC: NEGATIVE EU/DL
WBC # BLD AUTO: 11.6 K/UL
WBC #/AREA URNS AUTO: 15 /HPF (ref 0–5)

## 2017-03-22 PROCEDURE — 84132 ASSAY OF SERUM POTASSIUM: CPT

## 2017-03-22 PROCEDURE — 92507 TX SP LANG VOICE COMM INDIV: CPT

## 2017-03-22 PROCEDURE — 94668 MNPJ CHEST WALL SBSQ: CPT

## 2017-03-22 PROCEDURE — 94640 AIRWAY INHALATION TREATMENT: CPT

## 2017-03-22 PROCEDURE — 97110 THERAPEUTIC EXERCISES: CPT

## 2017-03-22 PROCEDURE — 36415 COLL VENOUS BLD VENIPUNCTURE: CPT

## 2017-03-22 PROCEDURE — 97535 SELF CARE MNGMENT TRAINING: CPT

## 2017-03-22 PROCEDURE — 25000003 PHARM REV CODE 250: Performed by: PSYCHIATRY & NEUROLOGY

## 2017-03-22 PROCEDURE — 85025 COMPLETE CBC W/AUTO DIFF WBC: CPT

## 2017-03-22 PROCEDURE — 25000003 PHARM REV CODE 250: Performed by: PHYSICIAN ASSISTANT

## 2017-03-22 PROCEDURE — 92526 ORAL FUNCTION THERAPY: CPT

## 2017-03-22 PROCEDURE — 80053 COMPREHEN METABOLIC PANEL: CPT

## 2017-03-22 PROCEDURE — 83735 ASSAY OF MAGNESIUM: CPT

## 2017-03-22 PROCEDURE — 84100 ASSAY OF PHOSPHORUS: CPT

## 2017-03-22 PROCEDURE — 81001 URINALYSIS AUTO W/SCOPE: CPT

## 2017-03-22 PROCEDURE — 25000242 PHARM REV CODE 250 ALT 637 W/ HCPCS: Performed by: PHYSICIAN ASSISTANT

## 2017-03-22 PROCEDURE — 25000003 PHARM REV CODE 250: Performed by: INTERNAL MEDICINE

## 2017-03-22 PROCEDURE — 99233 SBSQ HOSP IP/OBS HIGH 50: CPT | Mod: GC,,, | Performed by: PSYCHIATRY & NEUROLOGY

## 2017-03-22 PROCEDURE — 94761 N-INVAS EAR/PLS OXIMETRY MLT: CPT

## 2017-03-22 RX ORDER — MOXIFLOXACIN HYDROCHLORIDE 400 MG/1
400 TABLET ORAL DAILY
Qty: 5 TABLET | Refills: 0 | Status: SHIPPED | OUTPATIENT
Start: 2017-03-22

## 2017-03-22 RX ORDER — MOXIFLOXACIN HYDROCHLORIDE 400 MG/1
400 TABLET ORAL DAILY
Status: DISCONTINUED | OUTPATIENT
Start: 2017-03-22 | End: 2017-03-22 | Stop reason: HOSPADM

## 2017-03-22 RX ORDER — LISINOPRIL 20 MG/1
20 TABLET ORAL DAILY
Qty: 30 TABLET | Refills: 0 | Status: SHIPPED | OUTPATIENT
Start: 2017-03-22 | End: 2018-03-22

## 2017-03-22 RX ORDER — ATORVASTATIN CALCIUM 40 MG/1
40 TABLET, FILM COATED ORAL DAILY
Qty: 30 TABLET | Refills: 0 | Status: SHIPPED | OUTPATIENT
Start: 2017-03-22

## 2017-03-22 RX ORDER — LISINOPRIL 20 MG/1
20 TABLET ORAL 2 TIMES DAILY
Status: DISCONTINUED | OUTPATIENT
Start: 2017-03-22 | End: 2017-03-22 | Stop reason: HOSPADM

## 2017-03-22 RX ADMIN — VITAMIN D, TAB 1000IU (100/BT) 1000 UNITS: 25 TAB at 09:03

## 2017-03-22 RX ADMIN — CALCIUM 500 MG: 500 TABLET ORAL at 09:03

## 2017-03-22 RX ADMIN — POLYETHYLENE GLYCOL 3350 17 G: 17 POWDER, FOR SOLUTION ORAL at 09:03

## 2017-03-22 RX ADMIN — LISINOPRIL 20 MG: 20 TABLET ORAL at 09:03

## 2017-03-22 RX ADMIN — Medication 10 ML: at 06:03

## 2017-03-22 RX ADMIN — FUROSEMIDE 40 MG: 40 TABLET ORAL at 09:03

## 2017-03-22 RX ADMIN — ASPIRIN 81 MG CHEWABLE TABLET 81 MG: 81 TABLET CHEWABLE at 09:03

## 2017-03-22 RX ADMIN — DILTIAZEM HYDROCHLORIDE 90 MG: 60 TABLET, FILM COATED ORAL at 12:03

## 2017-03-22 RX ADMIN — ATORVASTATIN CALCIUM 40 MG: 20 TABLET, FILM COATED ORAL at 09:03

## 2017-03-22 RX ADMIN — MOXIFLOXACIN HYDROCHLORIDE 400 MG: 400 TABLET, FILM COATED ORAL at 12:03

## 2017-03-22 RX ADMIN — DILTIAZEM HYDROCHLORIDE 90 MG: 60 TABLET, FILM COATED ORAL at 05:03

## 2017-03-22 RX ADMIN — IPRATROPIUM BROMIDE AND ALBUTEROL SULFATE 3 ML: .5; 3 SOLUTION RESPIRATORY (INHALATION) at 12:03

## 2017-03-22 RX ADMIN — LEVOTHYROXINE SODIUM 25 MCG: 25 TABLET ORAL at 05:03

## 2017-03-22 RX ADMIN — IPRATROPIUM BROMIDE AND ALBUTEROL SULFATE 3 ML: .5; 3 SOLUTION RESPIRATORY (INHALATION) at 07:03

## 2017-03-22 RX ADMIN — HEPARIN SODIUM 5000 UNITS: 5000 INJECTION, SOLUTION INTRAVENOUS; SUBCUTANEOUS at 05:03

## 2017-03-22 RX ADMIN — VITAMIN C 1 TABLET: TAB at 09:03

## 2017-03-22 RX ADMIN — IPRATROPIUM BROMIDE AND ALBUTEROL SULFATE 3 ML: .5; 3 SOLUTION RESPIRATORY (INHALATION) at 03:03

## 2017-03-22 RX ADMIN — STANDARDIZED SENNA CONCENTRATE AND DOCUSATE SODIUM 1 TABLET: 8.6; 5 TABLET, FILM COATED ORAL at 09:03

## 2017-03-22 RX ADMIN — HEPARIN SODIUM 5000 UNITS: 5000 INJECTION, SOLUTION INTRAVENOUS; SUBCUTANEOUS at 03:03

## 2017-03-22 NOTE — PLAN OF CARE
[facility]     Department of Hospital Medicine     [address] [place] [address]     [phone] after hours  [phone] fax       NURSING HOME ORDERS    03/22/2017    Admit to Nursing Home:      Skilled Bed                                                  Diagnoses:  Active Hospital Problems    Diagnosis  POA    *Embolic stroke involving left middle cerebral artery [I63.412]  Yes     Priority: 1 - High    Oral phase dysphagia [R13.11]  Yes     Priority: 2     Cytotoxic cerebral edema [G93.6]  Yes     Priority: 2     Right flaccid hemiplegia [G81.01]  Yes     Priority: 2     Paroxysmal atrial fibrillation [I48.0]  Yes     Priority: 3      Chronic    Essential hypertension [I10]  Yes     Priority: 4      Chronic    Pneumonia [J18.9]  Clinically Undetermined     Priority: 5     Hyperlipidemia [E78.5]  Yes     Priority: 6      Chronic    Hypothyroidism [E03.9]  Yes     Priority: 7      Chronic    Urinary retention [R33.9]  Clinically Undetermined    Acute ischemic left middle cerebral artery (MCA) stroke [I63.512]  Yes    Brain compression [G93.5]  Yes    Pleural effusion, bilateral [J90]  Yes    Pulmonary hypertension [I27.2]  Yes      Resolved Hospital Problems    Diagnosis Date Resolved POA   No resolved problems to display.       Patient is homebound due to:  Embolic stroke involving left middle cerebral artery    Allergies:  Review of patient's allergies indicates:   Allergen Reactions    Sulfa (sulfonamide antibiotics) Itching       Vitals:        Every shift (Skilled Nursing patients)    Diet:    Tube Feeding:          - Continuous at 50 mL per hour    - Flush tube with 100 mL water every 8 hours    Acitivities: as tolerated   - Up in a chair each morning as tolerated   - Ambulate with assistance to bathroom   - Scheduled walks once each shift (every 8 hours)   - May ambulate independently   - May use walker, cane, or self-propelled  wheelchair      LABS:  Per facility protocol   CMP, CBC each month for 3 months      Pre-albumin each month for 3 months      TSH every year     Nursing Precautions:     - Aspiration precautions:             - Total assistance with meals            -  Upright 90 degrees befor during and after meals             -  Suction at bedside          - Fall precautions per nursing home protocol   - Seizure precaution per long-term protocol   - Decubitus precautions:        -  for positioning   - Pressure reducing foam mattress   - Turn patient every two hours. Use wedge pillows to anchor patient    CONSULTS:     Physical Therapy to evaluate and treat     Occupational Therapy to evaluate and treat     Speech Therapy  to evaluate and treat     Nutrition to evaluate and recommend diet         MISCELLANEOUS CARE:              PEG Care:  Clean site every 24 hours     Sesay Care: Empty Sesay bag every shift.  Change Sesay every month     Routine Skin for Bedridden Patients:  Apply moisture barrier cream to all    skin folds and wet areas in perineal area daily and after baths and                           all bowel movements.    Please have patient follow up with urologist ASAP. She is being discharged with sesay in place for urinary retention. she failed a voiding trial on 3/21. Please perform daily voiding trials until urology follow up. She has a referral in place for [facility] should she need it.               Medications: Discontinue all previous medication orders, if any. See new list below.   Timothy Zena   Home Medication Instructions PITER:[id number]    Printed on:03/22/17 1211   Medication Information                      aspirin 81 MG Chew  Take 162 mg by mouth once daily.             atorvastatin (LIPITOR) 40 MG tablet  1 tablet (40 mg total) by Per G Tube route once daily.             B-complex with vitamin C (Z-BEC OR EQUIV) tablet  Take 1 tablet by mouth once daily.             calcium carbonate  (OS-LAUREN) 600 mg (1,500 mg) Tab  Take 600 mg by mouth 2 (two) times daily with meals.             diltiaZEM (CARDIZEM CD) 180 MG 24 hr capsule  Take 180 mg by mouth once daily.             fluorometholone 0.1% (FML) 0.1 % DrpS  1 drop 2 (two) times daily. Left eye             hydrochlorothiazide (HYDRODIURIL) 25 MG tablet  Take 25 mg by mouth once daily.             levothyroxine (SYNTHROID) 25 MCG tablet  Take 25 mcg by mouth once daily.             lisinopril (PRINIVIL,ZESTRIL) 20 MG tablet  1 tablet (20 mg total) by Per G Tube route once daily.             moxifloxacin (AVELOX) 400 mg tablet  1 tablet (400 mg total) by Per G Tube route once daily. End date 3/26/17             propranolol (INDERAL) 10 MG tablet  Take 10 mg by mouth 2 (two) times daily.             raloxifene (EVISTA) 60 mg tablet  Take 60 mg by mouth once daily.             rivaroxaban (XARELTO) 10 mg Tab  Take 2 tablets (20 mg total) by mouth daily with dinner or evening meal.             vitamin D 1000 units Tab  Take 185 mg by mouth once daily.                     _________________________________  Paty Carrillo MD  03/22/2017

## 2017-03-22 NOTE — ASSESSMENT & PLAN NOTE
Stroke risk factor  Goal LDL <70  LDL 68 with patient on simvastatin at home  Switched to atorvastatin 40mg daily

## 2017-03-22 NOTE — PT/OT/SLP DISCHARGE
Physical Therapy Discharge Summary    Zena Aguirre  MRN: [id number]   Embolic stroke involving left middle cerebral artery   Patient Discharged from acute Physical Therapy on 3/22/17.  Please refer to prior PT noted date on 3/16/17 for functional status.     Assessment:   Patient appropriate for care in another setting.  GOALS:   Physical Therapy Goals        Problem: Physical Therapy Goal    Goal Priority Disciplines Outcome Goal Variances Interventions   Physical Therapy Goal     PT/OT, PT Ongoing (interventions implemented as appropriate)     Description:  Goals to be met by: 3/21/17     Patient will increase functional independence with mobility by performing:    Supine to sit with Moderate Assistance.  Sit to supine with Moderate Assistance.   Sit to stand transfer with Moderate Assistance using No Assistive Device.  Bed to chair transfer via Squat Pivot with Moderate Assistance using No Assistive Device.  Static sitting at edge of bed x 15 minutes with BUE support and BLE support with Minimal Assistance.  Able to tolerate exercise for 15-20 reps with assistance as needed.  Patient and family able to teachback stroke & positioning education independently.                Reasons for Discontinuation of Therapy Services  Transfer to alternate level of care.      Plan:  Patient Discharged to: Skilled Nursing Facility.  Josee Cain PT, DPT  3/22/2017  [phone]

## 2017-03-22 NOTE — PT/OT/SLP PROGRESS
Speech Language Pathology  Treatment    Zena Aguirre   MRN: [id number]   Admitting Diagnosis: Embolic stroke involving left middle cerebral artery    Diet recommendations: Solid Diet Level: NPO  Liquid Diet Level: NPO PEG tube    SLP Treatment Date: 17  Speech Start Time: 827     Speech Stop Time: 855     Speech Total (min): 28 min       TREATMENT BILLABLE MINUTES:  Speech Therapy Individual 20 and Treatment Swallowing Dysfunction 8    Has the patient been evaluated by SLP for swallowing? : Yes  Keep patient NPO?: Yes   General Precautions: Standard, aphasia, aspiration, fall, NPO  Current Respiratory Status: other (comment) (room air)       Subjective:  Pt vocalized, but no intelligible verbal output.    Pain Ratin/10                   Objective:      Pt followed limited commands (modeled closing eyes x 1, lifted arm with model plus cues, and opened mouth with model plus cues).  Groping movements noted when trying to follow model for oral movements.  Pt did not respond to any simple yes/no q's.  She did not vocalize or verbalize upon command, but did begin to vocalize when medical student attempted to replace sock after completing neuro exam. Pt appeared to be communicated that she did not want her sock to be put back on.  Pt accepted ice chip x 1 with difficulty spoon stripping, 1/3 tsp thin water x 1, 1/2 tsp thin water x 1, full tsp thin water x 1, and full tsp nectar thick water x 2. Coughing present after full tsp thin water trial.  No further PO trials given as resident MD arrived to examine pt.       Assessment:   Zena Aguirre is a 81 y.o. female with a medical diagnosis of Embolic stroke involving left middle cerebral artery and presents with aphasia, apraxia, dysarthria, and dysphagia.     Discharge recommendations: Discharge Facility/Level Of Care Needs: nursing facility, skilled     Goals:   SLP Goals        Problem: SLP Goal    Goal Priority Disciplines Outcome   SLP Goal     SLP Ongoing  (interventions implemented as appropriate)   Description:  Updated Speech Language Pathology Goals expected to be met 3/29:  1. Pt will participate in ongoing swallowing assessment to determine if safe to begin PO intake.  2. Pt will vocalize x3 during session.  3. Pt will follow simple, one step commands given max cues with 50% acc to improve overall receptive language  4. Pt will answer simple y/n questions in any modality on 1/5 trials to improve overall receptive language skills  5. Pt will identify objects in FO2 via eye gaze with 60% acc to improve overall receptive language skills.    Goals expected to be met by 3/22  1. Pt will participate in ongoing swallowing assessment to determine if safe to begin PO intake. ongoing  2. Pt will vocalize x1 during session. Goal met  3. Pt will follow simple, one step commands given max cues with 70% acc to improve overall receptive language. Goal not met.  4. Pt will answer simple y/n questions in any modality with 70% acc to improve overall receptive language skills. Goal not met  5. Pt will identify objects in FO2 via eye gaze with 60% acc to improve overall receptive language skills. Goal not met      Speech Language Pathology Goals  Goals expected to be met by 3/14:  1. Pt will participate in ongoing swallowing assessment to determine if safe to begin PO intake.  2. Pt will participate in speech/language evaluation to determine further goals.                      Plan:   Patient to be seen Therapy Frequency: 5 x/week   Plan of Care expires: 04/05/17  Plan of Care reviewed with: patient, spouse  SLP Follow-up?: Yes              SAMINA Guzman, CCC-SLP  03/22/2017    SAMINA Guzman, CCC-SLP  Speech Language Pathologist  [phone]  3/22/2017

## 2017-03-22 NOTE — PT/OT/SLP PROGRESS
Physical Therapy      Zena Aguirre  MRN: [id number]    Patient not seen today secondary to Other (Comment) (PT orders discontinued 3/16/17 and resumed 3/22/17. PT attempted to see pt at 1140, pt with multiple visitors. PT returned to see pt at 1530, pt preparing for discharge to SNF, d/c at 1600pm.).     Josee Cain PT, DPT  3/22/2017   [phone]

## 2017-03-22 NOTE — ASSESSMENT & PLAN NOTE
Likely cardioembolic in etiology, given recent Dx of afib not on anticoagulation.  Patient now dnr /dni     Antithrombotics for secondary stroke prevention: asa 8; start xarelto today    Statins for secondary stroke prevention and hyperlipidemia, if present: Atorvastatin- 40 mg oral daily.     Aggressive risk factor modification: Hypertension, High Cholesterol, Diet, Exercise and Obesity, Rehab Efforts: Physical Therapy, Occupational Therapy, Speech and Language Pathology and Physical Medicine and Rehabilitation    Diagnostics: Ordered/Pending  MRI head without contrast to assess brain parenchyma    VTE Prophylaxis: heparin sq    BP Parameters: SBP <180    Nursing Orders: Neuro checks- q1h, Antiembolic stockings, Swallowing evaluation by Nursing, Out of bed BID, Avoid Mace catheter, Pneumatic compression device, Stroke Education, Blood glucose target 100-130, Up with assistance and IV Fluids: Per primary team    Family and CM working towards snf placement

## 2017-03-22 NOTE — ASSESSMENT & PLAN NOTE
Sesay in place. Failed voiding trial. Discharged with sesay in place with referral to urology

## 2017-03-22 NOTE — NURSING
HR sustaining in the 130s just prior to scheduled diltiazem admin. Given via PEG, will reassess need for further intervention.

## 2017-03-22 NOTE — PROGRESS NOTES
[facility]  Vascular Neurology  Comprehensive Stroke Center  Progress Note      Neurologic Chief Complaint: L MCA stroke    Subjective:     Interval History: Patient is seen for follow-up neurological assessment and treatment recommendations: HUNTER ELIZABETH, Past Medical, Family, and Social History remains the same as documented in the initial encounter.     Review of Systems   Neurological: Positive for facial asymmetry, speech difficulty and weakness.     Scheduled Meds:   albuterol-ipratropium 2.5mg-0.5mg/3mL  3 mL Nebulization Q8H    aspirin  81 mg Per G Tube Daily    atorvastatin  40 mg Per G Tube Daily    B-complex with vitamin C  1 tablet Per G Tube Daily    calcium carbonate  500 mg Per G Tube BID WM    diltiaZEM  90 mg Per G Tube Q6H    furosemide  40 mg Per G Tube BID    heparin (porcine)  5,000 Units Subcutaneous Q8H    levothyroxine  25 mcg Per G Tube Before breakfast    lisinopril  20 mg Per G Tube BID    moxifloxacin  400 mg Per G Tube Daily    polyethylene glycol  17 g Per G Tube Daily    senna-docusate 8.6-50 mg  1 tablet Per G Tube BID    sodium chloride 0.9%  3 mL Intravenous Q8H    vitamin D  1,000 Units Per G Tube Daily     Continuous Infusions:   PRN Meds:acetaminophen, dextrose 50%, glucagon (human recombinant), flu vacc [id number] 65yr up(PF), insulin aspart, midazolam (PF), pneumoc 13-evette conj-dip cr(PF)    Objective:     Vital Signs (Most Recent):  Temp: 97.6 °F (36.4 °C) (03/22/17 0800)  Pulse: 64 (03/22/17 1100)  Resp: 20 (03/22/17 0800)  BP: 124/60 (03/22/17 0800)  SpO2: 96 % (03/22/17 0800)  BP Location: Left arm    Vital Signs Range (Last 24H):  Temp:  [97.1 °F (36.2 °C)-98.9 °F (37.2 °C)]   Pulse:  []   Resp:  [12-22]   BP: (117-158)/(56-82)   SpO2:  [94 %-98 %]   BP Location: Left arm    Physical Exam   Constitutional: She appears well-developed and well-nourished.   HENT:   Head: Normocephalic.   Eyes: Pupils are equal, round, and reactive to  light.   Cardiovascular: Normal rate.    afib   Pulmonary/Chest: Effort normal. No respiratory distress.   Abdominal: Soft. She exhibits no distension.   Neurological: She is alert.   Drowsy   Skin: Skin is warm and dry.   Nursing note and vitals reviewed.      Neurological Exam:   LOC: drowsy and follows simple commands  Language: Expressive aphasia  Speech: Aphasic   Orientation: Aphasic  EOM (CN III, IV, VI): Gaze preference left  Facial Movement (CN VII): lower weakness right lower  Motor*: Arm Left: Paretic: 4/5, Leg Left: Paretic: 4/5, Arm Right: Paretic: 2/5, Leg Right: Paretic: 2/5  Sensation: intact to light touch, temperature and vibration    NIH Stroke Scale:    Level of Consciousness: 0 - alert  LOC Questions: 2 - answers none correctly  LOC Commands: 1 - performs one correctly  Best Gaze: 1 - partial gaze palsy  Visual: 2 - complete hemianopia  Facial Palsy: 1 - minor  Motor Left Arm: 0 - no drift  Motor Right Arm: 2 - can't resist gravity  Motor Left Le - no drift  Motor Right Le - can't resist gravity  Limb Ataxia: 0 - absent  Sensory: 0 - normal  Best Language: 2 - severe aphasia  Dysarthria: 0 - normal articulation  Extinction and Inattention: 0 - no neglect  NIH Stroke Scale Total: 13      Laboratory:  CMP:   Recent Labs  Lab 17  0632   CALCIUM 8.9   ALBUMIN 2.1*   PROT 6.4   *   K 4.2  4.2   CO2 31*   CL 95   BUN 32*   CREATININE 0.8   ALKPHOS 60   ALT 50*   AST 71*   BILITOT 0.5     CBC:   Recent Labs  Lab 17  0632   WBC 11.60   RBC 3.09*   HGB 9.9*   HCT 30.0*   *   MCV 97   MCH 32.0*   MCHC 33.0       Diagnostic Results:  I have personally reviewed  CT head. Date: 3/7/17  Temporal evolution of large left MCA territory  acute infarction with mass effect on the underlying parenchyma, sulci and left lateral ventricle with a 0.3 cm rightward midline shift.  No evidence of hydrocephalus or intracranial hemorrhage in this patient status post tPA.  Continued followup  is recommended.      CTA stroke multiphase. Date: 3/7/17  -L M1 stenosis  -R vert narrowing     Echo. Date: 3/7/17  Moderately enlarged LA  CONCLUSIONS     1 - Concentric remodeling.     2 - Normal left ventricular systolic function (EF 60-65%).     3 - Right ventricular enlargement with moderately depressed systolic function.     4 - Left ventricular diastolic dysfunction.     5 - Biatrial enlargement.     6 - Mild aortic stenosis, MATEO = 1.6 cm2, peak velocity = 2.0 m/s, mean gradient = 10 mmHg.     7 - Severe tricuspid regurgitation.     8 - Pulmonary hypertension. The estimated PA systolic pressure is 102 mmHg.     9 - Increased central venous pressure.     Assessment/Plan:     81 year old female with L MCA stroke with right hemiparesis, and aphasia. History of afib. Received tpa on 3/6/17  Discussion with NCC team regarding goals of care noted.       3/10/17 Patient was made DNR/DNI, discussed prognosis with family, patient has some movement in R side and would like to give her more time to recover before determining long term prognosis. Neurologically stable    3/11/17 Neurologically stable, more alert today    03/17/17 Patient following simple commands and had PEG placed yesterday, continued a fib with RVR being treated with metoprolol and diltiazem, also patient has PNA which is being treated with cefepime and vancomycin    03/21/17 Patient neurologically stable, plans for stepdown; DVT in left arm , picc removed    3/22/17: transition from broad spectrum abx to moxifloxacin for 5 days      * Embolic stroke involving left middle cerebral artery  Likely cardioembolic in etiology, given recent Dx of afib not on anticoagulation.  Patient now dnr /dni     Antithrombotics for secondary stroke prevention: asa 8; start xarelto today    Statins for secondary stroke prevention and hyperlipidemia, if present: Atorvastatin- 40 mg oral daily.     Aggressive risk factor modification: Hypertension, High Cholesterol, Diet,  Exercise and Obesity, Rehab Efforts: Physical Therapy, Occupational Therapy, Speech and Language Pathology and Physical Medicine and Rehabilitation    Diagnostics: Ordered/Pending  MRI head without contrast to assess brain parenchyma    VTE Prophylaxis: heparin sq    BP Parameters: SBP <180    Nursing Orders: Neuro checks- q1h, Antiembolic stockings, Swallowing evaluation by Nursing, Out of bed BID, Avoid Mace catheter, Pneumatic compression device, Stroke Education, Blood glucose target 100-130, Up with assistance and IV Fluids: Per primary team    Family and CM working towards snf placement     Cytotoxic cerebral edema  As seen on imaging from Acute ischemic stroke - L MCA     Right flaccid hemiplegia  Due to infarct  Therapy following  Improving     Oral phase dysphagia  PEG placed 03/16/17    Paroxysmal atrial fibrillation  Stroke RF.  Likely etiology of patient's stroke.  Plan to start AC this admission.  Hold for now, given tPA and potential size of stroke and decision for aggressive therapy   Start ASA  Will start xarelto at time of discharge    Essential hypertension  Stroke RF  SBP goal <180  Lisinopril 20 BID, diltiazem 90 mg q6    Pneumonia  Pt received cefepime x9 days (missed one day) and vanc  Now afebrile with normal WBC so will de-escalate to moxifloxacin for 5 day course    Hyperlipidemia  Stroke risk factor  Goal LDL <70  LDL 68 with patient on simvastatin at home  Switch to atorvastatin 40mg daily    Hypothyroidism  Stroke RF  Continue synthroid    Urinary retention  Mace in place  Failed voiding trial yesterday  Voiding trial again today  Will need urology follow up if not voiding on own at time of discharge      Paty Carrillo MD  Comprehensive Stroke Center  Department of Vascular Neurology   [facility]

## 2017-03-22 NOTE — PLAN OF CARE
MASHA received call from MASHA Garcia with [facility][1] regarding discharge planning and dates. Advised Pt has stepped down to the floor and may be ready by tomorrow. They reported that would be fine. Advised her to contact covering CM to obtain more details of discharge. She asked for updated notes which this MASHA sent via [employer].    Gilma Mary LMSW  Neurocritical Care   [facility]  [id number]

## 2017-03-22 NOTE — PLAN OF CARE
Problem: Patient Care Overview  Goal: Plan of Care Review  POC including fall prevention measures reviewed with spouse. No acute changes overnight. Tele, PIV, SCDs, PEG tube with TF in place. Abx cont for PNA.Per  two voiding trials resulting in replacement of sesay for retention; as patient is transferring primary teams overnight will pursue another voiding trial discussion with team in am. Pt nonverbal but following some commands. Telemetry Afib with brief RVR (prior to med admin) and an event of aflutter overnight, otherwise BP stable, afebrile, and tolerating RA. Turn Q 2 and oral care provided. Flaccid RUE edema improved with positioning.  at bedside. WCM

## 2017-03-22 NOTE — SUBJECTIVE & OBJECTIVE
Neurologic Chief Complaint: L MCA stroke    Subjective:     Interval History: Patient is seen for follow-up neurological assessment and treatment recommendations: HUNTER    HPI, Past Medical, Family, and Social History remains the same as documented in the initial encounter.     Review of Systems   Neurological: Positive for facial asymmetry, speech difficulty and weakness.     Scheduled Meds:   albuterol-ipratropium 2.5mg-0.5mg/3mL  3 mL Nebulization Q8H    aspirin  81 mg Per G Tube Daily    atorvastatin  40 mg Per G Tube Daily    B-complex with vitamin C  1 tablet Per G Tube Daily    calcium carbonate  500 mg Per G Tube BID WM    diltiaZEM  90 mg Per G Tube Q6H    furosemide  40 mg Per G Tube BID    heparin (porcine)  5,000 Units Subcutaneous Q8H    levothyroxine  25 mcg Per G Tube Before breakfast    lisinopril  20 mg Per G Tube BID    moxifloxacin  400 mg Per G Tube Daily    polyethylene glycol  17 g Per G Tube Daily    senna-docusate 8.6-50 mg  1 tablet Per G Tube BID    sodium chloride 0.9%  3 mL Intravenous Q8H    vitamin D  1,000 Units Per G Tube Daily     Continuous Infusions:   PRN Meds:acetaminophen, dextrose 50%, glucagon (human recombinant), flu vacc [id number] 65yr up(PF), insulin aspart, midazolam (PF), pneumoc 13-evette conj-dip cr(PF)    Objective:     Vital Signs (Most Recent):  Temp: 97.6 °F (36.4 °C) (03/22/17 0800)  Pulse: 64 (03/22/17 1100)  Resp: 20 (03/22/17 0800)  BP: 124/60 (03/22/17 0800)  SpO2: 96 % (03/22/17 0800)  BP Location: Left arm    Vital Signs Range (Last 24H):  Temp:  [97.1 °F (36.2 °C)-98.9 °F (37.2 °C)]   Pulse:  []   Resp:  [12-22]   BP: (117-158)/(56-82)   SpO2:  [94 %-98 %]   BP Location: Left arm    Physical Exam   Constitutional: She appears well-developed and well-nourished.   HENT:   Head: Normocephalic.   Eyes: Pupils are equal, round, and reactive to light.   Cardiovascular: Normal rate.    afib   Pulmonary/Chest: Effort normal. No respiratory  distress.   Abdominal: Soft. She exhibits no distension.   Neurological: She is alert.   Drowsy   Skin: Skin is warm and dry.   Nursing note and vitals reviewed.      Neurological Exam:   LOC: drowsy and follows simple commands  Language: Expressive aphasia  Speech: Aphasic   Orientation: Aphasic  EOM (CN III, IV, VI): Gaze preference left  Facial Movement (CN VII): lower weakness right lower  Motor*: Arm Left: Paretic: 4/5, Leg Left: Paretic: 4/5, Arm Right: Paretic: 2/5, Leg Right: Paretic: 2/5  Sensation: intact to light touch, temperature and vibration    NIH Stroke Scale:    Level of Consciousness: 0 - alert  LOC Questions: 2 - answers none correctly  LOC Commands: 1 - performs one correctly  Best Gaze: 1 - partial gaze palsy  Visual: 2 - complete hemianopia  Facial Palsy: 1 - minor  Motor Left Arm: 0 - no drift  Motor Right Arm: 2 - can't resist gravity  Motor Left Le - no drift  Motor Right Le - can't resist gravity  Limb Ataxia: 0 - absent  Sensory: 0 - normal  Best Language: 2 - severe aphasia  Dysarthria: 0 - normal articulation  Extinction and Inattention: 0 - no neglect  NIH Stroke Scale Total: 13      Laboratory:  CMP:   Recent Labs  Lab 17  0632   CALCIUM 8.9   ALBUMIN 2.1*   PROT 6.4   *   K 4.2  4.2   CO2 31*   CL 95   BUN 32*   CREATININE 0.8   ALKPHOS 60   ALT 50*   AST 71*   BILITOT 0.5     CBC:   Recent Labs  Lab 17  0632   WBC 11.60   RBC 3.09*   HGB 9.9*   HCT 30.0*   *   MCV 97   MCH 32.0*   MCHC 33.0       Diagnostic Results:  I have personally reviewed  CT head. Date: 3/7/17  Temporal evolution of large left MCA territory  acute infarction with mass effect on the underlying parenchyma, sulci and left lateral ventricle with a 0.3 cm rightward midline shift.  No evidence of hydrocephalus or intracranial hemorrhage in this patient status post tPA.  Continued followup is recommended.      CTA stroke multiphase. Date: 3/7/17  -L M1 stenosis  -R vert  narrowing     Echo. Date: 3/7/17  Moderately enlarged LA  CONCLUSIONS     1 - Concentric remodeling.     2 - Normal left ventricular systolic function (EF 60-65%).     3 - Right ventricular enlargement with moderately depressed systolic function.     4 - Left ventricular diastolic dysfunction.     5 - Biatrial enlargement.     6 - Mild aortic stenosis, MATEO = 1.6 cm2, peak velocity = 2.0 m/s, mean gradient = 10 mmHg.     7 - Severe tricuspid regurgitation.     8 - Pulmonary hypertension. The estimated PA systolic pressure is 102 mmHg.     9 - Increased central venous pressure.

## 2017-03-22 NOTE — SUBJECTIVE & OBJECTIVE
NIH Stroke Scale:    Level of Consciousness: 0 - alert  LOC Questions: 2 - answers none correctly  LOC Commands: 1 - performs one correctly  Best Gaze: 1 - partial gaze palsy  Visual: 2 - complete hemianopia  Facial Palsy: 1 - minor  Motor Left Arm: 0 - no drift  Motor Right Arm: 2 - can't resist gravity  Motor Left Le - no drift  Motor Right Le - can't resist gravity  Limb Ataxia: 0 - absent  Sensory: 0 - normal  Best Language: 2 - severe aphasia  Dysarthria: 0 - normal articulation  Extinction and Inattention: 0 - no neglect  NIH Stroke Scale Total: 13

## 2017-03-22 NOTE — PLAN OF CARE
Patient ready to discharge to [facility] ([phone]), notified Rene with stroke team to obtain orders, will follow.    1252 Sent orders, will follow and update.

## 2017-03-22 NOTE — DISCHARGE SUMMARY
[facility]  Vascular Neurology  Comprehensive Stroke Center  Discharge Summary     Summary:     Admit Date: 3/7/2017  1:28 AM    Discharge Date and Time:3/22/17    Attending Physician: No att. providers found     Discharge Provider: Paty Carrillo MD    History of Present Illness: Ms. Aguirre is an 80 yo F with a h/o HLD, HTN, hypothyroidism, and newly diagnosed afib (not yet on AC) who presented from an OSH with acute R sided weakness and decreased level of consciousness.  LNN 7pm.  She was evaluated via telestroke, found to have an NIHSS of 25, given tPA, and transferred to [facility] for possible thrombectomy.  A decision not to intervene was made at 2:10am.  No clear triggers.      Hospital Course (synopsis of major diagnoses, care, treatment, and services provided during the course of the hospital stay): Patient was started on risk factor modification- aspirin, statin, blood pressure control. PT/OT/ SLP worked with patient throughout her admission. Her neurologic status improved. She required PEG placement 3/16. She is tolerating tube feeds.  She was started on xarelto for afib at the time of discharge.           NIH Stroke Scale:    Level of Consciousness: 0 - alert  LOC Questions: 2 - answers none correctly  LOC Commands: 1 - performs one correctly  Best Gaze: 1 - partial gaze palsy  Visual: 2 - complete hemianopia  Facial Palsy: 1 - minor  Motor Left Arm: 0 - no drift  Motor Right Arm: 2 - can't resist gravity  Motor Left Le - no drift  Motor Right Le - can't resist gravity  Limb Ataxia: 0 - absent  Sensory: 0 - normal  Best Language: 2 - severe aphasia  Dysarthria: 0 - normal articulation  Extinction and Inattention: 0 - no neglect  NIH Stroke Scale Total: 13        Assessment/Plan:     Interventions: IV t-PA (at OSH)    Complications: None    Research Candidate?:  No-->     Neurological deficit at discharge: Aphasia, R sided weakness of RUE and RLE     Disposition: Home or Self  Care    Final Active Diagnoses:    Diagnosis Date Noted POA    PRINCIPAL PROBLEM:  Embolic stroke involving left middle cerebral artery [I63.412] 03/07/2017 Yes    Oral phase dysphagia [R13.11] 03/20/2017 Yes    Cytotoxic cerebral edema [G93.6] 03/09/2017 Yes    Right flaccid hemiplegia [G81.01] 03/08/2017 Yes    Paroxysmal atrial fibrillation [I48.0] 03/07/2017 Yes     Chronic    Essential hypertension [I10] 03/07/2017 Yes     Chronic    Pneumonia [J18.9] 03/22/2017 Clinically Undetermined    Hyperlipidemia [E78.5] 03/07/2017 Yes     Chronic    Hypothyroidism [E03.9] 03/07/2017 Yes     Chronic    Urinary retention [R33.9] 03/22/2017 Clinically Undetermined    Acute ischemic left middle cerebral artery (MCA) stroke [I63.512] 03/21/2017 Yes    Brain compression [G93.5] 03/10/2017 Yes    Pleural effusion, bilateral [J90] 03/08/2017 Yes    Pulmonary hypertension [I27.2] 03/08/2017 Yes      Problems Resolved During this Admission:    Diagnosis Date Noted Date Resolved POA     Oral phase dysphagia  PEG placed 03/16/17    Paroxysmal atrial fibrillation  - Likely etiology of patient's stroke. Rate controlled with diltiazem and metoprolol during her stay she is discharged on home dose diltiazem and new Rx for xarelto.      Essential hypertension  Her SBP goal <180. Controlled with Lisinopril 20 BID, diltiazem 90 mg q6 while in patient.       Pneumonia  Pt received cefepime x9 days (missed one day) and vanc  Now afebrile with normal WBC so will de-escalate to moxifloxacin for 5 day course    Hyperlipidemia  Stroke risk factor  Goal LDL <70  LDL 68 with patient on simvastatin at home  Switched to atorvastatin 40mg daily    Hypothyroidism  Stroke RF  Continue synthroid    Urinary retention  Sesay in place. Failed voiding trial. Discharged with sesay in place with referral to urology           Recommendations:     Post-discharge complication risks: None    Stroke Education given to: patient, family and  caregiver    Follow-up in Stroke Clinic in 28 days    Discharge Plan:  Antithrombotics: Aspirin 81  Statin: Atorvastatin 40 mg  Antocoagulant: xarelto 20 mg daily  Aggresive risk factor modification:  Hypertension, High Cholesterol and Atrial Fibrillation    Follow Up:  Follow-up Information     Follow up with Elana Barton PA-C On 4/26/2017.    Specialty:  Neurology    Why:  @ 7:40    Contact information:    [address]  [facility] [address]  [phone]          Follow up with [facility] VASCULAR NEUROLOGY In 4 weeks.    Specialty:  Vascular Neurology    Contact information:    [address]  [facility] [address]  [phone]        Follow up with [facility].    Contact information:    [place] [place] [address]  Phone: [phone]        Patient Instructions:     Ambulatory Referral to Urology   Referral Priority: Routine Referral Type: Consultation   Referral Reason: Specialty Services Required    Requested Specialty: Urology    Number of Visits Requested: 1      Activity as tolerated     Call MD for:  temperature >100.4     Call MD for:  difficulty breathing or increased cough     Call MD for:  persistent dizziness, light-headedness, or visual disturbances     Call MD for:  increased confusion or weakness       Medications:  Reconciled Home Medications:   Discharge Medication List as of 3/22/2017  4:24 PM      START taking these medications    Details   atorvastatin (LIPITOR) 40 MG tablet 1 tablet (40 mg total) by Per G Tube route once daily., Starting 3/22/2017, Until Discontinued, Normal      lisinopril (PRINIVIL,ZESTRIL) 20 MG tablet 1 tablet (20 mg total) by Per G Tube route once daily., Starting 3/22/2017, Until Thu 3/22/18, Normal      moxifloxacin (AVELOX) 400 mg tablet 1 tablet (400 mg total) by Per G Tube route once daily., Starting 3/22/2017, Until Discontinued, Normal      rivaroxaban (XARELTO) 10 mg Tab Take 2 tablets (20 mg total) by mouth daily with dinner  or evening meal., Starting 3/22/2017, Until Thu 3/22/18, Normal         CONTINUE these medications which have NOT CHANGED    Details   aspirin 81 MG Chew Take 162 mg by mouth once daily., Until Discontinued, Historical Med      B-complex with vitamin C (Z-BEC OR EQUIV) tablet Take 1 tablet by mouth once daily., Until Discontinued, Historical Med      calcium carbonate (OS-LAUREN) 600 mg (1,500 mg) Tab Take 600 mg by mouth 2 (two) times daily with meals., Until Discontinued, Historical Med      diltiaZEM (CARDIZEM CD) 180 MG 24 hr capsule Take 180 mg by mouth once daily., Until Discontinued, Historical Med      fluorometholone 0.1% (FML) 0.1 % DrpS 1 drop 2 (two) times daily. Left eye, Until Discontinued, Historical Med      hydrochlorothiazide (HYDRODIURIL) 25 MG tablet Take 25 mg by mouth once daily., Until Discontinued, Historical Med      levothyroxine (SYNTHROID) 25 MCG tablet Take 25 mcg by mouth once daily., Until Discontinued, Historical Med      propranolol (INDERAL) 10 MG tablet Take 10 mg by mouth 2 (two) times daily., Until Discontinued, Historical Med      raloxifene (EVISTA) 60 mg tablet Take 60 mg by mouth once daily., Until Discontinued, Historical Med      vitamin D 1000 units Tab Take 185 mg by mouth once daily., Until Discontinued, Historical Med         STOP taking these medications       simvastatin (ZOCOR) 40 MG tablet Comments:   Reason for Stopping:               Paty Carrillo MD  Comprehensive Stroke Center  Department of Vascular Neurology   [facility]

## 2017-03-22 NOTE — ASSESSMENT & PLAN NOTE
Stroke RF.  Likely etiology of patient's stroke.  Plan to start AC this admission.  Hold for now, given tPA and potential size of stroke and decision for aggressive therapy   Start ASA  Will start xarelto at time of discharge

## 2017-03-22 NOTE — PLAN OF CARE
Problem: Fall Risk (Adult)  Goal: Identify Related Risk Factors and Signs and Symptoms  Related risk factors and signs and symptoms are identified upon initiation of Human Response Clinical Practice Guideline (CPG)   Outcome: Ongoing (interventions implemented as appropriate)  Pt will remain free of falls during hospital stay.

## 2017-03-22 NOTE — ASSESSMENT & PLAN NOTE
- Likely etiology of patient's stroke. Rate controlled with diltiazem and metoprolol during her stay she is discharged on home dose diltiazem and new Rx for xarelto.

## 2017-03-22 NOTE — PT/OT/SLP PROGRESS
Occupational Therapy  Treatment    Zena Aguirre   MRN: [id number]   Admitting Diagnosis: Embolic stroke involving left middle cerebral artery    OT Date of Treatment: 17   OT Start Time: 859  OT Stop Time: 930  OT Total Time (min): 31 min    Billable Minutes:  Self Care/Home Management 15 and Therapeutic Exercise 16    General Precautions: Standard, aspiration, fall, NPO (DNR, cardiac)    Do you have any cultural, spiritual, Moravian conflicts, given your current situation?: Worship    Subjective:  Communicated with RN prior to session.  Pt's spouse & son present off & on during session.  Pt with 2-3 attempts to verbalize during session.  Pt nodded head no appropriately to 2-4 questions during session.    Pain Ratin/10  Pain Rating Post-Intervention: 0/10    Objective:  Patient found with: telemetry, peripheral IV, sesay catheter (PEG tube)     Functional Mobility:  Bed Mobility:  Rolling/Turning to Left: Total assistance  Rolling/Turning Right: Maximum assistance  Scooting/Bridging: Total Assistance (scooting forward on EOB & up HOB to the right while seated EOB)  Supine to Sit: Total Assistance  Sit to Supine: Total Assistance    Activities of Daily Living:     Grooming Position: Seated, EOB  Grooming Level of Assistance: Total assistance (hand over hand (A) required as well as (A) for postural control)     Therapeutic Activities and Exercises:  Pt required Min-Max (A) with postural control while seated EOB with (A) due to posterior & rightward leaning.  Provided (A) to decrease pushing with LUE while seated EOB.  Provided verbal & physical (A) to facilitate postural control while seated EOB.  Pt performed reaching activities with hand over hand (A) while seated EOB to facilitate attention to the right & postural control.  Provided RUE weight bearing while seated EOB.  Provided PROM to RUE & RLE in all planes x 10 reps each while supine.  Pt's family had no further questions & when asked whether there  were any concerns pt's family reported none.      AM-PAC 6 CLICK ADL   How much help from another person does this patient currently need?   1 = Unable, Total/Dependent Assistance  2 = A lot, Maximum/Moderate Assistance  3 = A little, Minimum/Contact Guard/Supervision  4 = None, Modified [place]/Independent    Putting on and taking off regular lower body clothing? : 1  Bathing (including washing, rinsing, drying)?: 1  Toileting, which includes using toilet, bedpan, or urinal? : 1  Putting on and taking off regular upper body clothing?: 1  Taking care of personal grooming such as brushing teeth?: 1  Eating meals?: 1  Total Score: 6     AM-PAC Raw Score CMS G-Code Modifier Level of Impairment Assistance   6 % Total / Unable   7 - 9 CM 80 - 100% Maximal Assist   10 - 14 CL 60 - 80% Moderate Assist   15 - 19 CK 40 - 60% Moderate Assist   20 - 22 CJ 20 - 40% Minimal Assist   23 CI 1-20% SBA / CGA   24 CH 0% Independent/ Mod I     Patient left supine with all lines intact, call button in reach, bed alarm on, RN notified, spouse & son present and white board updated.    ASSESSMENT:  Zena Aguirre is a 81 y.o. female with a medical diagnosis of Embolic stroke involving left middle cerebral artery and presents with fair participation & motivation.  Pt with continued improvements in attempts at speaking/responding to questions during session.  Pt continues to demonstrate deficits in bed mobility, vision, sensation, postural control, ROM, strength, ADL's, & transfers.    Rehab identified problem list/impairments: Rehab identified problem list/impairments: weakness, impaired endurance, impaired sensation, impaired self care skills, impaired functional mobilty, decreased coordination, impaired cognition, visual deficits, impaired balance, decreased upper extremity function, decreased lower extremity function, decreased safety awareness    Rehab potential is fair.    Activity tolerance: Fair    Discharge  recommendations: Discharge Facility/Level Of Care Needs: nursing facility, skilled     GOALS:   Occupational Therapy Goals        Problem: Occupational Therapy Goal    Goal Priority Disciplines Outcome Interventions   Occupational Therapy Goal     OT, PT/OT Ongoing (interventions implemented as appropriate)    Description:  Goals set 3/18 to be addressed for 14 days with expiration date, 4/1:  Patient will increase functional independence with ADLs by performing:    Patient will demonstrate rolling to the right with min assist.  Not met   Patient will demonstrate rolling to the left with mod assist.   Not met  Patient will demonstrate supine -sit with mod assist.   Not met  Patient will demonstrate stand pivot transfers with mod assist.   Not met  Patient will demonstrate grooming while standing with mod assist.   Not met  Patient will demonstrate upper body dressing with mod assist while seated EOB.   Not met  Patient will demonstrate lower body dressing with max assist while seated EOB.   Not met  Patient will demonstrate toileting with max assist.   Not met  Patient will demonstrate bathing while seated EOB with max assist.   Not met  Patient will demonstrate ability to follow 3/4 commands.   Not met  atient's family / caregiver will demonstrate independence and safety with assisting patient with self-care skills and functional mobility.     Not met  Patient's family / caregiver will demonstrate independence with providing ROM and changes in bed positioning.   Not met  Patient and/or patient's family will verbalize understanding of stroke prevention guidelines, personal risk factors and stroke warning signs via teachback method.  Not met                       Plan:  Patient to be seen 5 x/week to address the above listed problems via self-care/home management, sensory integration, therapeutic activities, therapeutic exercises, neuromuscular re-education, cognitive retraining  Plan of Care expires: 04/06/17  Plan  of Care reviewed with: patient, spouse, son         Ann VERONA Bolden  03/22/2017

## 2017-03-22 NOTE — ASSESSMENT & PLAN NOTE
Mace in place  Failed voiding trial yesterday  Voiding trial again today  Will need urology follow up if not voiding on own at time of discharge

## 2017-03-22 NOTE — ASSESSMENT & PLAN NOTE
Pt received cefepime x9 days (missed one day) and vanc  Now afebrile with normal WBC so will de-escalate to moxifloxacin for 5 day course

## 2017-03-22 NOTE — PLAN OF CARE
Patient approved to transfer to [facility], notified nurse to call report, arranged ambulance through [facility], per family request, will follow.

## 2017-03-23 NOTE — PLAN OF CARE
Problem: Occupational Therapy Goal  Goal: Occupational Therapy Goal  Goals set 3/18 to be addressed for 14 days with expiration date, 4/1:  Patient will increase functional independence with ADLs by performing:    Patient will demonstrate rolling to the right with min assist. Not met   Patient will demonstrate rolling to the left with mod assist. Not met  Patient will demonstrate supine -sit with mod assist. Not met  Patient will demonstrate stand pivot transfers with mod assist. Not met  Patient will demonstrate grooming while standing with mod assist. Not met  Patient will demonstrate upper body dressing with mod assist while seated EOB. Not met  Patient will demonstrate lower body dressing with max assist while seated EOB. Not met  Patient will demonstrate toileting with max assist. Not met  Patient will demonstrate bathing while seated EOB with max assist. Not met  Patient will demonstrate ability to follow 3/4 commands. Not met  atients family / caregiver will demonstrate independence and safety with assisting patient with self-care skills and functional mobility. Not met  Patients family / caregiver will demonstrate independence with providing ROM and changes in bed positioning. Not met  Patient and/or patients family will verbalize understanding of stroke prevention guidelines, personal risk factors and stroke warning signs via teachback method. Not met        Outcome: Outcome(s) achieved Date Met:  03/23/17  No goals met.  Hospital discharge.

## 2017-03-23 NOTE — PT/OT/SLP DISCHARGE
Occupational Therapy Discharge Summary    Zena Aguirre  MRN: [id number]   Embolic stroke involving left middle cerebral artery   Patient Discharged from acute Occupational Therapy on 3/23/17.  Please refer to prior OT note dated on 3/22/17 for functional status.     Assessment:   Patient has not met goals.  GOALS:   Occupational Therapy Goals     Not on file      Multidisciplinary Problems (Resolved)        Problem: Occupational Therapy Goal    Goal Priority Disciplines Outcome Interventions   Occupational Therapy Goal   (Resolved)     OT, PT/OT Outcome(s) achieved    Description:  Goals set 3/18 to be addressed for 14 days with expiration date, 4/1:  Patient will increase functional independence with ADLs by performing:    Patient will demonstrate rolling to the right with min assist.  Not met   Patient will demonstrate rolling to the left with mod assist.   Not met  Patient will demonstrate supine -sit with mod assist.   Not met  Patient will demonstrate stand pivot transfers with mod assist.   Not met  Patient will demonstrate grooming while standing with mod assist.   Not met  Patient will demonstrate upper body dressing with mod assist while seated EOB.   Not met  Patient will demonstrate lower body dressing with max assist while seated EOB.   Not met  Patient will demonstrate toileting with max assist.   Not met  Patient will demonstrate bathing while seated EOB with max assist.   Not met  Patient will demonstrate ability to follow 3/4 commands.   Not met  atient's family / caregiver will demonstrate independence and safety with assisting patient with self-care skills and functional mobility.     Not met  Patient's family / caregiver will demonstrate independence with providing ROM and changes in bed positioning.   Not met  Patient and/or patient's family will verbalize understanding of stroke prevention guidelines, personal risk factors and stroke warning signs via teachback method.  Not met                       Reasons for Discontinuation of Therapy Services  Transfer to alternate level of care.      Plan:  Patient Discharged to: Skilled Nursing Facility.    VERONA Hidalgo 3/23/2017

## 2017-03-23 NOTE — PROGRESS NOTES
Tube feeding disconnected. Clamped. Mace emptied of 2000 cc clear yellow urine. Discharged to ambulance personnel after calling report to [facility].

## 2017-03-24 LAB
BACTERIA BLD CULT: NORMAL
BACTERIA BLD CULT: NORMAL

## 2017-03-28 NOTE — PHYSICIAN QUERY
PT Name: Zena Aguirre  MR #: [id number]     Physician Query Form - Documentation Clarification      CDS/: Shira Walsh               Contact information: ext [phone]    This form is a permanent document in the medical record.     Query Date: March 28, 2017    By submitting this query, we are merely seeking further clarification of documentation. Please utilize your independent clinical judgment when addressing the question(s) below.    The Medical record reflects the following:    Supporting Clinical Findings Location in Medical Record   Sepsis .She had a fever and leukocytosis, no clear source of infection, will pancx and place on broad spectrum abx and re evaluate after the culture results     With patient afebrile, leukocytosis may be from dvts     Progress note dated 3/19/17 by Dr. Byrne            Progress note dated 3/20/17 by Dr. Leslie Damon: Sepsis is only documented once in the progress note dated 3/19/17. There is no documentation of sepsis in the discharge summary. To ensure we are coding accurate data, please clarify if sepsis was ruled in or ruled out at discharge. Thank you    Sepsis ruled in at discharge ---------------------  Sepsis ruled out at discharge ----------------------  Other --------------------  Unable to determine ------xxxxx----   Provider Use Only                                                                                                                               [  ] Clinically undetermined

## 2017-04-12 ENCOUNTER — PATIENT OUTREACH (OUTPATIENT)
Dept: ADMINISTRATIVE | Facility: CLINIC | Age: 82
End: 2017-04-12
Payer: MEDICARE

## 2017-04-21 ENCOUNTER — TELEPHONE (OUTPATIENT)
Dept: NEUROLOGY | Facility: CLINIC | Age: 82
End: 2017-04-21

## 2017-04-21 NOTE — TELEPHONE ENCOUNTER
----- Message from Taylor Archer sent at 4/21/2017 10:31 AM CDT -----  Contact: Alicia daughter [phone]  Alicia is calling to speak with nurse to see if arrangements can be made for her mom to receive care at the nursing home, patient would have to be brought to appt by an ambulance, patient has an appt on 4/26/17. Please call

## 2017-04-21 NOTE — TELEPHONE ENCOUNTER
Returned call to inform patient's daughter, Alicia we do not see patient's in the nursing home.   Patient has a neurologist closer to home (Dr. Hull?) - she would like to follow up in [place] with them as [facility] is >3 hour drive away and she would need to come by ambulance at this time. Alicia asked me to cancel her mother's appointment for next week. She will make a follow up appointment if patient improves to a point where family feels she can make the travel to [place] for follow up.   She is improving neurologically. She has passed a swallow study.

## 2020-01-24 NOTE — ASSESSMENT & PLAN NOTE
Stroke RF.  Likely etiology of patient's AIS.  Plan to start AC this admission.  Hold for now, given tPA and potential size of stroke.   76 yo M h/o HTN, DM + stress test in inf wall defect, here for elective cardiac cath

## 2021-07-28 NOTE — PROGRESS NOTES
ICU Progress Note  Neurocritical Care    Admit Date: 3/7/2017  LOS: 6    CC: Embolic stroke involving left middle cerebral artery    SUBJECTIVE:     Interval History/Significant Events: Repeat CT head shows L MCA/GIRMA with increased midline shift (0.3 mm to 1.1 cm) with subfalcine herniation.  Restart hypertonic saline and give one time mannitol dose.      Review of Symptoms: anni, pt somnolent   Constitutional: Denies fevers or chills.  Pulmonary: Denies shortness of breath or cough.  Cardiology: Denies chest pain or palpitations.  GI: Denies abdominal pain or constipation.  Neurologic: Denies new weakness, headaches, or paresthesias.     Medications:  Continuous Infusions:   sodium chloride 3% 75 mL/hr (03/13/17 1127)     Scheduled Meds:   albuterol-ipratropium 2.5mg-0.5mg/3mL  3 mL Nebulization Q8H    atorvastatin  40 mg Per NG tube Daily    B-complex with vitamin C  1 tablet Per NG tube Daily    calcium carbonate  500 mg Per NG tube BID WM    ciprofloxacin  400 mg Intravenous Q8H    diltiaZEM  30 mg Per NG tube Q8H    levothyroxine  25 mcg Per NG tube Before breakfast    senna-docusate 8.6-50 mg  1 tablet Per NG tube BID    sodium chloride 0.9%  10 mL Intravenous Q6H    sodium chloride 0.9%  3 mL Intravenous Q8H    sodium chloride 3%  250 mL Intravenous Once    vancomycin (VANCOCIN) IVPB  1,250 mg Intravenous Q24H    vitamin D  1,000 Units Per NG tube Daily     PRN Meds:.acetaminophen, hydrALAZINE, flu vacc [id number] 65yr up(PF), labetalol, magnesium sulfate IVPB, magnesium sulfate IVPB, pneumoc 13-evette conj-dip cr(PF), potassium chloride **AND** potassium chloride **AND** potassium chloride, Flushing PICC Protocol **AND** sodium chloride 0.9% **AND** sodium chloride 0.9%, sodium phosphate IVPB, sodium phosphate IVPB, sodium phosphate IVPB    OBJECTIVE:     I & O (24h):  Intake/Output Summary (Last 24 hours) at 03/13/17 1321  Last data filed at 03/13/17 1200   Gross per 24 hour   Intake           4066.25 ml   Output             3345 ml   Net           721.25 ml       Physical Exam:  Last Vitals:  Vitals:    03/13/17 1200   BP: (!) 172/91   Pulse: 93   Resp: (!) 22   Temp:      Vital Signs (24h Range):   Temp:  [97.6 °F (36.4 °C)-99.2 °F (37.3 °C)] 98.3 °F (36.8 °C)  Pulse:  [] 93  Resp:  [18-30] 22  SpO2:  [97 %-100 %] 97 %  BP: (137-180)/(71-98) 172/91  GA: Somnolent, no acute distress.   HEENT: No scleral icterus or JVD.   Pulmonary: Clear to auscultation A/P/L. No wheezing, crackles, or rhonchi.  Cardiac: RRR S1 & S2 w/o rubs/murmurs/gallops.   Abdominal: Bowel sounds present x 4. No appreciable hepatosplenomegaly.  Skin: No jaundice, rashes, or visible lesions.  Neuro:  --GCS: E3 V1 M6  --Mental Status:  Globally aphasic  --CN II-XII grossly intact.   --Pupils 3mm, PERRL.   --Corneal reflex, gag, cough intact.  --moves left extremities spontaneously  --withdraws to pain in right extremities     Invasive Lines:  --Central Line:        PICC Double Lumen 03/08/17 1208 left brachial (Active)   Site Assessment Clean;Dry;Intact;No redness;No swelling 3/13/2017  7:01 AM   Lumen 1 Status Flushed;Blood return noted;Infusing 3/13/2017  7:01 AM   Lumen 2 Status Infusing 3/13/2017  7:01 AM   Length verónica (cm) 36 cm 3/8/2017 12:05 PM   Current Exposed Catheter (cm) 0 cm 3/8/2017 12:05 PM   Extremity Circumference (cm) 25 cm 3/8/2017 12:05 PM   Dressing Type Transparent 3/13/2017  7:01 AM   Dressing Status Biopatch in place;Clean;Dry;Intact 3/13/2017  7:01 AM   Dressing Intervention Dressing reinforced 3/13/2017  7:01 AM   Dressing Change Due 03/15/17 3/13/2017  7:01 AM   Daily Line Review Performed 3/13/2017  7:01 AM       --Arterial Line:      --Surgical Drains/Mace:        NG/OG Tube 03/08/17 0615 Rodney arroyo 14 Fr. Right nostril (Active)   Placement Check placement verified by distal tube length measurement;placement verified by x-ray 3/13/2017  7:01 AM   Distal Tube Length (cm) 63 3/10/2017  3:05 AM  "  Tolerance no signs/symptoms of discomfort 3/13/2017  7:01 AM   Securement anchored to nostril center w/ adhesive device 3/13/2017  7:01 AM   Clamp Status/Tolerance unclamped;no abdominal discomfort;no abdominal distention;no emesis;no nausea;no residual;no restlessness 3/13/2017  7:01 AM   Insertion Site Appearance no redness, warmth, tenderness, skin breakdown, drainage 3/13/2017  7:01 AM   Flush/Irrigation flushed w/;water;no resistance met 3/13/2017  7:01 AM   Feeding Method continuous 3/13/2017  7:01 AM   Current Rate (mL/hr) 40 mL/hr 3/13/2017  7:01 AM   Goal Rate (mL/hr) 40 mL/hr 3/13/2017  7:01 AM   Intake (mL) 150 mL 3/13/2017  8:00 AM   Intake (mL) - Formula Tube Feeding 40 3/13/2017 12:00 PM   Residual Amount (ml) 10 ml 3/12/2017  7:01 AM            Urethral Catheter 03/12/17 1300 (Active)   Site Assessment Clean;Intact 3/13/2017  7:01 AM   Collection Container Urimeter 3/13/2017  7:01 AM   Securement Method secured to top of thigh w/ adhesive device 3/13/2017  7:01 AM   Catheter Care Performed yes 3/13/2017  7:01 AM   Reason for Continuing Urinary Catheterization Critically ill in ICU requiring intensive monitoring 3/13/2017  7:01 AM   CAUTI Prevention Bundle StatLock in place w 1" slack;Intact seal between catheter & drainage tubing;Drainage bag off the floor;Green sheeting clip in use;No dependent loops or kinks;Drainage bag not overfilled (<2/3 full);Drainage bag below bladder 3/13/2017  7:01 AM   Output (mL) 450 mL 3/13/2017 12:00 PM       Nutrition: TF     Labs:  ABG:   Recent Labs  Lab 03/13/17  0413   PH 7.461*   PO2 90   PCO2 30.1*   HCO3 21.4*   POCSATURATED 98   BE -2       BMP:  Recent Labs  Lab 03/13/17  0133 03/13/17  0742 03/13/17  1211     136 132* 132*   K 3.8 4.6  --    *  --   --    CO2 18*  --   --    BUN 18  --   --    CREATININE 0.6  --   --    *  --   --    MG 1.5* 2.0  --    PHOS 1.7*  --  3.0       LFT: Lab Results   Component Value Date    AST 30 03/13/2017 "    ALT 18 03/13/2017    ALKPHOS 43 (L) 03/13/2017    BILITOT 0.4 03/13/2017    ALBUMIN 2.0 (L) 03/13/2017    PROT 5.4 (L) 03/13/2017       CBC:   Lab Results   Component Value Date    WBC 12.74 (H) 03/13/2017    HGB 9.8 (L) 03/13/2017    HCT 30.4 (L) 03/13/2017     (H) 03/13/2017     03/13/2017       Microbiology x 7d:   Microbiology Results (last 7 days)     Procedure Component Value Units Date/Time    Culture, Respiratory [[id number]]     Order Status:  No result Specimen:  Respiratory           Imaging:  I have personally reviewed.     ASSESSMENT/PLAN:     Patient Active Problem List   Diagnosis    Hypothyroidism    Essential hypertension    Paroxysmal atrial fibrillation    Hyperlipidemia    Embolic stroke involving left middle cerebral artery    Cytotoxic cerebral edema    Right flaccid hemiplegia    Pleural effusion, bilateral    Pulmonary hypertension    Brain compression       Neuro:  L MCA/GIRMA infarct  --repeat CT head with increased midline shift, subfalcine herniation, concern for petechial hemorrhage  --hold ASA and SQH at this time   --mannitol 50 g x 1  --resume 3% NaCl @ 75 cc/hour  --q 6 hour Na checks, goal 145-55  --repeat scan tomorrow morning   --continue atorvastatin     Pulmonary:  --NC prn    Cardiac:  Atrial fibrillation  --resume dilt po 30 mg tid    HTN  ---160 in setting of hemorrhagic conversion     Renal:   --BUN, creat stable  --24 hour net I/O: +3.2 L    ID:  --cipro and vanc for suspected pna  --continue abx until respiratory cultures results   --afebrile     Hem/Onc:  --H/H decreased but stable  --platelets 151     Endocrine:  --A1C 5.9  --TSH 0.109, free T4 1.23  --continue levothyroxine 25 mcg daily     Fluids/Electrolytes/Nutrition/GI:  --3% NaCl @ 75 cc/hour  --check Na q 6 hours  --TF    Critical Care/Counseling Time: > 35 minutes     Lissa Renner PA-C  Neuro Critical Care  [id number]            Statement Selected

## 2022-03-09 NOTE — PROGRESS NOTES
ICU Progress Note  Neurocritical Care    Admit Date: 3/7/2017  LOS: 9    CC: Embolic stroke involving left middle cerebral artery    SUBJECTIVE:     Interval History/Significant Events: PEG placement today.  Begin NS while pt NPO.  Continue to check serum sodium q 6 hours. Pt pulled NGT overnight.  Will give IV meds rather than replace tube prior to PEG placement this afternoon.     Review of Symptoms: anni, pt aphasic   Constitutional: Denies fevers or chills.  Pulmonary: Denies shortness of breath or cough.  Cardiology: Denies chest pain or palpitations.  GI: Denies abdominal pain or constipation.  Neurologic: Denies new weakness, headaches, or paresthesias.     Medications:  Continuous Infusions:   sodium chloride 0.9% 50 mL/hr at 03/16/17 1300     Scheduled Meds:   albuterol-ipratropium 2.5mg-0.5mg/3mL  3 mL Nebulization Q8H    aspirin  81 mg Per NG tube Daily    atorvastatin  40 mg Per NG tube Daily    B-complex with vitamin C  1 tablet Per NG tube Daily    calcium carbonate  500 mg Per NG tube BID WM    ceFEPime (MAXIPIME) IVPB  1 g Intravenous Q8H    diltiaZEM  60 mg Per NG tube Q6H    heparin (porcine)  5,000 Units Subcutaneous Q8H    levothyroxine  25 mcg Per NG tube Before breakfast    lisinopril  20 mg Per NG tube Daily    senna-docusate 8.6-50 mg  1 tablet Per NG tube BID    sodium chloride 0.9%  10 mL Intravenous Q6H    sodium chloride 0.9%  3 mL Intravenous Q8H    sodium chloride 3%  250 mL Intravenous Once    vancomycin (VANCOCIN) IVPB  1,250 mg Intravenous Q12H    vitamin D  1,000 Units Per NG tube Daily     PRN Meds:.acetaminophen, dextrose 50%, fentaNYL, glucagon (human recombinant), hydrALAZINE, flu vacc [id number] 65yr up(PF), insulin aspart, magnesium sulfate IVPB, magnesium sulfate IVPB, midazolam (PF), pneumoc 13-evette conj-dip cr(PF), potassium chloride **AND** potassium chloride **AND** potassium chloride, rocuronium, Flushing PICC Protocol **AND** sodium chloride 0.9% **AND**  sodium chloride 0.9%, sodium phosphate IVPB, sodium phosphate IVPB, sodium phosphate IVPB    OBJECTIVE:     I & O (24h):    Intake/Output Summary (Last 24 hours) at 03/16/17 1351  Last data filed at 03/16/17 1300   Gross per 24 hour   Intake             1500 ml   Output             1580 ml   Net              -80 ml       Physical Exam:  Last Vitals:  Vitals:    03/16/17 1300   BP: 125/76   Pulse: (!) 129   Resp: 18   Temp:      Vital Signs (24h Range):   Temp:  [97.5 °F (36.4 °C)-98.6 °F (37 °C)] 97.7 °F (36.5 °C)  Pulse:  [] 129  Resp:  [11-37] 18  SpO2:  [95 %-100 %] 100 %  BP: (113-184)/() 125/76  GA: More alert on PE, no acute distress.   HEENT: No scleral icterus or JVD.   Pulmonary: Clear to auscultation A/P/L. No wheezing, crackles, or rhonchi.  Cardiac: RRR S1 & S2 w/o rubs/murmurs/gallops.   Abdominal: Bowel sounds present x 4. No appreciable hepatosplenomegaly.  Skin: No jaundice, rashes, or visible lesions.  Neuro:  --GCS: E4 V1 M6  --Mental Status:  Expressive aphasia.  Follows simple commands intermittently.   --CN II-XII grossly intact.   --Pupils 3mm, PERRL.   --Corneal reflex, gag, cough intact.  --moves left extremities spontaneously  --withdraws to pain in right extremities     Invasive Lines:  --Central Line:        PICC Double Lumen 03/08/17 1208 left brachial (Active)   Site Assessment Clean;Dry;Intact;No redness;No swelling 3/13/2017  7:01 AM   Lumen 1 Status Flushed;Blood return noted;Infusing 3/13/2017  7:01 AM   Lumen 2 Status Infusing 3/13/2017  7:01 AM   Length verónica (cm) 36 cm 3/8/2017 12:05 PM   Current Exposed Catheter (cm) 0 cm 3/8/2017 12:05 PM   Extremity Circumference (cm) 25 cm 3/8/2017 12:05 PM   Dressing Type Transparent 3/13/2017  7:01 AM   Dressing Status Biopatch in place;Clean;Dry;Intact 3/13/2017  7:01 AM   Dressing Intervention Dressing reinforced 3/13/2017  7:01 AM   Dressing Change Due 03/15/17 3/13/2017  7:01 AM   Daily Line Review Performed 3/13/2017  7:01 AM  "      --Arterial Line:      --Surgical Drains/Mace:        NG/OG Tube 03/08/17 0615 Rodney sump 14 Fr. Right nostril (Active)   Placement Check placement verified by distal tube length measurement;placement verified by x-ray 3/13/2017  7:01 AM   Distal Tube Length (cm) 63 3/10/2017  3:05 AM   Tolerance no signs/symptoms of discomfort 3/13/2017  7:01 AM   Securement anchored to nostril center w/ adhesive device 3/13/2017  7:01 AM   Clamp Status/Tolerance unclamped;no abdominal discomfort;no abdominal distention;no emesis;no nausea;no residual;no restlessness 3/13/2017  7:01 AM   Insertion Site Appearance no redness, warmth, tenderness, skin breakdown, drainage 3/13/2017  7:01 AM   Flush/Irrigation flushed w/;water;no resistance met 3/13/2017  7:01 AM   Feeding Method continuous 3/13/2017  7:01 AM   Current Rate (mL/hr) 40 mL/hr 3/13/2017  7:01 AM   Goal Rate (mL/hr) 40 mL/hr 3/13/2017  7:01 AM   Intake (mL) 150 mL 3/13/2017  8:00 AM   Intake (mL) - Formula Tube Feeding 40 3/13/2017 12:00 PM   Residual Amount (ml) 10 ml 3/12/2017  7:01 AM            Urethral Catheter 03/12/17 1300 (Active)   Site Assessment Clean;Intact 3/13/2017  7:01 AM   Collection Container Urimeter 3/13/2017  7:01 AM   Securement Method secured to top of thigh w/ adhesive device 3/13/2017  7:01 AM   Catheter Care Performed yes 3/13/2017  7:01 AM   Reason for Continuing Urinary Catheterization Critically ill in ICU requiring intensive monitoring 3/13/2017  7:01 AM   CAUTI Prevention Bundle StatLock in place w 1" slack;Intact seal between catheter & drainage tubing;Drainage bag off the floor;Green sheeting clip in use;No dependent loops or kinks;Drainage bag not overfilled (<2/3 full);Drainage bag below bladder 3/13/2017  7:01 AM   Output (mL) 450 mL 3/13/2017 12:00 PM       Nutrition: TF held for PEG placement     Labs:  ABG:   No results for input(s): PH, PO2, PCO2, HCO3, POCSATURATED, BE in the last 24 hours.    BMP:    Recent Labs  Lab " 03/16/17  0210  03/16/17  1125   *  < > 148*   K 3.8  --   --    *  --   --    CO2 19*  --   --    BUN 31*  --   --    CREATININE 0.7  --   --    *  --   --    MG 1.9  --   --    PHOS 2.8  --   --    < > = values in this interval not displayed.    LFT:   Lab Results   Component Value Date    AST 41 (H) 03/16/2017    ALT 34 03/16/2017    ALKPHOS 51 (L) 03/16/2017    BILITOT 0.4 03/16/2017    ALBUMIN 2.1 (L) 03/16/2017    PROT 5.8 (L) 03/16/2017       CBC:   Lab Results   Component Value Date    WBC 14.07 (H) 03/16/2017    HGB 9.2 (L) 03/16/2017    HCT 29.1 (L) 03/16/2017     (H) 03/16/2017     03/16/2017       Microbiology x 7d:   Microbiology Results (last 7 days)     Procedure Component Value Units Date/Time    Culture, Respiratory [[id number]] Collected:  03/13/17 1608    Order Status:  Completed Specimen:  Respiratory from Sputum Updated:  03/16/17 0950     Respiratory Culture Normal respiratory richard     Gram Stain (Respiratory) <10 epithelial cells per low power field.     Gram Stain (Respiratory) Rare WBC's     Gram Stain (Respiratory) Few Gram positive cocci     Gram Stain (Respiratory) Rare budding yeast     Gram Stain (Respiratory) Rare Gram negative rods          Imaging:  I have personally reviewed.     ASSESSMENT/PLAN:     Patient Active Problem List   Diagnosis    Hypothyroidism    Essential hypertension    Paroxysmal atrial fibrillation    Hyperlipidemia    Embolic stroke involving left middle cerebral artery    Cytotoxic cerebral edema    Right flaccid hemiplegia    Pleural effusion, bilateral    Pulmonary hypertension    Brain compression    Oral phase dysphagia       Neuro:  L MCA/GIRMA infarct  --repeat CT head with slight decrease in midline shift from prior study   --continue asa   --HTS discontinued 3/15, continue q 6 hour Na checks, goal 140-150  --continue atorvastatin     Pulmonary:  --NC prn    Cardiac:  Atrial fibrillation  --increase dilt to 60 mg  qid  --metoprolol IV today     HTN  ---160 in setting of hemorrhagic conversion   --lisinopril 20 mg daily     Renal:   --BUN, creat stable  --24 hour net I/O: +500 cc    ID:  --cefepime and vanc for pna  --continue for 1 more day, end date 3/17/17  --afebrile   --WBC 14    Hem/Onc:  --H/H decreased but stable  --platelets 204    Endocrine:  --A1C 5.9  --TSH 0.109, free T4 1.23  --continue levothyroxine 25 mcg daily     Fluids/Electrolytes/Nutrition/GI:  --d/c HTS 3/15  --NS @ 50 cc/horu while NPO  --continue to check Na q 6 hours  --TF held for PEG     Level III    Lissa Renner PA-C  Neuro Critical Care  [id number]            61
